# Patient Record
Sex: MALE | Race: WHITE | NOT HISPANIC OR LATINO | Employment: FULL TIME | URBAN - METROPOLITAN AREA
[De-identification: names, ages, dates, MRNs, and addresses within clinical notes are randomized per-mention and may not be internally consistent; named-entity substitution may affect disease eponyms.]

---

## 2017-01-03 ENCOUNTER — HOSPITAL ENCOUNTER (OUTPATIENT)
Dept: WOUND CARE | Facility: HOSPITAL | Age: 42
Discharge: HOME | End: 2017-01-03
Attending: PODIATRIST | Admitting: PODIATRIST

## 2017-01-10 ENCOUNTER — HOSPITAL ENCOUNTER (OUTPATIENT)
Dept: WOUND CARE | Facility: HOSPITAL | Age: 42
Discharge: HOME | End: 2017-01-10
Attending: PODIATRIST | Admitting: PODIATRIST

## 2017-01-17 ENCOUNTER — HOSPITAL ENCOUNTER (OUTPATIENT)
Dept: WOUND CARE | Facility: HOSPITAL | Age: 42
Discharge: HOME | End: 2017-01-17
Attending: PODIATRIST | Admitting: PODIATRIST

## 2017-01-24 ENCOUNTER — HOSPITAL ENCOUNTER (OUTPATIENT)
Dept: WOUND CARE | Facility: HOSPITAL | Age: 42
Discharge: HOME | End: 2017-01-24
Attending: PODIATRIST | Admitting: PODIATRIST

## 2017-02-06 ENCOUNTER — HOSPITAL ENCOUNTER (OUTPATIENT)
Dept: LAB | Facility: HOSPITAL | Age: 42
Setting detail: SPECIMEN
Discharge: HOME | End: 2017-02-06
Attending: NURSE PRACTITIONER | Admitting: NURSE PRACTITIONER

## 2017-02-06 LAB
ALBUMIN SERPL-MCNC: 3.6 G/DL (ref 3.5–4.8)
ALBUMIN/GLOB SERPL: 1.2 {RATIO} (ref 1–1.7)
ALP SERPL-CCNC: 57 IU/L (ref 32–91)
ALT SERPL-CCNC: 18 IU/L (ref 17–63)
ANION GAP SERPL CALC-SCNC: 11.3 MMOL/L (ref 10–20)
AST SERPL-CCNC: 17 IU/L (ref 15–41)
BILIRUB SERPL-MCNC: 0.5 MG/DL (ref 0.3–1.2)
BUN SERPL-MCNC: 11 MG/DL (ref 8–20)
BUN/CREAT SERPL: 18.3 (ref 6.2–20.3)
CALCIUM SERPL-MCNC: 9.5 MG/DL (ref 8.9–10.3)
CHLORIDE SERPL-SCNC: 104 MMOL/L (ref 101–111)
CHOLEST SERPL-MCNC: 146 MG/DL
CHOLEST/HDLC SERPL: 3.8 {RATIO}
CONV CO2: 25 MMOL/L (ref 22–32)
CONV LDL CHOLESTEROL DIRECT: 93 MG/DL (ref 0–100)
CONV MICROALBUM.,U,RANDOM: 39 MG/L
CONV TOTAL PROTEIN: 6.7 G/DL (ref 6.1–7.9)
CREAT 24H UR-MCNC: 78.1 MG/DL
CREAT UR-MCNC: 0.6 MG/DL (ref 0.7–1.2)
GLOBULIN UR ELPH-MCNC: 3.1 G/DL (ref 2.5–3.8)
GLUCOSE SERPL-MCNC: 256 MG/DL (ref 65–99)
HDLC SERPL-MCNC: 38 MG/DL
LDLC/HDLC SERPL: 2.4 {RATIO}
LIPID INTERPRETATION: ABNORMAL
MICROALBUMIN/CREAT UR: 49.9 UG/MG
POTASSIUM SERPL-SCNC: 4.3 MMOL/L (ref 3.6–5.1)
SODIUM SERPL-SCNC: 136 MMOL/L (ref 136–144)
TRIGL SERPL-MCNC: 93 MG/DL
VLDLC SERPL CALC-MCNC: 15.2 MG/DL

## 2017-02-07 ENCOUNTER — HOSPITAL ENCOUNTER (OUTPATIENT)
Dept: WOUND CARE | Facility: HOSPITAL | Age: 42
Discharge: HOME | End: 2017-02-07
Attending: PODIATRIST | Admitting: PODIATRIST

## 2017-02-14 ENCOUNTER — HOSPITAL ENCOUNTER (OUTPATIENT)
Dept: WOUND CARE | Facility: HOSPITAL | Age: 42
Discharge: HOME | End: 2017-02-14
Attending: PODIATRIST | Admitting: PODIATRIST

## 2017-02-21 ENCOUNTER — HOSPITAL ENCOUNTER (OUTPATIENT)
Dept: WOUND CARE | Facility: HOSPITAL | Age: 42
Discharge: HOME | End: 2017-02-21
Attending: PODIATRIST | Admitting: PODIATRIST

## 2017-02-28 ENCOUNTER — HOSPITAL ENCOUNTER (OUTPATIENT)
Dept: WOUND CARE | Facility: HOSPITAL | Age: 42
Discharge: HOME | End: 2017-02-28
Attending: PODIATRIST | Admitting: PODIATRIST

## 2017-03-14 ENCOUNTER — HOSPITAL ENCOUNTER (OUTPATIENT)
Dept: WOUND CARE | Facility: HOSPITAL | Age: 42
Discharge: HOME | End: 2017-03-14
Attending: PODIATRIST | Admitting: PODIATRIST

## 2017-03-21 ENCOUNTER — HOSPITAL ENCOUNTER (OUTPATIENT)
Dept: WOUND CARE | Facility: HOSPITAL | Age: 42
Discharge: HOME | End: 2017-03-21
Attending: PODIATRIST | Admitting: PODIATRIST

## 2017-03-24 ENCOUNTER — HOSPITAL ENCOUNTER (OUTPATIENT)
Dept: PREADMISSION TESTING | Facility: HOSPITAL | Age: 42
Discharge: HOME | End: 2017-03-24
Attending: PODIATRIST | Admitting: PODIATRIST

## 2017-03-24 ENCOUNTER — HOSPITAL ENCOUNTER (OUTPATIENT)
Dept: LAB | Facility: HOSPITAL | Age: 42
Setting detail: SPECIMEN
Discharge: HOME | End: 2017-03-24
Attending: INTERNAL MEDICINE | Admitting: INTERNAL MEDICINE

## 2017-03-24 LAB
ALBUMIN SERPL-MCNC: 3.4 G/DL (ref 3.5–4.8)
ALBUMIN/GLOB SERPL: 0.9 {RATIO} (ref 1–1.7)
ALP SERPL-CCNC: 57 IU/L (ref 32–91)
ALT SERPL-CCNC: 28 IU/L (ref 17–63)
ANION GAP SERPL CALC-SCNC: 15.1 MMOL/L (ref 10–20)
ANION GAP SERPL CALC-SCNC: 16.2 MMOL/L (ref 10–20)
AST SERPL-CCNC: 19 IU/L (ref 15–41)
BACTERIA SPEC AEROBE CULT: NORMAL
BASOPHILS # BLD AUTO: 0.1 10*3/UL (ref 0–0.2)
BASOPHILS NFR BLD AUTO: 1 % (ref 0–2)
BILIRUB SERPL-MCNC: 0.7 MG/DL (ref 0.3–1.2)
BUN SERPL-MCNC: 13 MG/DL (ref 8–20)
BUN SERPL-MCNC: 15 MG/DL (ref 8–20)
BUN/CREAT SERPL: 18.6 (ref 6.2–20.3)
BUN/CREAT SERPL: 18.8 (ref 6.2–20.3)
CALCIUM SERPL-MCNC: 9.5 MG/DL (ref 8.9–10.3)
CALCIUM SERPL-MCNC: 9.9 MG/DL (ref 8.9–10.3)
CHLORIDE SERPL-SCNC: 101 MMOL/L (ref 101–111)
CHLORIDE SERPL-SCNC: 104 MMOL/L (ref 101–111)
CONV CO2: 24 MMOL/L (ref 22–32)
CONV CO2: 24 MMOL/L (ref 22–32)
CONV TOTAL PROTEIN: 7.4 G/DL (ref 6.1–7.9)
CREAT UR-MCNC: 0.7 MG/DL (ref 0.7–1.2)
CREAT UR-MCNC: 0.8 MG/DL (ref 0.7–1.2)
DIFFERENTIAL METHOD BLD: (no result)
EOSINOPHIL # BLD AUTO: 0.4 10*3/UL (ref 0–0.3)
EOSINOPHIL # BLD AUTO: 4 % (ref 0–3)
ERYTHROCYTE [DISTWIDTH] IN BLOOD BY AUTOMATED COUNT: 13.4 % (ref 11.5–14.5)
GLOBULIN UR ELPH-MCNC: 4 G/DL (ref 2.5–3.8)
GLUCOSE SERPL-MCNC: 166 MG/DL (ref 65–99)
GLUCOSE SERPL-MCNC: 168 MG/DL (ref 65–99)
HCT VFR BLD AUTO: 41.4 % (ref 40–54)
HGB BLD-MCNC: 14.2 G/DL (ref 14–18)
LYMPHOCYTES # BLD AUTO: 2.3 10*3/UL (ref 0.8–4.8)
LYMPHOCYTES NFR BLD AUTO: 24 % (ref 18–42)
Lab: NORMAL
MCH RBC QN AUTO: 30.8 PG (ref 26–32)
MCHC RBC AUTO-ENTMCNC: 34.3 G/DL (ref 32–36)
MCV RBC AUTO: 89.9 FL (ref 80–94)
MICRO REPORT STATUS: NORMAL
MONOCYTES # BLD AUTO: 0.5 10*3/UL (ref 0.1–1.3)
MONOCYTES NFR BLD AUTO: 5 % (ref 2–11)
NEUTROPHILS # BLD AUTO: 6.1 10*3/UL (ref 2.3–8.6)
NEUTROPHILS NFR BLD AUTO: 66 % (ref 50–75)
NRBC BLD AUTO-RTO: 0 /100{WBCS}
NRBC/RBC NFR BLD MANUAL: 0 10*3/UL
PLATELET # BLD AUTO: 249 10*3/UL (ref 150–450)
PMV BLD AUTO: 7.1 FL (ref 7.4–10.4)
POTASSIUM SERPL-SCNC: 4.1 MMOL/L (ref 3.6–5.1)
POTASSIUM SERPL-SCNC: 4.2 MMOL/L (ref 3.6–5.1)
RBC # BLD AUTO: 4.6 10*6/UL (ref 4.6–6)
SODIUM SERPL-SCNC: 136 MMOL/L (ref 136–144)
SODIUM SERPL-SCNC: 140 MMOL/L (ref 136–144)
SPECIMEN SOURCE: NORMAL
WBC # BLD AUTO: 9.3 10*3/UL (ref 4.5–11.5)

## 2017-03-28 ENCOUNTER — HOSPITAL ENCOUNTER (OUTPATIENT)
Dept: WOUND CARE | Facility: HOSPITAL | Age: 42
Discharge: HOME | End: 2017-03-28
Attending: PODIATRIST | Admitting: PODIATRIST

## 2017-03-31 ENCOUNTER — HOSPITAL ENCOUNTER (OUTPATIENT)
Dept: PREOP | Facility: HOSPITAL | Age: 42
Setting detail: HOSPITAL OUTPATIENT SURGERY
Discharge: HOME | End: 2017-03-31
Attending: PODIATRIST | Admitting: PODIATRIST

## 2017-03-31 LAB
BACTERIA SPEC AEROBE CULT: NORMAL
GLUCOSE BLD-MCNC: 190 MG/DL (ref 70–105)
GRAM STN SPEC: NORMAL
Lab: NORMAL
MICRO REPORT STATUS: NORMAL
SPECIMEN SOURCE: NORMAL

## 2017-04-11 ENCOUNTER — HOSPITAL ENCOUNTER (OUTPATIENT)
Dept: WOUND CARE | Facility: HOSPITAL | Age: 42
Discharge: HOME | End: 2017-04-11
Attending: PODIATRIST | Admitting: PODIATRIST

## 2017-04-18 ENCOUNTER — HOSPITAL ENCOUNTER (OUTPATIENT)
Dept: WOUND CARE | Facility: HOSPITAL | Age: 42
Discharge: HOME | End: 2017-04-18
Attending: PODIATRIST | Admitting: PODIATRIST

## 2017-04-28 ENCOUNTER — HOSPITAL ENCOUNTER (OUTPATIENT)
Dept: LAB | Facility: HOSPITAL | Age: 42
Discharge: HOME | End: 2017-04-28
Attending: PODIATRIST | Admitting: PODIATRIST

## 2017-04-28 LAB
ANION GAP SERPL CALC-SCNC: 14.1 MMOL/L (ref 10–20)
BACTERIA SPEC AEROBE CULT: NORMAL
BASOPHILS # BLD AUTO: 0.1 10*3/UL (ref 0–0.2)
BASOPHILS NFR BLD AUTO: 1 % (ref 0–2)
BUN SERPL-MCNC: 16 MG/DL (ref 8–20)
BUN/CREAT SERPL: 22.9 (ref 6.2–20.3)
CALCIUM SERPL-MCNC: 9.5 MG/DL (ref 8.9–10.3)
CHLORIDE SERPL-SCNC: 104 MMOL/L (ref 101–111)
CONV CO2: 24 MMOL/L (ref 22–32)
CREAT UR-MCNC: 0.7 MG/DL (ref 0.7–1.2)
DIFFERENTIAL METHOD BLD: (no result)
EOSINOPHIL # BLD AUTO: 0.4 10*3/UL (ref 0–0.3)
EOSINOPHIL # BLD AUTO: 4 % (ref 0–3)
ERYTHROCYTE [DISTWIDTH] IN BLOOD BY AUTOMATED COUNT: 12.9 % (ref 11.5–14.5)
GLUCOSE SERPL-MCNC: 170 MG/DL (ref 65–99)
HCT VFR BLD AUTO: 42.3 % (ref 40–54)
HGB BLD-MCNC: 14.6 G/DL (ref 14–18)
LYMPHOCYTES # BLD AUTO: 2.1 10*3/UL (ref 0.8–4.8)
LYMPHOCYTES NFR BLD AUTO: 22 % (ref 18–42)
Lab: NORMAL
MCH RBC QN AUTO: 31.2 PG (ref 26–32)
MCHC RBC AUTO-ENTMCNC: 34.5 G/DL (ref 32–36)
MCV RBC AUTO: 90.5 FL (ref 80–94)
MICRO REPORT STATUS: NORMAL
MONOCYTES # BLD AUTO: 0.5 10*3/UL (ref 0.1–1.3)
MONOCYTES NFR BLD AUTO: 6 % (ref 2–11)
NEUTROPHILS # BLD AUTO: 6.3 10*3/UL (ref 2.3–8.6)
NEUTROPHILS NFR BLD AUTO: 67 % (ref 50–75)
NRBC BLD AUTO-RTO: 0 /100{WBCS}
NRBC/RBC NFR BLD MANUAL: 0 10*3/UL
PLATELET # BLD AUTO: 222 10*3/UL (ref 150–450)
PMV BLD AUTO: 7.2 FL (ref 7.4–10.4)
POTASSIUM SERPL-SCNC: 4.1 MMOL/L (ref 3.6–5.1)
RBC # BLD AUTO: 4.68 10*6/UL (ref 4.6–6)
SODIUM SERPL-SCNC: 138 MMOL/L (ref 136–144)
SPECIMEN SOURCE: NORMAL
WBC # BLD AUTO: 9.3 10*3/UL (ref 4.5–11.5)

## 2017-05-02 ENCOUNTER — HOSPITAL ENCOUNTER (OUTPATIENT)
Dept: WOUND CARE | Facility: HOSPITAL | Age: 42
Discharge: HOME | End: 2017-05-02
Attending: PODIATRIST | Admitting: PODIATRIST

## 2017-05-05 ENCOUNTER — HOSPITAL ENCOUNTER (OUTPATIENT)
Dept: PREOP | Facility: HOSPITAL | Age: 42
Setting detail: HOSPITAL OUTPATIENT SURGERY
Discharge: HOME | End: 2017-05-05
Attending: PODIATRIST | Admitting: PODIATRIST

## 2017-05-05 LAB — GLUCOSE BLD-MCNC: 190 MG/DL (ref 70–105)

## 2017-05-16 ENCOUNTER — HOSPITAL ENCOUNTER (OUTPATIENT)
Dept: WOUND CARE | Facility: HOSPITAL | Age: 42
Discharge: HOME | End: 2017-05-16
Attending: PODIATRIST | Admitting: PODIATRIST

## 2017-05-23 ENCOUNTER — HOSPITAL ENCOUNTER (OUTPATIENT)
Dept: WOUND CARE | Facility: HOSPITAL | Age: 42
Discharge: HOME | End: 2017-05-23
Attending: PODIATRIST | Admitting: PODIATRIST

## 2017-05-30 ENCOUNTER — HOSPITAL ENCOUNTER (OUTPATIENT)
Dept: WOUND CARE | Facility: HOSPITAL | Age: 42
Discharge: HOME | End: 2017-05-30
Attending: PODIATRIST | Admitting: PODIATRIST

## 2017-06-13 ENCOUNTER — HOSPITAL ENCOUNTER (OUTPATIENT)
Dept: WOUND CARE | Facility: HOSPITAL | Age: 42
Discharge: HOME | End: 2017-06-13
Attending: PODIATRIST | Admitting: PODIATRIST

## 2017-06-20 ENCOUNTER — HOSPITAL ENCOUNTER (OUTPATIENT)
Dept: WOUND CARE | Facility: HOSPITAL | Age: 42
Discharge: HOME | End: 2017-06-20
Attending: PODIATRIST | Admitting: PODIATRIST

## 2017-06-27 ENCOUNTER — HOSPITAL ENCOUNTER (OUTPATIENT)
Dept: WOUND CARE | Facility: HOSPITAL | Age: 42
Discharge: HOME | End: 2017-06-27
Attending: PODIATRIST | Admitting: PODIATRIST

## 2017-07-11 ENCOUNTER — HOSPITAL ENCOUNTER (OUTPATIENT)
Dept: WOUND CARE | Facility: HOSPITAL | Age: 42
Discharge: HOME | End: 2017-07-11
Attending: PODIATRIST | Admitting: PODIATRIST

## 2017-07-13 ENCOUNTER — HOSPITAL ENCOUNTER (OUTPATIENT)
Dept: LAB | Facility: HOSPITAL | Age: 42
Setting detail: SPECIMEN
Discharge: HOME | End: 2017-07-13
Attending: NURSE PRACTITIONER | Admitting: NURSE PRACTITIONER

## 2017-07-13 LAB
ALBUMIN SERPL-MCNC: 3.8 G/DL (ref 3.5–4.8)
ALBUMIN/GLOB SERPL: 1.1 {RATIO} (ref 1–1.7)
ALP SERPL-CCNC: 52 IU/L (ref 32–91)
ALT SERPL-CCNC: 25 IU/L (ref 17–63)
ANION GAP SERPL CALC-SCNC: 13.3 MMOL/L (ref 10–20)
AST SERPL-CCNC: 18 IU/L (ref 15–41)
BILIRUB SERPL-MCNC: 0.5 MG/DL (ref 0.3–1.2)
BUN SERPL-MCNC: 12 MG/DL (ref 8–20)
BUN/CREAT SERPL: 20 (ref 6.2–20.3)
CALCIUM SERPL-MCNC: 9.8 MG/DL (ref 8.9–10.3)
CHLORIDE SERPL-SCNC: 103 MMOL/L (ref 101–111)
CHOLEST SERPL-MCNC: 203 MG/DL
CHOLEST/HDLC SERPL: 4.9 {RATIO}
CONV CO2: 26 MMOL/L (ref 22–32)
CONV LDL CHOLESTEROL DIRECT: 145 MG/DL (ref 0–100)
CONV MICROALBUM.,U,RANDOM: 9 MG/L
CONV TOTAL PROTEIN: 7.2 G/DL (ref 6.1–7.9)
CREAT 24H UR-MCNC: 79.1 MG/DL
CREAT UR-MCNC: 0.6 MG/DL (ref 0.7–1.2)
GLOBULIN UR ELPH-MCNC: 3.4 G/DL (ref 2.5–3.8)
GLUCOSE SERPL-MCNC: 195 MG/DL (ref 65–99)
HDLC SERPL-MCNC: 42 MG/DL
LDLC/HDLC SERPL: 3.5 {RATIO}
LIPID INTERPRETATION: ABNORMAL
MICROALBUMIN/CREAT UR: 11.4 UG/MG
POTASSIUM SERPL-SCNC: 4.3 MMOL/L (ref 3.6–5.1)
SODIUM SERPL-SCNC: 138 MMOL/L (ref 136–144)
TRIGL SERPL-MCNC: 144 MG/DL
VLDLC SERPL CALC-MCNC: 16.5 MG/DL

## 2017-07-18 ENCOUNTER — HOSPITAL ENCOUNTER (OUTPATIENT)
Dept: WOUND CARE | Facility: HOSPITAL | Age: 42
Discharge: HOME | End: 2017-07-18
Attending: PODIATRIST | Admitting: PODIATRIST

## 2017-07-25 ENCOUNTER — HOSPITAL ENCOUNTER (OUTPATIENT)
Dept: WOUND CARE | Facility: HOSPITAL | Age: 42
Discharge: HOME | End: 2017-07-25
Attending: PODIATRIST | Admitting: PODIATRIST

## 2017-08-01 ENCOUNTER — HOSPITAL ENCOUNTER (OUTPATIENT)
Dept: WOUND CARE | Facility: HOSPITAL | Age: 42
Discharge: HOME | End: 2017-08-01
Attending: PODIATRIST | Admitting: PODIATRIST

## 2017-08-08 ENCOUNTER — HOSPITAL ENCOUNTER (OUTPATIENT)
Dept: WOUND CARE | Facility: HOSPITAL | Age: 42
Discharge: HOME | End: 2017-08-08
Attending: PODIATRIST | Admitting: PODIATRIST

## 2017-08-29 ENCOUNTER — HOSPITAL ENCOUNTER (OUTPATIENT)
Dept: WOUND CARE | Facility: HOSPITAL | Age: 42
Discharge: HOME | End: 2017-08-29
Attending: PODIATRIST | Admitting: PODIATRIST

## 2017-10-09 ENCOUNTER — HOSPITAL ENCOUNTER (OUTPATIENT)
Dept: LAB | Facility: HOSPITAL | Age: 42
Setting detail: SPECIMEN
Discharge: HOME | End: 2017-10-09
Attending: INTERNAL MEDICINE | Admitting: INTERNAL MEDICINE

## 2017-10-09 LAB
ALBUMIN SERPL-MCNC: 3.7 G/DL (ref 3.5–4.8)
ALBUMIN/GLOB SERPL: 1.2 {RATIO} (ref 1–1.7)
ALP SERPL-CCNC: 51 IU/L (ref 32–91)
ALT SERPL-CCNC: 25 IU/L (ref 17–63)
ANION GAP SERPL CALC-SCNC: 12.5 MMOL/L (ref 10–20)
AST SERPL-CCNC: 22 IU/L (ref 15–41)
BILIRUB SERPL-MCNC: 0.5 MG/DL (ref 0.3–1.2)
BUN SERPL-MCNC: 19 MG/DL (ref 8–20)
BUN/CREAT SERPL: 23.8 (ref 6.2–20.3)
CALCIUM SERPL-MCNC: 9.5 MG/DL (ref 8.9–10.3)
CHLORIDE SERPL-SCNC: 102 MMOL/L (ref 101–111)
CHOLEST SERPL-MCNC: 152 MG/DL
CHOLEST/HDLC SERPL: 4.2 {RATIO}
CONV CO2: 26 MMOL/L (ref 22–32)
CONV LDL CHOLESTEROL DIRECT: 99 MG/DL (ref 0–100)
CONV MICROALBUM.,U,RANDOM: 61 MG/L
CONV TOTAL PROTEIN: 6.7 G/DL (ref 6.1–7.9)
CREAT 24H UR-MCNC: 172.6 MG/DL
CREAT UR-MCNC: 0.8 MG/DL (ref 0.7–1.2)
GLOBULIN UR ELPH-MCNC: 3 G/DL (ref 2.5–3.8)
GLUCOSE SERPL-MCNC: 184 MG/DL (ref 65–99)
HDLC SERPL-MCNC: 36 MG/DL
LDLC/HDLC SERPL: 2.7 {RATIO}
LIPID INTERPRETATION: ABNORMAL
MICROALBUMIN/CREAT UR: 35.3 UG/MG
POTASSIUM SERPL-SCNC: 4.5 MMOL/L (ref 3.6–5.1)
SODIUM SERPL-SCNC: 136 MMOL/L (ref 136–144)
TRIGL SERPL-MCNC: 112 MG/DL
VLDLC SERPL CALC-MCNC: 16.5 MG/DL

## 2018-01-11 ENCOUNTER — HOSPITAL ENCOUNTER (OUTPATIENT)
Dept: LAB | Facility: HOSPITAL | Age: 43
Setting detail: SPECIMEN
Discharge: HOME | End: 2018-01-11
Attending: NURSE PRACTITIONER | Admitting: NURSE PRACTITIONER

## 2018-01-11 LAB
ALBUMIN SERPL-MCNC: 3.6 G/DL (ref 3.5–4.8)
ALBUMIN/GLOB SERPL: 1.2 {RATIO} (ref 1–1.7)
ALP SERPL-CCNC: 63 IU/L (ref 32–91)
ALT SERPL-CCNC: 23 IU/L (ref 17–63)
ANION GAP SERPL CALC-SCNC: 12.5 MMOL/L (ref 10–20)
AST SERPL-CCNC: 17 IU/L (ref 15–41)
BILIRUB SERPL-MCNC: 0.3 MG/DL (ref 0.3–1.2)
BUN SERPL-MCNC: 14 MG/DL (ref 8–20)
BUN/CREAT SERPL: 17.5 (ref 6.2–20.3)
CALCIUM SERPL-MCNC: 9.9 MG/DL (ref 8.9–10.3)
CHLORIDE SERPL-SCNC: 100 MMOL/L (ref 101–111)
CHOLEST SERPL-MCNC: 196 MG/DL
CHOLEST/HDLC SERPL: 4.7 {RATIO}
CONV CO2: 26 MMOL/L (ref 22–32)
CONV LDL CHOLESTEROL DIRECT: 136 MG/DL (ref 0–100)
CONV MICROALBUM.,U,RANDOM: 60 MG/L
CONV TOTAL PROTEIN: 6.7 G/DL (ref 6.1–7.9)
CREAT 24H UR-MCNC: 175.8 MG/DL
CREAT UR-MCNC: 0.8 MG/DL (ref 0.7–1.2)
GLOBULIN UR ELPH-MCNC: 3.1 G/DL (ref 2.5–3.8)
GLUCOSE SERPL-MCNC: 226 MG/DL (ref 65–99)
HDLC SERPL-MCNC: 41 MG/DL
LDLC/HDLC SERPL: 3.3 {RATIO}
LIPID INTERPRETATION: ABNORMAL
MICROALBUMIN/CREAT UR: 34.1 UG/MG
POTASSIUM SERPL-SCNC: 4.5 MMOL/L (ref 3.6–5.1)
SODIUM SERPL-SCNC: 134 MMOL/L (ref 136–144)
TRIGL SERPL-MCNC: 130 MG/DL
VLDLC SERPL CALC-MCNC: 18.7 MG/DL

## 2018-03-07 ENCOUNTER — HOSPITAL ENCOUNTER (OUTPATIENT)
Dept: ORTHOPEDIC SURGERY | Facility: CLINIC | Age: 43
Discharge: HOME | End: 2018-03-07
Attending: PODIATRIST | Admitting: PODIATRIST

## 2018-04-11 ENCOUNTER — HOSPITAL ENCOUNTER (OUTPATIENT)
Dept: LAB | Facility: HOSPITAL | Age: 43
Setting detail: SPECIMEN
Discharge: HOME | End: 2018-04-11
Attending: INTERNAL MEDICINE | Admitting: INTERNAL MEDICINE

## 2018-04-11 LAB
ALBUMIN SERPL-MCNC: 3.4 G/DL (ref 3.5–4.8)
ALBUMIN/GLOB SERPL: 1 {RATIO} (ref 1–1.7)
ALP SERPL-CCNC: 56 IU/L (ref 32–91)
ALT SERPL-CCNC: 18 IU/L (ref 17–63)
ANION GAP SERPL CALC-SCNC: 12 MMOL/L (ref 10–20)
AST SERPL-CCNC: 16 IU/L (ref 15–41)
BILIRUB SERPL-MCNC: 0.3 MG/DL (ref 0.3–1.2)
BUN SERPL-MCNC: 11 MG/DL (ref 8–20)
BUN/CREAT SERPL: 18.3 (ref 6.2–20.3)
CALCIUM SERPL-MCNC: 9.1 MG/DL (ref 8.9–10.3)
CHLORIDE SERPL-SCNC: 101 MMOL/L (ref 101–111)
CHOLEST SERPL-MCNC: 128 MG/DL
CHOLEST/HDLC SERPL: 3.6 {RATIO}
CONV CO2: 26 MMOL/L (ref 22–32)
CONV LDL CHOLESTEROL DIRECT: 78 MG/DL (ref 0–100)
CONV MICROALBUM.,U,RANDOM: 102 MG/L
CONV TOTAL PROTEIN: 6.7 G/DL (ref 6.1–7.9)
CREAT 24H UR-MCNC: 163.3 MG/DL
CREAT UR-MCNC: 0.6 MG/DL (ref 0.7–1.2)
GLOBULIN UR ELPH-MCNC: 3.3 G/DL (ref 2.5–3.8)
GLUCOSE SERPL-MCNC: 162 MG/DL (ref 65–99)
HDLC SERPL-MCNC: 35 MG/DL
LDLC/HDLC SERPL: 2.2 {RATIO}
LIPID INTERPRETATION: ABNORMAL
MICROALBUMIN/CREAT UR: 62.5 UG/MG
POTASSIUM SERPL-SCNC: 4 MMOL/L (ref 3.6–5.1)
SODIUM SERPL-SCNC: 135 MMOL/L (ref 136–144)
TRIGL SERPL-MCNC: 79 MG/DL
VLDLC SERPL CALC-MCNC: 15 MG/DL

## 2018-12-12 ENCOUNTER — HOSPITAL ENCOUNTER (OUTPATIENT)
Dept: ORTHOPEDIC SURGERY | Facility: CLINIC | Age: 43
Setting detail: SPECIMEN
Discharge: HOME | End: 2018-12-12
Attending: PODIATRIST | Admitting: PODIATRIST

## 2018-12-12 LAB
BACTERIA SPEC AEROBE CULT: NORMAL
BACTERIA SPEC AEROBE CULT: NORMAL
GRAM STN SPEC: NORMAL
Lab: NORMAL
Lab: NORMAL
MICRO REPORT STATUS: NORMAL
MICRO REPORT STATUS: NORMAL
SPECIMEN SOURCE: NORMAL
SPECIMEN SOURCE: NORMAL

## 2018-12-19 ENCOUNTER — HOSPITAL ENCOUNTER (OUTPATIENT)
Dept: MRI IMAGING | Facility: HOSPITAL | Age: 43
Discharge: HOME | End: 2018-12-19
Attending: PODIATRIST | Admitting: PODIATRIST

## 2018-12-20 LAB — CREAT BLDA-MCNC: 0.6 MG/DL (ref 0.6–1.3)

## 2019-01-03 ENCOUNTER — HOSPITAL ENCOUNTER (OUTPATIENT)
Dept: HOME HEALTH SERVICES | Facility: HOME HEALTHCARE | Age: 44
Setting detail: SPECIMEN
Discharge: HOME | End: 2019-01-03
Attending: INTERNAL MEDICINE | Admitting: INTERNAL MEDICINE

## 2019-01-03 LAB
ALBUMIN SERPL-MCNC: 2.5 G/DL (ref 3.5–4.8)
ALBUMIN/GLOB SERPL: 0.6 {RATIO} (ref 1–1.7)
ALP SERPL-CCNC: 56 IU/L (ref 32–91)
ALT SERPL-CCNC: 15 IU/L (ref 17–63)
ANION GAP SERPL CALC-SCNC: 12.7 MMOL/L (ref 10–20)
AST SERPL-CCNC: 15 IU/L (ref 15–41)
BASOPHILS # BLD AUTO: 0.1 10*3/UL (ref 0–0.2)
BASOPHILS NFR BLD AUTO: 1 % (ref 0–2)
BILIRUB SERPL-MCNC: 0.2 MG/DL (ref 0.3–1.2)
BUN SERPL-MCNC: 7 MG/DL (ref 8–20)
BUN/CREAT SERPL: 10 (ref 6.2–20.3)
CALCIUM SERPL-MCNC: 8.8 MG/DL (ref 8.9–10.3)
CHLORIDE SERPL-SCNC: 94 MMOL/L (ref 101–111)
CONV CO2: 27 MMOL/L (ref 22–32)
CONV TOTAL PROTEIN: 6.8 G/DL (ref 6.1–7.9)
CREAT UR-MCNC: 0.7 MG/DL (ref 0.7–1.2)
DIFFERENTIAL METHOD BLD: (no result)
EOSINOPHIL # BLD AUTO: 0.5 10*3/UL (ref 0–0.3)
EOSINOPHIL # BLD AUTO: 6 % (ref 0–3)
ERYTHROCYTE [DISTWIDTH] IN BLOOD BY AUTOMATED COUNT: 13 % (ref 11.5–14.5)
ERYTHROCYTE [SEDIMENTATION RATE] IN BLOOD BY WESTERGREN METHOD: 114 MM/HR (ref 0–15)
GLOBULIN UR ELPH-MCNC: 4.3 G/DL (ref 2.5–3.8)
GLUCOSE SERPL-MCNC: 150 MG/DL (ref 65–99)
HCT VFR BLD AUTO: 32.6 % (ref 40–54)
HGB BLD-MCNC: 11.4 G/DL (ref 14–18)
LYMPHOCYTES # BLD AUTO: 1.8 10*3/UL (ref 0.8–4.8)
LYMPHOCYTES NFR BLD AUTO: 21 % (ref 18–42)
MCH RBC QN AUTO: 31 PG (ref 26–32)
MCHC RBC AUTO-ENTMCNC: 35 G/DL (ref 32–36)
MCV RBC AUTO: 88.5 FL (ref 80–94)
MONOCYTES # BLD AUTO: 0.5 10*3/UL (ref 0.1–1.3)
MONOCYTES NFR BLD AUTO: 6 % (ref 2–11)
NEUTROPHILS # BLD AUTO: 5.6 10*3/UL (ref 2.3–8.6)
NEUTROPHILS NFR BLD AUTO: 66 % (ref 50–75)
NRBC BLD AUTO-RTO: 0 /100{WBCS}
NRBC/RBC NFR BLD MANUAL: 0 10*3/UL
PLATELET # BLD AUTO: 345 10*3/UL (ref 150–450)
PMV BLD AUTO: 5.8 FL (ref 7.4–10.4)
POTASSIUM SERPL-SCNC: 3.7 MMOL/L (ref 3.6–5.1)
RBC # BLD AUTO: 3.69 10*6/UL (ref 4.6–6)
SODIUM SERPL-SCNC: 130 MMOL/L (ref 136–144)
WBC # BLD AUTO: 8.5 10*3/UL (ref 4.5–11.5)

## 2019-01-09 ENCOUNTER — HOSPITAL ENCOUNTER (OUTPATIENT)
Dept: HOME HEALTH SERVICES | Facility: HOME HEALTHCARE | Age: 44
Setting detail: SPECIMEN
Discharge: HOME | End: 2019-01-09
Attending: INTERNAL MEDICINE | Admitting: INTERNAL MEDICINE

## 2019-01-09 LAB
ALBUMIN SERPL-MCNC: 2.9 G/DL (ref 3.5–4.8)
ALBUMIN/GLOB SERPL: 0.8 {RATIO} (ref 1–1.7)
ALP SERPL-CCNC: 60 IU/L (ref 32–91)
ALT SERPL-CCNC: 15 IU/L (ref 17–63)
ANION GAP SERPL CALC-SCNC: 14.9 MMOL/L (ref 10–20)
AST SERPL-CCNC: 18 IU/L (ref 15–41)
BASOPHILS # BLD AUTO: 0 10*3/UL (ref 0–0.2)
BASOPHILS NFR BLD AUTO: 1 % (ref 0–2)
BILIRUB SERPL-MCNC: 0.3 MG/DL (ref 0.3–1.2)
BUN SERPL-MCNC: 7 MG/DL (ref 8–20)
BUN/CREAT SERPL: 14 (ref 6.2–20.3)
CALCIUM SERPL-MCNC: 8.9 MG/DL (ref 8.9–10.3)
CHLORIDE SERPL-SCNC: 97 MMOL/L (ref 101–111)
CK SERPL-CCNC: 37 IU/L (ref 49–397)
CONV CO2: 25 MMOL/L (ref 22–32)
CONV TOTAL PROTEIN: 6.4 G/DL (ref 6.1–7.9)
CREAT UR-MCNC: 0.5 MG/DL (ref 0.7–1.2)
CRP SERPL-MCNC: 1.84 MG/DL (ref 0–0.7)
DIFFERENTIAL METHOD BLD: (no result)
EOSINOPHIL # BLD AUTO: 0.7 10*3/UL (ref 0–0.3)
EOSINOPHIL # BLD AUTO: 8 % (ref 0–3)
ERYTHROCYTE [DISTWIDTH] IN BLOOD BY AUTOMATED COUNT: 13.1 % (ref 11.5–14.5)
ERYTHROCYTE [SEDIMENTATION RATE] IN BLOOD BY WESTERGREN METHOD: 77 MM/HR (ref 0–15)
GLOBULIN UR ELPH-MCNC: 3.5 G/DL (ref 2.5–3.8)
GLUCOSE SERPL-MCNC: 178 MG/DL (ref 65–99)
HCT VFR BLD AUTO: 34.4 % (ref 40–54)
HGB BLD-MCNC: 11.8 G/DL (ref 14–18)
LYMPHOCYTES # BLD AUTO: 2.1 10*3/UL (ref 0.8–4.8)
LYMPHOCYTES NFR BLD AUTO: 24 % (ref 18–42)
MCH RBC QN AUTO: 30.6 PG (ref 26–32)
MCHC RBC AUTO-ENTMCNC: 34.4 G/DL (ref 32–36)
MCV RBC AUTO: 89 FL (ref 80–94)
MONOCYTES # BLD AUTO: 0.5 10*3/UL (ref 0.1–1.3)
MONOCYTES NFR BLD AUTO: 5 % (ref 2–11)
NEUTROPHILS # BLD AUTO: 5.5 10*3/UL (ref 2.3–8.6)
NEUTROPHILS NFR BLD AUTO: 62 % (ref 50–75)
NRBC BLD AUTO-RTO: 0 /100{WBCS}
NRBC/RBC NFR BLD MANUAL: 0 10*3/UL
PLATELET # BLD AUTO: 284 10*3/UL (ref 150–450)
PMV BLD AUTO: 6.2 FL (ref 7.4–10.4)
POTASSIUM SERPL-SCNC: 3.9 MMOL/L (ref 3.6–5.1)
RBC # BLD AUTO: 3.86 10*6/UL (ref 4.6–6)
SODIUM SERPL-SCNC: 133 MMOL/L (ref 136–144)
WBC # BLD AUTO: 8.8 10*3/UL (ref 4.5–11.5)

## 2019-01-16 ENCOUNTER — HOSPITAL ENCOUNTER (OUTPATIENT)
Dept: HOME HEALTH SERVICES | Facility: HOME HEALTHCARE | Age: 44
Setting detail: SPECIMEN
Discharge: HOME | End: 2019-01-16
Attending: INTERNAL MEDICINE | Admitting: INTERNAL MEDICINE

## 2019-01-16 LAB
ALBUMIN SERPL-MCNC: 3.1 G/DL (ref 3.5–4.8)
ALBUMIN/GLOB SERPL: 0.8 {RATIO} (ref 1–1.7)
ALP SERPL-CCNC: 55 IU/L (ref 32–91)
ALT SERPL-CCNC: 14 IU/L (ref 17–63)
ANION GAP SERPL CALC-SCNC: 13.2 MMOL/L (ref 10–20)
AST SERPL-CCNC: 16 IU/L (ref 15–41)
BASOPHILS # BLD AUTO: 0.1 10*3/UL (ref 0–0.2)
BASOPHILS NFR BLD AUTO: 1 % (ref 0–2)
BILIRUB SERPL-MCNC: 0.3 MG/DL (ref 0.3–1.2)
BUN SERPL-MCNC: 12 MG/DL (ref 8–20)
BUN/CREAT SERPL: 24 (ref 6.2–20.3)
CALCIUM SERPL-MCNC: 9.2 MG/DL (ref 8.9–10.3)
CHLORIDE SERPL-SCNC: 100 MMOL/L (ref 101–111)
CK SERPL-CCNC: 132 IU/L (ref 49–397)
CONV CO2: 25 MMOL/L (ref 22–32)
CONV TOTAL PROTEIN: 6.8 G/DL (ref 6.1–7.9)
CREAT UR-MCNC: 0.5 MG/DL (ref 0.7–1.2)
CRP SERPL-MCNC: 1.28 MG/DL (ref 0–0.7)
DIFFERENTIAL METHOD BLD: (no result)
EOSINOPHIL # BLD AUTO: 0.6 10*3/UL (ref 0–0.3)
EOSINOPHIL # BLD AUTO: 7 % (ref 0–3)
ERYTHROCYTE [DISTWIDTH] IN BLOOD BY AUTOMATED COUNT: 13.4 % (ref 11.5–14.5)
ERYTHROCYTE [SEDIMENTATION RATE] IN BLOOD BY WESTERGREN METHOD: 53 MM/HR (ref 0–15)
GLOBULIN UR ELPH-MCNC: 3.7 G/DL (ref 2.5–3.8)
GLUCOSE SERPL-MCNC: 174 MG/DL (ref 65–99)
HCT VFR BLD AUTO: 38.2 % (ref 40–54)
HGB BLD-MCNC: 12.6 G/DL (ref 14–18)
LYMPHOCYTES # BLD AUTO: 1.8 10*3/UL (ref 0.8–4.8)
LYMPHOCYTES NFR BLD AUTO: 21 % (ref 18–42)
MCH RBC QN AUTO: 29.3 PG (ref 26–32)
MCHC RBC AUTO-ENTMCNC: 33 G/DL (ref 32–36)
MCV RBC AUTO: 88.9 FL (ref 80–94)
MONOCYTES # BLD AUTO: 0.5 10*3/UL (ref 0.1–1.3)
MONOCYTES NFR BLD AUTO: 6 % (ref 2–11)
NEUTROPHILS # BLD AUTO: 5.5 10*3/UL (ref 2.3–8.6)
NEUTROPHILS NFR BLD AUTO: 65 % (ref 50–75)
NRBC BLD AUTO-RTO: 0 /100{WBCS}
NRBC/RBC NFR BLD MANUAL: 0 10*3/UL
PLATELET # BLD AUTO: 240 10*3/UL (ref 150–450)
PMV BLD AUTO: 6.9 FL (ref 7.4–10.4)
POTASSIUM SERPL-SCNC: 4.2 MMOL/L (ref 3.6–5.1)
RBC # BLD AUTO: 4.3 10*6/UL (ref 4.6–6)
SODIUM SERPL-SCNC: 134 MMOL/L (ref 136–144)
WBC # BLD AUTO: 8.5 10*3/UL (ref 4.5–11.5)

## 2019-01-23 ENCOUNTER — HOSPITAL ENCOUNTER (OUTPATIENT)
Dept: HOME HEALTH SERVICES | Facility: HOME HEALTHCARE | Age: 44
Setting detail: SPECIMEN
Discharge: HOME | End: 2019-01-23
Attending: INTERNAL MEDICINE | Admitting: INTERNAL MEDICINE

## 2019-01-23 LAB
ALBUMIN SERPL-MCNC: 3.1 G/DL (ref 3.5–4.8)
ALBUMIN/GLOB SERPL: 1 {RATIO} (ref 1–1.7)
ALP SERPL-CCNC: 59 IU/L (ref 32–91)
ALT SERPL-CCNC: 17 IU/L (ref 17–63)
ANION GAP SERPL CALC-SCNC: 15.6 MMOL/L (ref 10–20)
AST SERPL-CCNC: 19 IU/L (ref 15–41)
BASOPHILS # BLD AUTO: 0.1 10*3/UL (ref 0–0.2)
BASOPHILS NFR BLD AUTO: 1 % (ref 0–2)
BILIRUB SERPL-MCNC: 0.1 MG/DL (ref 0.3–1.2)
BUN SERPL-MCNC: 12 MG/DL (ref 8–20)
BUN/CREAT SERPL: 20 (ref 6.2–20.3)
CALCIUM SERPL-MCNC: 8.8 MG/DL (ref 8.9–10.3)
CHLORIDE SERPL-SCNC: 95 MMOL/L (ref 101–111)
CK SERPL-CCNC: 206 IU/L (ref 49–397)
CONV CO2: 24 MMOL/L (ref 22–32)
CONV TOTAL PROTEIN: 6.2 G/DL (ref 6.1–7.9)
CREAT UR-MCNC: 0.6 MG/DL (ref 0.7–1.2)
CRP SERPL-MCNC: 1.51 MG/DL (ref 0–0.7)
DIFFERENTIAL METHOD BLD: (no result)
EOSINOPHIL # BLD AUTO: 0.7 10*3/UL (ref 0–0.3)
EOSINOPHIL # BLD AUTO: 7 % (ref 0–3)
ERYTHROCYTE [DISTWIDTH] IN BLOOD BY AUTOMATED COUNT: 13.9 % (ref 11.5–14.5)
ERYTHROCYTE [SEDIMENTATION RATE] IN BLOOD BY WESTERGREN METHOD: 42 MM/HR (ref 0–15)
GLOBULIN UR ELPH-MCNC: 3.1 G/DL (ref 2.5–3.8)
GLUCOSE SERPL-MCNC: 198 MG/DL (ref 65–99)
HCT VFR BLD AUTO: 36.8 % (ref 40–54)
HGB BLD-MCNC: 12.3 G/DL (ref 14–18)
LYMPHOCYTES # BLD AUTO: 2.4 10*3/UL (ref 0.8–4.8)
LYMPHOCYTES NFR BLD AUTO: 23 % (ref 18–42)
MCH RBC QN AUTO: 30.2 PG (ref 26–32)
MCHC RBC AUTO-ENTMCNC: 33.4 G/DL (ref 32–36)
MCV RBC AUTO: 90.2 FL (ref 80–94)
MONOCYTES # BLD AUTO: 0.4 10*3/UL (ref 0.1–1.3)
MONOCYTES NFR BLD AUTO: 4 % (ref 2–11)
NEUTROPHILS # BLD AUTO: 6.8 10*3/UL (ref 2.3–8.6)
NEUTROPHILS NFR BLD AUTO: 65 % (ref 50–75)
NRBC BLD AUTO-RTO: 0 /100{WBCS}
NRBC/RBC NFR BLD MANUAL: 0 10*3/UL
PLATELET # BLD AUTO: 216 10*3/UL (ref 150–450)
PMV BLD AUTO: 6.8 FL (ref 7.4–10.4)
POTASSIUM SERPL-SCNC: 3.6 MMOL/L (ref 3.6–5.1)
RBC # BLD AUTO: 4.07 10*6/UL (ref 4.6–6)
SODIUM SERPL-SCNC: 131 MMOL/L (ref 136–144)
WBC # BLD AUTO: 10.4 10*3/UL (ref 4.5–11.5)

## 2019-01-31 ENCOUNTER — HOSPITAL ENCOUNTER (OUTPATIENT)
Dept: HOME HEALTH SERVICES | Facility: HOME HEALTHCARE | Age: 44
Setting detail: SPECIMEN
Discharge: HOME | End: 2019-01-31
Attending: INTERNAL MEDICINE | Admitting: INTERNAL MEDICINE

## 2019-01-31 LAB
ALBUMIN SERPL-MCNC: 3.4 G/DL (ref 3.5–4.8)
ALBUMIN/GLOB SERPL: 1.2 {RATIO} (ref 1–1.7)
ALP SERPL-CCNC: 54 IU/L (ref 32–91)
ALT SERPL-CCNC: 20 IU/L (ref 17–63)
ANION GAP SERPL CALC-SCNC: 16.9 MMOL/L (ref 10–20)
AST SERPL-CCNC: 18 IU/L (ref 15–41)
BASOPHILS # BLD AUTO: 0.1 10*3/UL (ref 0–0.2)
BASOPHILS NFR BLD AUTO: 1 % (ref 0–2)
BILIRUB SERPL-MCNC: 0.5 MG/DL (ref 0.3–1.2)
BUN SERPL-MCNC: 13 MG/DL (ref 8–20)
BUN/CREAT SERPL: 21.7 (ref 6.2–20.3)
CALCIUM SERPL-MCNC: 9.1 MG/DL (ref 8.9–10.3)
CHLORIDE SERPL-SCNC: 97 MMOL/L (ref 101–111)
CK SERPL-CCNC: 53 IU/L (ref 49–397)
CONV CO2: 21 MMOL/L (ref 22–32)
CONV TOTAL PROTEIN: 6.3 G/DL (ref 6.1–7.9)
CREAT UR-MCNC: 0.6 MG/DL (ref 0.7–1.2)
CRP SERPL-MCNC: 1.44 MG/DL (ref 0–0.7)
DIFFERENTIAL METHOD BLD: (no result)
EOSINOPHIL # BLD AUTO: 0.8 10*3/UL (ref 0–0.3)
EOSINOPHIL # BLD AUTO: 10 % (ref 0–3)
ERYTHROCYTE [DISTWIDTH] IN BLOOD BY AUTOMATED COUNT: 14.6 % (ref 11.5–14.5)
ERYTHROCYTE [SEDIMENTATION RATE] IN BLOOD BY WESTERGREN METHOD: 29 MM/HR (ref 0–15)
GLOBULIN UR ELPH-MCNC: 2.9 G/DL (ref 2.5–3.8)
GLUCOSE SERPL-MCNC: 141 MG/DL (ref 65–99)
HCT VFR BLD AUTO: 38.1 % (ref 40–54)
HGB BLD-MCNC: 12.7 G/DL (ref 14–18)
LYMPHOCYTES # BLD AUTO: 1.9 10*3/UL (ref 0.8–4.8)
LYMPHOCYTES NFR BLD AUTO: 23 % (ref 18–42)
MCH RBC QN AUTO: 29.8 PG (ref 26–32)
MCHC RBC AUTO-ENTMCNC: 33.4 G/DL (ref 32–36)
MCV RBC AUTO: 89.3 FL (ref 80–94)
MONOCYTES # BLD AUTO: 0.5 10*3/UL (ref 0.1–1.3)
MONOCYTES NFR BLD AUTO: 7 % (ref 2–11)
NEUTROPHILS # BLD AUTO: 4.9 10*3/UL (ref 2.3–8.6)
NEUTROPHILS NFR BLD AUTO: 59 % (ref 50–75)
NRBC BLD AUTO-RTO: 0 /100{WBCS}
NRBC/RBC NFR BLD MANUAL: 0 10*3/UL
PLATELET # BLD AUTO: 222 10*3/UL (ref 150–450)
PMV BLD AUTO: 7.2 FL (ref 7.4–10.4)
POTASSIUM SERPL-SCNC: 3.9 MMOL/L (ref 3.6–5.1)
RBC # BLD AUTO: 4.27 10*6/UL (ref 4.6–6)
SODIUM SERPL-SCNC: 131 MMOL/L (ref 136–144)
WBC # BLD AUTO: 8.2 10*3/UL (ref 4.5–11.5)

## 2019-02-06 ENCOUNTER — HOSPITAL ENCOUNTER (OUTPATIENT)
Dept: HOME HEALTH SERVICES | Facility: HOME HEALTHCARE | Age: 44
Setting detail: SPECIMEN
Discharge: HOME | End: 2019-02-06
Attending: INTERNAL MEDICINE | Admitting: INTERNAL MEDICINE

## 2019-02-06 ENCOUNTER — HOSPITAL ENCOUNTER (OUTPATIENT)
Dept: OTHER | Facility: HOSPITAL | Age: 44
Discharge: HOME | End: 2019-02-06
Attending: PODIATRIST | Admitting: PODIATRIST

## 2019-02-07 LAB
ALBUMIN SERPL-MCNC: 3.7 G/DL (ref 3.5–4.8)
ALBUMIN/GLOB SERPL: 1 {RATIO} (ref 1–1.7)
ALP SERPL-CCNC: 52 IU/L (ref 32–91)
ALT SERPL-CCNC: 22 IU/L (ref 17–63)
ANION GAP SERPL CALC-SCNC: 16.4 MMOL/L (ref 10–20)
AST SERPL-CCNC: 22 IU/L (ref 15–41)
BASOPHILS # BLD AUTO: 0.1 10*3/UL (ref 0–0.2)
BASOPHILS NFR BLD AUTO: 1 % (ref 0–2)
BILIRUB SERPL-MCNC: 0.4 MG/DL (ref 0.3–1.2)
BUN SERPL-MCNC: 14 MG/DL (ref 8–20)
BUN/CREAT SERPL: 20 (ref 6.2–20.3)
CALCIUM SERPL-MCNC: 9.6 MG/DL (ref 8.9–10.3)
CHLORIDE SERPL-SCNC: 99 MMOL/L (ref 101–111)
CK SERPL-CCNC: 65 IU/L (ref 49–397)
CONV CO2: 22 MMOL/L (ref 22–32)
CONV TOTAL PROTEIN: 7.4 G/DL (ref 6.1–7.9)
CREAT UR-MCNC: 0.7 MG/DL (ref 0.7–1.2)
CRP SERPL-MCNC: 2.63 MG/DL (ref 0–0.7)
DIFFERENTIAL METHOD BLD: (no result)
EOSINOPHIL # BLD AUTO: 0.8 10*3/UL (ref 0–0.3)
EOSINOPHIL # BLD AUTO: 7 % (ref 0–3)
ERYTHROCYTE [DISTWIDTH] IN BLOOD BY AUTOMATED COUNT: 15.2 % (ref 11.5–14.5)
ERYTHROCYTE [SEDIMENTATION RATE] IN BLOOD BY WESTERGREN METHOD: 39 MM/HR (ref 0–15)
GLOBULIN UR ELPH-MCNC: 3.7 G/DL (ref 2.5–3.8)
GLUCOSE SERPL-MCNC: 175 MG/DL (ref 65–99)
HCT VFR BLD AUTO: 39.2 % (ref 40–54)
HGB BLD-MCNC: 13 G/DL (ref 14–18)
LYMPHOCYTES # BLD AUTO: 2.1 10*3/UL (ref 0.8–4.8)
LYMPHOCYTES NFR BLD AUTO: 18 % (ref 18–42)
MCH RBC QN AUTO: 30.2 PG (ref 26–32)
MCHC RBC AUTO-ENTMCNC: 33.1 G/DL (ref 32–36)
MCV RBC AUTO: 91.2 FL (ref 80–94)
MONOCYTES # BLD AUTO: 0.7 10*3/UL (ref 0.1–1.3)
MONOCYTES NFR BLD AUTO: 6 % (ref 2–11)
NEUTROPHILS # BLD AUTO: 7.7 10*3/UL (ref 2.3–8.6)
NEUTROPHILS NFR BLD AUTO: 68 % (ref 50–75)
NRBC BLD AUTO-RTO: 0 /100{WBCS}
NRBC/RBC NFR BLD MANUAL: 0 10*3/UL
PLATELET # BLD AUTO: 235 10*3/UL (ref 150–450)
PMV BLD AUTO: 7.4 FL (ref 7.4–10.4)
POTASSIUM SERPL-SCNC: 4.4 MMOL/L (ref 3.6–5.1)
RBC # BLD AUTO: 4.3 10*6/UL (ref 4.6–6)
SODIUM SERPL-SCNC: 133 MMOL/L (ref 136–144)
WBC # BLD AUTO: 11.3 10*3/UL (ref 4.5–11.5)

## 2019-02-13 ENCOUNTER — HOSPITAL ENCOUNTER (OUTPATIENT)
Dept: HOME HEALTH SERVICES | Facility: HOME HEALTHCARE | Age: 44
Setting detail: SPECIMEN
Discharge: HOME | End: 2019-02-13
Attending: INTERNAL MEDICINE | Admitting: INTERNAL MEDICINE

## 2019-02-13 LAB
ALBUMIN SERPL-MCNC: 3.6 G/DL (ref 3.5–4.8)
ALBUMIN/GLOB SERPL: 1 {RATIO} (ref 1–1.7)
ALP SERPL-CCNC: 50 IU/L (ref 32–91)
ALT SERPL-CCNC: 17 IU/L (ref 17–63)
ANION GAP SERPL CALC-SCNC: 17.9 MMOL/L (ref 10–20)
AST SERPL-CCNC: 15 IU/L (ref 15–41)
BASOPHILS # BLD AUTO: 0.1 10*3/UL (ref 0–0.2)
BASOPHILS NFR BLD AUTO: 1 % (ref 0–2)
BILIRUB SERPL-MCNC: 0.5 MG/DL (ref 0.3–1.2)
BUN SERPL-MCNC: 15 MG/DL (ref 8–20)
BUN/CREAT SERPL: 21.4 (ref 6.2–20.3)
CALCIUM SERPL-MCNC: 9.3 MG/DL (ref 8.9–10.3)
CHLORIDE SERPL-SCNC: 100 MMOL/L (ref 101–111)
CK SERPL-CCNC: 50 IU/L (ref 49–397)
CONV CO2: 20 MMOL/L (ref 22–32)
CONV TOTAL PROTEIN: 7.1 G/DL (ref 6.1–7.9)
CREAT UR-MCNC: 0.7 MG/DL (ref 0.7–1.2)
CRP SERPL-MCNC: 0.99 MG/DL (ref 0–0.7)
DIFFERENTIAL METHOD BLD: (no result)
EOSINOPHIL # BLD AUTO: 0.6 10*3/UL (ref 0–0.3)
EOSINOPHIL # BLD AUTO: 7 % (ref 0–3)
ERYTHROCYTE [DISTWIDTH] IN BLOOD BY AUTOMATED COUNT: 14.5 % (ref 11.5–14.5)
ERYTHROCYTE [SEDIMENTATION RATE] IN BLOOD BY WESTERGREN METHOD: 39 MM/HR (ref 0–15)
GLOBULIN UR ELPH-MCNC: 3.5 G/DL (ref 2.5–3.8)
GLUCOSE SERPL-MCNC: 143 MG/DL (ref 65–99)
HCT VFR BLD AUTO: 39.1 % (ref 40–54)
HGB BLD-MCNC: 13.1 G/DL (ref 14–18)
LYMPHOCYTES # BLD AUTO: 2.1 10*3/UL (ref 0.8–4.8)
LYMPHOCYTES NFR BLD AUTO: 24 % (ref 18–42)
MCH RBC QN AUTO: 29.9 PG (ref 26–32)
MCHC RBC AUTO-ENTMCNC: 33.5 G/DL (ref 32–36)
MCV RBC AUTO: 89.4 FL (ref 80–94)
MONOCYTES # BLD AUTO: 0.5 10*3/UL (ref 0.1–1.3)
MONOCYTES NFR BLD AUTO: 5 % (ref 2–11)
NEUTROPHILS # BLD AUTO: 5.5 10*3/UL (ref 2.3–8.6)
NEUTROPHILS NFR BLD AUTO: 63 % (ref 50–75)
NRBC BLD AUTO-RTO: 0 /100{WBCS}
NRBC/RBC NFR BLD MANUAL: 0 10*3/UL
PLATELET # BLD AUTO: 253 10*3/UL (ref 150–450)
PMV BLD AUTO: 6.9 FL (ref 7.4–10.4)
POTASSIUM SERPL-SCNC: 3.9 MMOL/L (ref 3.6–5.1)
RBC # BLD AUTO: 4.37 10*6/UL (ref 4.6–6)
SODIUM SERPL-SCNC: 134 MMOL/L (ref 136–144)
WBC # BLD AUTO: 8.7 10*3/UL (ref 4.5–11.5)

## 2019-05-22 ENCOUNTER — CONVERSION ENCOUNTER (OUTPATIENT)
Dept: ORTHOPEDIC SURGERY | Facility: CLINIC | Age: 44
End: 2019-05-22

## 2019-06-04 VITALS
WEIGHT: 283 LBS | DIASTOLIC BLOOD PRESSURE: 106 MMHG | SYSTOLIC BLOOD PRESSURE: 165 MMHG | HEIGHT: 72 IN | BODY MASS INDEX: 38.33 KG/M2 | HEART RATE: 75 BPM

## 2019-08-20 ENCOUNTER — OFFICE VISIT (OUTPATIENT)
Dept: PODIATRY | Facility: CLINIC | Age: 44
End: 2019-08-20

## 2019-08-20 VITALS
DIASTOLIC BLOOD PRESSURE: 114 MMHG | WEIGHT: 296 LBS | SYSTOLIC BLOOD PRESSURE: 137 MMHG | BODY MASS INDEX: 40.09 KG/M2 | HEIGHT: 72 IN | HEART RATE: 74 BPM

## 2019-08-20 DIAGNOSIS — E11.42 DM TYPE 2 WITH DIABETIC PERIPHERAL NEUROPATHY (HCC): Primary | ICD-10-CM

## 2019-08-20 DIAGNOSIS — Z89.432 HISTORY OF TRANSMETATARSAL AMPUTATION OF LEFT FOOT (HCC): ICD-10-CM

## 2019-08-20 PROCEDURE — 99213 OFFICE O/P EST LOW 20 MIN: CPT | Performed by: PODIATRIST

## 2019-08-20 RX ORDER — ATORVASTATIN CALCIUM 20 MG/1
TABLET, FILM COATED ORAL
Refills: 11 | COMMUNITY
Start: 2019-05-14 | End: 2021-01-07

## 2019-08-20 RX ORDER — PEN NEEDLE, DIABETIC 32GX 5/32"
NEEDLE, DISPOSABLE MISCELLANEOUS
COMMUNITY
Start: 2019-08-19 | End: 2022-01-11 | Stop reason: SDUPTHER

## 2019-08-20 RX ORDER — LISINOPRIL 20 MG/1
TABLET ORAL
Refills: 11 | COMMUNITY
Start: 2019-05-14 | End: 2021-01-07

## 2019-08-20 RX ORDER — SITAGLIPTIN AND METFORMIN HYDROCHLORIDE 1000; 50 MG/1; MG/1
TABLET, FILM COATED ORAL
COMMUNITY
Start: 2019-08-19 | End: 2021-01-07

## 2019-08-20 RX ORDER — INSULIN DEGLUDEC INJECTION 100 U/ML
INJECTION, SOLUTION SUBCUTANEOUS
COMMUNITY
Start: 2019-08-03 | End: 2020-03-10

## 2019-08-20 NOTE — PROGRESS NOTES
"08/20/2019  Foot and Ankle Surgery - Established Patient/Follow-up  Provider: Dr. Roc Smith DPM  Location: [facility]    Subjective:  Sánchez Spaulding is a 44 y.o. male.     Chief Complaint   Patient presents with   • Annual Exam     Dm check       HPI: Patient returns for routine diabetic foot check.  He states that he has been doing quite well.  He denies any new issues with his feet.  He has been ambulating the diabetic shoes and inserts as requested.  He continues to await his new shoes and inserts.  He has not noticed any open wounds or infections.  He feels that his overall health and glycemic control continue to remain stable.    No Known Allergies    Current Outpatient Medications on File Prior to Visit   Medication Sig Dispense Refill   • atorvastatin (LIPITOR) 20 MG tablet   11   • JANUMET  MG per tablet      • lisinopril (PRINIVIL,ZESTRIL) 20 MG tablet   11   • RELION PEN NEEDLES 32G X 4 MM misc      • TRESIBA FLEXTOUCH 100 UNIT/ML solution pen-injector injection        No current facility-administered medications on file prior to visit.        Objective   BP (!) 137/114   Pulse 74   Ht 182.9 cm (72\")   Wt 134 kg (296 lb)   BMI 40.14 kg/m²     General Exam  Diabetic Foot Exam: performed  Appearance: appears stated age and healthy   Orientation: alert and oriented to person, place, and time   Affect: appropriate   Gait: unimpaired     Foot/Ankle Exam  Inspection and Palpation  Ecchymosis - Right: none Left: none  Tenderness - Right: none   Left: none   Swelling - Right: none   Left: none    Arch - Right: pes planus (Mild rocker-bottom deformity) Left: normal  Hammertoes - Left: absent  Claw Toes - Left: absent  Mallet Toes - Left: absent  Hallux Valgus - Left: no  Hallux Limitus - Left: no  Skin - Right: skin intact  Left: skin intact     Vascular  Dorsalis Pedis - Right: 3+ Left: 3+  Posterior Tibial - Right: 3+ Left: 3+  Skin Temperature -  Right: warm  Left: warm    CFT - " Right: < 3 seconds Left: < 3 seconds    Neurologic  Saphenous Nerve Sensation  - Right: diminished Left: diminished  Tibial Nerve Sensation - Right: diminished Left: diminished  Superficial Peroneal Nerve Sensation - Right: diminished Left: diminished  Deep Peroneal Nerve Sensation - Right: diminished Left: diminished  Sural Nerve Sensation - Right: diminished Left: diminished  Protective Sensation using [place]-Chante Monofilament - Right: 2 Left: 2  Achilles Reflex - Right: 2+ Left: 2+    Muscle Strength  Dorsiflexion - Right: 5 Left: 5  Plantar Flexion - Right: 5 Left: 5  Eversion - Right: 5 Left: 5  Inversion - Right: 5 Left: 5  Great Toe Extension - Right: 5 Left: 5  Great Toe Flexion - Right: 5 Left: 5    Right Foot/Ankle Comments  No open wounds or signs of infection.  No progressive deformity or instability  Left Foot/Ankle Comments  S/p transmetatarsal amputation -stable        Assessment/Plan   Sánchez was seen today for annual exam.    Diagnoses and all orders for this visit:    DM type 2 with diabetic peripheral neuropathy (CMS/HCC)    History of transmetatarsal amputation of left foot (CMS/HCC)      She appears to be doing quite well at this time.  No open wounds or signs of infection to the feet.  I have asked that he start wearing the new diabetic shoes and inserts when he receives them.  He continues to remain at moderate risk for pedal complications.  We did review diet, exercise, and proper glycemic control.Explained importance of diabetic foot care, daily foot checks, and glycemic control. Patient should check both feet on a daily basis, monitor and control blood sugars, make sure that both feet and in between toes are towel dried after baths or showers. Avoid barefoot walking at all times. Check shoes before putting them on.   Patient was given information on proper foot care. Call the office at the first signs of a wound or with signs of infection.  I would like to see him in 6 months for  reevaluation.    No orders of the defined types were placed in this encounter.         Note is dictated utilizing voice recognition software. Unfortunately this leads to occasional typographical errors. I apologize in advance if the situation occurs. If questions occur please do not hesitate to call our office.

## 2019-08-20 NOTE — PATIENT INSTRUCTIONS
Diabetes Mellitus and Foot Care  Foot care is an important part of your health, especially when you have diabetes. Diabetes may cause you to have problems because of poor blood flow (circulation) to your feet and legs, which can cause your skin to:  · Become thinner and drier.  · Break more easily.  · Heal more slowly.  · Peel and crack.  You may also have nerve damage (neuropathy) in your legs and feet, causing decreased feeling in them. This means that you may not notice minor injuries to your feet that could lead to more serious problems. Noticing and addressing any potential problems early is the best way to prevent future foot problems.  How to care for your feet  Foot hygiene  · Wash your feet daily with warm water and mild soap. Do not use hot water. Then, pat your feet and the areas between your toes until they are completely dry. Do not soak your feet as this can dry your skin.  · Trim your toenails straight across. Do not dig under them or around the cuticle. File the edges of your nails with an emery board or nail file.  · Apply a moisturizing lotion or petroleum jelly to the skin on your feet and to dry, brittle toenails. Use lotion that does not contain alcohol and is unscented. Do not apply lotion between your toes.  Shoes and socks  · Wear clean socks or stockings every day. Make sure they are not too tight. Do not wear knee-high stockings since they may decrease blood flow to your legs.  · Wear shoes that fit properly and have enough cushioning. Always look in your shoes before you put them on to be sure there are no objects inside.  · To break in new shoes, wear them for just a few hours a day. This prevents injuries on your feet.  Wounds, scrapes, corns, and calluses  · Check your feet daily for blisters, cuts, bruises, sores, and redness. If you cannot see the bottom of your feet, use a mirror or ask someone for help.  · Do not cut corns or calluses or try to remove them with medicine.  · If you  find a minor scrape, cut, or break in the skin on your feet, keep it and the skin around it clean and dry. You may clean these areas with mild soap and water. Do not clean the area with peroxide, alcohol, or iodine.  · If you have a wound, scrape, corn, or callus on your foot, look at it several times a day to make sure it is healing and not infected. Check for:  ? Redness, swelling, or pain.  ? Fluid or blood.  ? Warmth.  ? Pus or a bad smell.  General instructions  · Do not cross your legs. This may decrease blood flow to your feet.  · Do not use heating pads or hot water bottles on your feet. They may burn your skin. If you have lost feeling in your feet or legs, you may not know this is happening until it is too late.  · Protect your feet from hot and cold by wearing shoes, such as at the beach or on hot pavement.  · Schedule a complete foot exam at least once a year (annually) or more often if you have foot problems. If you have foot problems, report any cuts, sores, or bruises to your health care provider immediately.  Contact a health care provider if:  · You have a medical condition that increases your risk of infection and you have any cuts, sores, or bruises on your feet.  · You have an injury that is not healing.  · You have redness on your legs or feet.  · You feel burning or tingling in your legs or feet.  · You have pain or cramps in your legs and feet.  · Your legs or feet are numb.  · Your feet always feel cold.  · You have pain around a toenail.  Get help right away if:  · You have a wound, scrape, corn, or callus on your foot and:  ? You have pain, swelling, or redness that gets worse.  ? You have fluid or blood coming from the wound, scrape, corn, or callus.  ? Your wound, scrape, corn, or callus feels warm to the touch.  ? You have pus or a bad smell coming from the wound, scrape, corn, or callus.  ? You have a fever.  ? You have a red line going up your leg.  Summary  · Check your feet every day  for cuts, sores, red spots, swelling, and blisters.  · Moisturize feet and legs daily.  · Wear shoes that fit properly and have enough cushioning.  · If you have foot problems, report any cuts, sores, or bruises to your health care provider immediately.  · Schedule a complete foot exam at least once a year (annually) or more often if you have foot problems.  This information is not intended to replace advice given to you by your health care provider. Make sure you discuss any questions you have with your health care provider.  Document Released: 12/15/2001 Document Revised: 01/30/2019 Document Reviewed: 01/19/2018  [employer] Interactive Patient Education © 2019 [employer]

## 2020-03-10 ENCOUNTER — OFFICE VISIT (OUTPATIENT)
Dept: PODIATRY | Facility: CLINIC | Age: 45
End: 2020-03-10

## 2020-03-10 VITALS
SYSTOLIC BLOOD PRESSURE: 174 MMHG | HEIGHT: 72 IN | HEART RATE: 87 BPM | DIASTOLIC BLOOD PRESSURE: 111 MMHG | WEIGHT: 298 LBS | BODY MASS INDEX: 40.36 KG/M2

## 2020-03-10 DIAGNOSIS — E11.42 DM TYPE 2 WITH DIABETIC PERIPHERAL NEUROPATHY (HCC): ICD-10-CM

## 2020-03-10 DIAGNOSIS — L97.521 CHRONIC ULCER OF LEFT FOOT LIMITED TO BREAKDOWN OF SKIN (HCC): Primary | ICD-10-CM

## 2020-03-10 DIAGNOSIS — Z89.432 HISTORY OF TRANSMETATARSAL AMPUTATION OF LEFT FOOT (HCC): ICD-10-CM

## 2020-03-10 PROCEDURE — 97760 ORTHOTIC MGMT&TRAING 1ST ENC: CPT | Performed by: PODIATRIST

## 2020-03-10 PROCEDURE — 99213 OFFICE O/P EST LOW 20 MIN: CPT | Performed by: PODIATRIST

## 2020-03-10 RX ORDER — INSULIN GLARGINE 100 [IU]/ML
INJECTION, SOLUTION SUBCUTANEOUS
COMMUNITY
Start: 2020-02-07 | End: 2022-01-11 | Stop reason: SDUPTHER

## 2020-03-10 NOTE — PROGRESS NOTES
"03/10/2020  Foot and Ankle Surgery - Established Patient/Follow-up  Provider: Dr. Roc Smith DPM  Location: [facility]    Subjective:  Sánchez Spaulding is a 44 y.o. male.     Chief Complaint   Patient presents with   • Annual Exam     dm check       HPI: Patient returns with new issue involving his left foot.  He has noticed recent blister to the plantar lateral aspect of the left foot.  He states that over the last few months he does not feel that the diabetic shoe and insert fits appropriately to the left foot.  He thinks that the foot has been rubbing with increased activity.  He has noticed mild clear drainage and denies any jerzy signs of infection.  Denies fever, chills, nausea, vomiting.  He has not had any other issues with his feet.  He continues to feel that his glycemic control and overall health are relatively stable    No Known Allergies    Current Outpatient Medications on File Prior to Visit   Medication Sig Dispense Refill   • atorvastatin (LIPITOR) 20 MG tablet   11   • JANUMET  MG per tablet      • LANTUS SOLOSTAR 100 UNIT/ML injection pen INJECT 7 UNITS SUBCUTANEOUSLY AT BEDTIME INCREASE BY 2 UNITS EVERY WEEK FOR FASTING BLOOD SUGAR OVER 140     • lisinopril (PRINIVIL,ZESTRIL) 20 MG tablet   11   • RELION PEN NEEDLES 32G X 4 MM misc      • [DISCONTINUED] TRESIBA FLEXTOUCH 100 UNIT/ML solution pen-injector injection        No current facility-administered medications on file prior to visit.        Objective   BP (!) 174/111   Pulse 87   Ht 182.9 cm (72\")   Wt 135 kg (298 lb)   BMI 40.42 kg/m²     General Exam  Diabetic Foot Exam: performed  Appearance: appears stated age and healthy   Orientation: alert and oriented to person, place, and time   Affect: appropriate   Gait: unimpaired      Foot/Ankle Exam  Inspection and Palpation  Ecchymosis - Right: none Left: none  Tenderness - Right: none   Left: none   Swelling - Right: none   Left: none    Arch - Right: pes planus " (Mild rocker-bottom deformity) Left: normal  Hammertoes - Left: absent  Claw Toes - Left: absent  Mallet Toes - Left: absent  Hallux Valgus - Left: no  Hallux Limitus - Left: no  Skin - Right: skin intact  Left:  Bulla formation noted to the plantar lateral aspect of the left foot with no periwound erythema, edema, or drainage.  No fluctuance or other concerning features.  Blister measures approximately 2 cm in diameter     Vascular  Dorsalis Pedis - Right: 3+ Left: 3+  Posterior Tibial - Right: 3+ Left: 3+  Skin Temperature -  Right: warm  Left: warm    CFT - Right: < 3 seconds Left: < 3 seconds     Neurologic  Saphenous Nerve Sensation  - Right: diminished Left: diminished  Tibial Nerve Sensation - Right: diminished Left: diminished  Superficial Peroneal Nerve Sensation - Right: diminished Left: diminished  Deep Peroneal Nerve Sensation - Right: diminished Left: diminished  Sural Nerve Sensation - Right: diminished Left: diminished  Protective Sensation using [place]-Chante Monofilament - Right: 2 Left: 2  Achilles Reflex - Right: 2+ Left: 2+     Muscle Strength  Dorsiflexion - Right: 5 Left: 5  Plantar Flexion - Right: 5 Left: 5  Eversion - Right: 5 Left: 5  Inversion - Right: 5 Left: 5  Great Toe Extension - Right: 5 Left: 5  Great Toe Flexion - Right: 5 Left: 5     Right Foot/Ankle Comments  No open wounds or signs of infection.  No progressive deformity or instability  Left Foot/Ankle Comments  S/p transmetatarsal amputation -stable       Assessment/Plan   Sánchez was seen today for annual exam.    Diagnoses and all orders for this visit:    Chronic ulcer of left foot limited to breakdown of skin (CMS/[facility])    History of transmetatarsal amputation of left foot (CMS/[facility])    DM type 2 with diabetic peripheral neuropathy (CMS/[facility])      Patient returns with new issue involving the left foot.  He feels that a blister has been present for approximately 1 week.  He has tried to decrease his activity and denies any  local or constitutional signs of infection.  On evaluation, the blister has been decompressed with no jerzy open wound or tunneling.  I do not feel that there is any profound signs of infection at this time.  I have asked that he paint the area with Betadine on a daily basis.  I would like him to offload with a cam boot.  The boot was dispensed and greater than 15 minutes was spent reviewing the proper use and effects.  I have asked that he decrease his overall activity and continue to monitor the area closely.  He is to call with any signs of infection.  I would like to see him in 2 weeks for reevaluation.    No orders of the defined types were placed in this encounter.         Note is dictated utilizing voice recognition software. Unfortunately this leads to occasional typographical errors. I apologize in advance if the situation occurs. If questions occur please do not hesitate to call our office.

## 2020-03-25 ENCOUNTER — OFFICE VISIT (OUTPATIENT)
Dept: PODIATRY | Facility: CLINIC | Age: 45
End: 2020-03-25

## 2020-03-25 VITALS
BODY MASS INDEX: 40.36 KG/M2 | WEIGHT: 298 LBS | DIASTOLIC BLOOD PRESSURE: 110 MMHG | HEART RATE: 86 BPM | SYSTOLIC BLOOD PRESSURE: 181 MMHG | HEIGHT: 72 IN | TEMPERATURE: 97.7 F

## 2020-03-25 DIAGNOSIS — E11.42 DM TYPE 2 WITH DIABETIC PERIPHERAL NEUROPATHY (HCC): ICD-10-CM

## 2020-03-25 DIAGNOSIS — L97.521 CHRONIC ULCER OF LEFT FOOT LIMITED TO BREAKDOWN OF SKIN (HCC): Primary | ICD-10-CM

## 2020-03-25 DIAGNOSIS — Z89.432 HISTORY OF TRANSMETATARSAL AMPUTATION OF LEFT FOOT (HCC): ICD-10-CM

## 2020-03-25 PROCEDURE — 99213 OFFICE O/P EST LOW 20 MIN: CPT | Performed by: PODIATRIST

## 2020-04-09 ENCOUNTER — OFFICE VISIT (OUTPATIENT)
Dept: PODIATRY | Facility: CLINIC | Age: 45
End: 2020-04-09

## 2020-04-09 VITALS
WEIGHT: 298 LBS | SYSTOLIC BLOOD PRESSURE: 178 MMHG | HEIGHT: 72 IN | BODY MASS INDEX: 40.36 KG/M2 | DIASTOLIC BLOOD PRESSURE: 111 MMHG | HEART RATE: 92 BPM | TEMPERATURE: 98.6 F

## 2020-04-09 DIAGNOSIS — Z89.432 HISTORY OF TRANSMETATARSAL AMPUTATION OF LEFT FOOT (HCC): Primary | ICD-10-CM

## 2020-04-09 DIAGNOSIS — E11.42 DM TYPE 2 WITH DIABETIC PERIPHERAL NEUROPATHY (HCC): ICD-10-CM

## 2020-04-09 PROCEDURE — 99213 OFFICE O/P EST LOW 20 MIN: CPT | Performed by: PODIATRIST

## 2020-04-09 NOTE — PROGRESS NOTES
"04/09/2020  Foot and Ankle Surgery - Established Patient/Follow-up  Provider: Dr. Roc Smith DPM  Location: [facility]    Subjective:  Sánchez Spaulding is a 44 y.o. male.     Chief Complaint   Patient presents with   • Left Foot - Follow-up, Wound Check       HPI: Patient returns for follow-up on his left foot.  He states that he is doing significantly better.  He feels that the wound is now healed.  He has remained in the cam boot and is eager to return to baseline activity.    No Known Allergies    Current Outpatient Medications on File Prior to Visit   Medication Sig Dispense Refill   • atorvastatin (LIPITOR) 20 MG tablet   11   • JANUMET  MG per tablet      • LANTUS SOLOSTAR 100 UNIT/ML injection pen INJECT 7 UNITS SUBCUTANEOUSLY AT BEDTIME INCREASE BY 2 UNITS EVERY WEEK FOR FASTING BLOOD SUGAR OVER 140     • lisinopril (PRINIVIL,ZESTRIL) 20 MG tablet   11   • RELION PEN NEEDLES 32G X 4 MM misc        No current facility-administered medications on file prior to visit.        Objective   BP (!) 178/111   Pulse 92   Temp 98.6 °F (37 °C) (Oral)   Ht 182.9 cm (72\")   Wt 135 kg (298 lb)   BMI 40.42 kg/m²     General Exam  Diabetic Foot Exam: performed  Appearance: appears stated age and healthy   Orientation: alert and oriented to person, place, and time   Affect: appropriate   Gait: unimpaired      Foot/Ankle Exam  Inspection and Palpation  Ecchymosis - Right: none Left: none  Tenderness - Right: none   Left: none   Swelling - Right: none   Left: none    Arch - Right: pes planus (Mild rocker-bottom deformity) Left: normal  Hammertoes - Left: absent  Claw Toes - Left: absent  Mallet Toes - Left: absent  Hallux Valgus - Left: no  Hallux Limitus - Left: no  Skin - Right: skin intact  Left:    Ulcer is healed at this time.  Mild skin slough.  No open wounds or signs of infection     Vascular  Dorsalis Pedis - Right: 3+ Left: 3+  Posterior Tibial - Right: 3+ Left: 3+  Skin Temperature - "  Right: warm  Left: warm    CFT - Right: < 3 seconds Left: < 3 seconds     Neurologic  Saphenous Nerve Sensation  - Right: diminished Left: diminished  Tibial Nerve Sensation - Right: diminished Left: diminished  Superficial Peroneal Nerve Sensation - Right: diminished Left: diminished  Deep Peroneal Nerve Sensation - Right: diminished Left: diminished  Sural Nerve Sensation - Right: diminished Left: diminished  Protective Sensation using [place]-Chante Monofilament - Right: 2 Left: 2  Achilles Reflex - Right: 2+ Left: 2+     Muscle Strength  Dorsiflexion - Right: 5 Left: 5  Plantar Flexion - Right: 5 Left: 5  Eversion - Right: 5 Left: 5  Inversion - Right: 5 Left: 5  Great Toe Extension - Right: 5 Left: 5  Great Toe Flexion - Right: 5 Left: 5     Right Foot/Ankle Comments  No open wounds or signs of infection.  No progressive deformity or instability  Left Foot/Ankle Comments  S/p transmetatarsal amputation -stable    Assessment/Plan   Sánchez was seen today for follow-up and wound check.    Diagnoses and all orders for this visit:    History of transmetatarsal amputation of left foot (CMS/HCC)    DM type 2 with diabetic peripheral neuropathy (CMS/HCC)      Patient is doing quite well at this time.  The wound is now healed.  Patient states that he would like to continue wearing the cam boot at work but will return to his custom diabetic shoe and inserts on a day-to-day basis.  I have asked that he monitor his feet closely and call with any additional issues or concerns.  We did discuss the importance of proper glycemic control and foot checks.  I will see him in 3 months for routine follow-up.      No orders of the defined types were placed in this encounter.         Note is dictated utilizing voice recognition software. Unfortunately this leads to occasional typographical errors. I apologize in advance if the situation occurs. If questions occur please do not hesitate to call our office.

## 2020-07-09 ENCOUNTER — OFFICE VISIT (OUTPATIENT)
Dept: PODIATRY | Facility: CLINIC | Age: 45
End: 2020-07-09

## 2020-07-09 VITALS
HEIGHT: 72 IN | WEIGHT: 298 LBS | HEART RATE: 93 BPM | SYSTOLIC BLOOD PRESSURE: 183 MMHG | BODY MASS INDEX: 40.36 KG/M2 | DIASTOLIC BLOOD PRESSURE: 114 MMHG

## 2020-07-09 DIAGNOSIS — E11.42 DM TYPE 2 WITH DIABETIC PERIPHERAL NEUROPATHY (HCC): Primary | ICD-10-CM

## 2020-07-09 DIAGNOSIS — Z89.432 HISTORY OF TRANSMETATARSAL AMPUTATION OF LEFT FOOT (HCC): ICD-10-CM

## 2020-07-09 PROCEDURE — 99213 OFFICE O/P EST LOW 20 MIN: CPT | Performed by: PODIATRIST

## 2020-07-09 NOTE — PROGRESS NOTES
"07/09/2020  Foot and Ankle Surgery - Established Patient/Follow-up  Provider: Dr. Roc Smith DPM  Location: [facility]    Subjective:  Sánchez Spaulding is a 44 y.o. male.     Chief Complaint   Patient presents with   • Annual Exam     DM check       HPI: Patient returns for routine diabetic foot check.  He denies any new issues since last exam.  He feels that his overall health and glycemic control are doing well.  He has not had any open wounds or infections since last exam.  He has been wearing his diabetic shoes and inserts daily.    No Known Allergies    Current Outpatient Medications on File Prior to Visit   Medication Sig Dispense Refill   • atorvastatin (LIPITOR) 20 MG tablet   11   • JANUMET  MG per tablet      • LANTUS SOLOSTAR 100 UNIT/ML injection pen INJECT 7 UNITS SUBCUTANEOUSLY AT BEDTIME INCREASE BY 2 UNITS EVERY WEEK FOR FASTING BLOOD SUGAR OVER 140     • lisinopril (PRINIVIL,ZESTRIL) 20 MG tablet   11   • RELION PEN NEEDLES 32G X 4 MM misc        No current facility-administered medications on file prior to visit.        Objective   BP (!) 183/114   Pulse 93   Ht 182.9 cm (72\")   Wt 135 kg (298 lb)   BMI 40.42 kg/m²     General Exam  Diabetic Foot Exam: performed  Appearance: appears stated age and healthy   Orientation: alert and oriented to person, place, and time   Affect: appropriate   Gait: unimpaired      Foot/Ankle Exam  Inspection and Palpation  Ecchymosis - Right: none Left: none  Tenderness - Right: none   Left: none   Swelling - Right: none   Left: none    Arch - Right: pes planus (Mild rocker-bottom deformity) Left: normal  Hammertoes - Left: absent  Claw Toes - Left: absent  Mallet Toes - Left: absent  Hallux Valgus - Left: no  Hallux Limitus - Left: no  Skin - Right: skin intact  Left:    Ulcer is healed at this time.  Mild skin slough.  No open wounds or signs of infection     Vascular  Dorsalis Pedis - Right: 3+ Left: 3+  Posterior Tibial - " Right: 3+ Left: 3+  Skin Temperature -  Right: warm  Left: warm    CFT - Right: < 3 seconds Left: < 3 seconds     Neurologic  Saphenous Nerve Sensation  - Right: diminished Left: diminished  Tibial Nerve Sensation - Right: diminished Left: diminished  Superficial Peroneal Nerve Sensation - Right: diminished Left: diminished  Deep Peroneal Nerve Sensation - Right: diminished Left: diminished  Sural Nerve Sensation - Right: diminished Left: diminished  Protective Sensation using [place]-Chante Monofilament - Right: 2 Left: 2  Achilles Reflex - Right: 2+ Left: 2+     Muscle Strength  Dorsiflexion - Right: 5 Left: 5  Plantar Flexion - Right: 5 Left: 5  Eversion - Right: 5 Left: 5  Inversion - Right: 5 Left: 5  Great Toe Extension - Right: 5 Left: 5  Great Toe Flexion - Right: 5 Left: 5     Right Foot/Ankle Comments  No open wounds or signs of infection.  No progressive deformity or instability  Left Foot/Ankle Comments  S/p transmetatarsal amputation -stable    Assessment/Plan   Sánchez was seen today for annual exam.    Diagnoses and all orders for this visit:    DM type 2 with diabetic peripheral neuropathy (CMS/HCC)    History of transmetatarsal amputation of left foot (CMS/[facility])    Patient appears to be doing quite well at this time.  No concerning features are noted to the feet.  His physical exam is unchanged and stable.  I have asked that he continue wearing the diabetic shoes and inserts.  He understands that despite doing well he remains at high risk of pedal complications.  We did review proper diabetic foot care and the importance of tight glycemic control.  I will see him in 6 months for routine foot check.    No orders of the defined types were placed in this encounter.         Note is dictated utilizing voice recognition software. Unfortunately this leads to occasional typographical errors. I apologize in advance if the situation occurs. If questions occur please do not hesitate to call our office.

## 2021-01-07 ENCOUNTER — OFFICE VISIT (OUTPATIENT)
Dept: PODIATRY | Facility: CLINIC | Age: 46
End: 2021-01-07

## 2021-01-07 VITALS
DIASTOLIC BLOOD PRESSURE: 103 MMHG | HEIGHT: 72 IN | WEIGHT: 288 LBS | BODY MASS INDEX: 39.01 KG/M2 | SYSTOLIC BLOOD PRESSURE: 198 MMHG | HEART RATE: 96 BPM

## 2021-01-07 DIAGNOSIS — E11.42 DM TYPE 2 WITH DIABETIC PERIPHERAL NEUROPATHY (HCC): ICD-10-CM

## 2021-01-07 DIAGNOSIS — L84 PRE-ULCERATIVE CALLUSES: Primary | ICD-10-CM

## 2021-01-07 DIAGNOSIS — Z89.432 HISTORY OF TRANSMETATARSAL AMPUTATION OF LEFT FOOT (HCC): ICD-10-CM

## 2021-01-07 PROCEDURE — 11055 PARING/CUTG B9 HYPRKER LES 1: CPT | Performed by: PODIATRIST

## 2021-01-07 PROCEDURE — 99213 OFFICE O/P EST LOW 20 MIN: CPT | Performed by: PODIATRIST

## 2021-01-07 NOTE — PROGRESS NOTES
"01/07/2021  Foot and Ankle Surgery - Established Patient/Follow-up  Provider: Dr. Roc Smith DPM  Location: [facility]    Subjective:  Sánchez Spaulding is a 45 y.o. male.     Chief Complaint   Patient presents with   • Annual Exam     Dm check       HPI: Patient returns for routine diabetic foot check.  He states that he has been doing relatively well since last exam.  He does notice new callus formation involving the plantar medial aspect of the right great toe.  He did have a blister approximately 3 months to this region which had healed up and callused over.  He is unaware of any jerzy open wound or signs of infection.  He states that his blood sugars have been well controlled.  No other issues today    No Known Allergies    Current Outpatient Medications on File Prior to Visit   Medication Sig Dispense Refill   • LANTUS SOLOSTAR 100 UNIT/ML injection pen INJECT 7 UNITS SUBCUTANEOUSLY AT BEDTIME INCREASE BY 2 UNITS EVERY WEEK FOR FASTING BLOOD SUGAR OVER 140     • RELION PEN NEEDLES 32G X 4 MM misc      • [DISCONTINUED] atorvastatin (LIPITOR) 20 MG tablet   11   • [DISCONTINUED] JANUMET  MG per tablet      • [DISCONTINUED] lisinopril (PRINIVIL,ZESTRIL) 20 MG tablet   11     No current facility-administered medications on file prior to visit.        Objective   BP (!) 198/103   Pulse 96   Ht 182.9 cm (72\")   Wt 131 kg (288 lb)   BMI 39.06 kg/m²     General Exam  Diabetic Foot Exam: performed  Appearance: appears stated age and healthy   Orientation: alert and oriented to person, place, and time   Affect: appropriate   Gait: unimpaired      Foot/Ankle Exam  Inspection and Palpation  Ecchymosis - Right: none Left: none  Tenderness - Right: none   Left: none   Swelling - Right: none   Left: none    Arch - Right: pes planus (Mild rocker-bottom deformity) Left: normal  Hammertoes - Left: absent  Claw Toes - Left: absent  Mallet Toes - Left: absent  Hallux Valgus - Left: no  Hallux Limitus - " Left: no  Skin - Right: Hyperkeratotic lesion noted to the plantar medial aspect of the right hallux.  After debridement, lesion is consistent with preulcerative lesion.  No jerzy open wound or signs of infection.  Left foot remains stable      Vascular  Dorsalis Pedis - Right: 3+ Left: 3+  Posterior Tibial - Right: 3+ Left: 3+  Skin Temperature -  Right: warm  Left: warm    CFT - Right: < 3 seconds Left: < 3 seconds     Neurologic  Saphenous Nerve Sensation  - Right: diminished Left: diminished  Tibial Nerve Sensation - Right: diminished Left: diminished  Superficial Peroneal Nerve Sensation - Right: diminished Left: diminished  Deep Peroneal Nerve Sensation - Right: diminished Left: diminished  Sural Nerve Sensation - Right: diminished Left: diminished  Protective Sensation using [place] Monofilament - Right: 2 Left: 2  Achilles Reflex - Right: 2+ Left: 2+     Muscle Strength  Dorsiflexion - Right: 5 Left: 5  Plantar Flexion - Right: 5 Left: 5  Eversion - Right: 5 Left: 5  Inversion - Right: 5 Left: 5  Great Toe Extension - Right: 5 Left: 5  Great Toe Flexion - Right: 5 Left: 5     Right Foot/Ankle Comments  No open wounds or signs of infection.  No progressive deformity or instability  Left Foot/Ankle Comments  S/p transmetatarsal amputation -stable    Assessment/Plan   Diagnoses and all orders for this visit:    1. Pre-ulcerative calluses (Primary)    2. DM type 2 with diabetic peripheral neuropathy (CMS/HCC)    3. History of transmetatarsal amputation of left foot (CMS/HCC)      Patient returns for routine diabetic foot check.  He did notice a blister approximately 3 months ago which healed up.  He does have large callus formation to this area consistent with preulcerative lesion.  Debridement was performed without issue.  I have asked that he apply a diabetic foot cream twice daily.  He is to address with a pumice stone to prevent further issues.  I would like him to monitor closely and call with any  issues or concerns.  I will see him in 3 months for routine diabetic foot check.  He is to continue proper glycemic control.  I would like him to call with any issues or concerns.    Callus debridement x1    Verbal consent was obtained prior to procedure.  Callus was debrided with a 15 blade without complication.  No complicating features were noted.    No orders of the defined types were placed in this encounter.         Note is dictated utilizing voice recognition software. Unfortunately this leads to occasional typographical errors. I apologize in advance if the situation occurs. If questions occur please do not hesitate to call our office.

## 2021-04-07 ENCOUNTER — OFFICE VISIT (OUTPATIENT)
Dept: PODIATRY | Facility: CLINIC | Age: 46
End: 2021-04-07

## 2021-04-07 VITALS
BODY MASS INDEX: 40.09 KG/M2 | HEART RATE: 93 BPM | WEIGHT: 296 LBS | SYSTOLIC BLOOD PRESSURE: 174 MMHG | DIASTOLIC BLOOD PRESSURE: 100 MMHG | HEIGHT: 72 IN

## 2021-04-07 DIAGNOSIS — L84 PRE-ULCERATIVE CALLUSES: Primary | ICD-10-CM

## 2021-04-07 DIAGNOSIS — Z89.432 HISTORY OF TRANSMETATARSAL AMPUTATION OF LEFT FOOT (HCC): ICD-10-CM

## 2021-04-07 DIAGNOSIS — E11.42 DM TYPE 2 WITH DIABETIC PERIPHERAL NEUROPATHY (HCC): ICD-10-CM

## 2021-04-07 PROCEDURE — 99213 OFFICE O/P EST LOW 20 MIN: CPT | Performed by: PODIATRIST

## 2021-04-07 RX ORDER — SITAGLIPTIN AND METFORMIN HYDROCHLORIDE 1000; 50 MG/1; MG/1
1 TABLET, FILM COATED ORAL 2 TIMES DAILY
COMMUNITY
Start: 2021-03-15 | End: 2022-01-11 | Stop reason: SDUPTHER

## 2021-04-07 RX ORDER — LISINOPRIL 20 MG/1
TABLET ORAL
COMMUNITY
Start: 2021-01-13 | End: 2022-01-11 | Stop reason: SDUPTHER

## 2021-04-07 RX ORDER — ATORVASTATIN CALCIUM 20 MG/1
TABLET, FILM COATED ORAL
COMMUNITY
Start: 2021-01-13 | End: 2022-01-11

## 2021-04-07 RX ORDER — AMLODIPINE BESYLATE 5 MG/1
5 TABLET ORAL
COMMUNITY
Start: 2021-02-24 | End: 2022-01-11

## 2021-04-08 NOTE — PROGRESS NOTES
"04/07/2021  Foot and Ankle Surgery - Established Patient/Follow-up  Provider: Dr. Roc Smith DPM  Location: [facility]    Subjective:  Sánchez Spaulding is a 45 y.o. male.     Chief Complaint   Patient presents with   • Annual Exam     DM check       HPI: Patient returns for routine diabetic foot check and states that he is doing quite well since last exam.  He has had no new issues with his feet.  He has not had any profound callus formations.  He states that his A1c is mildly elevated still but he is working on weight loss.  He has not had any profound changes to his overall health.  No other issues at this time    No Known Allergies    Current Outpatient Medications on File Prior to Visit   Medication Sig Dispense Refill   • amLODIPine (NORVASC) 5 MG tablet Take 5 mg by mouth every night at bedtime.     • atorvastatin (LIPITOR) 20 MG tablet      • Janumet  MG per tablet Take 1 tablet by mouth 2 (Two) Times a Day.     • LANTUS SOLOSTAR 100 UNIT/ML injection pen INJECT 7 UNITS SUBCUTANEOUSLY AT BEDTIME INCREASE BY 2 UNITS EVERY WEEK FOR FASTING BLOOD SUGAR OVER 140     • lisinopril (PRINIVIL,ZESTRIL) 20 MG tablet      • RELION PEN NEEDLES 32G X 4 MM misc        No current facility-administered medications on file prior to visit.       Objective   /100   Pulse 93   Ht 182.9 cm (72\")   Wt 134 kg (296 lb)   BMI 40.14 kg/m²     General Exam  Diabetic Foot Exam: performed  Appearance: appears stated age and healthy   Orientation: alert and oriented to person, place, and time   Affect: appropriate   Gait: unimpaired      Foot/Ankle Exam  Inspection and Palpation  Ecchymosis - Right: none Left: none  Tenderness - Right: none   Left: none   Swelling - Right: none   Left: none    Arch - Right: pes planus (Mild rocker-bottom deformity) Left: normal  Hammertoes - Left: absent  Claw Toes - Left: absent  Mallet Toes - Left: absent  Hallux Valgus - Left: no  Hallux Limitus - Left: no  Skin - " Right: Hyperkeratotic lesion noted to the plantar medial aspect of the right hallux.  After debridement, lesion is consistent with preulcerative lesion.  No jerzy open wound or signs of infection.  Left foot remains stable      Vascular  Dorsalis Pedis - Right: 3+ Left: 3+  Posterior Tibial - Right: 3+ Left: 3+  Skin Temperature -  Right: warm  Left: warm    CFT - Right: < 3 seconds Left: < 3 seconds     Neurologic  Saphenous Nerve Sensation  - Right: diminished Left: diminished  Tibial Nerve Sensation - Right: diminished Left: diminished  Superficial Peroneal Nerve Sensation - Right: diminished Left: diminished  Deep Peroneal Nerve Sensation - Right: diminished Left: diminished  Sural Nerve Sensation - Right: diminished Left: diminished  Protective Sensation using [place]-Chante Monofilament - Right: 2 Left: 2  Achilles Reflex - Right: 2+ Left: 2+     Muscle Strength  Dorsiflexion - Right: 5 Left: 5  Plantar Flexion - Right: 5 Left: 5  Eversion - Right: 5 Left: 5  Inversion - Right: 5 Left: 5  Great Toe Extension - Right: 5 Left: 5  Great Toe Flexion - Right: 5 Left: 5     Right Foot/Ankle Comments  No open wounds or signs of infection.  No progressive deformity or instability  Left Foot/Ankle Comments  S/p transmetatarsal amputation -stable    Assessment/Plan   Diagnoses and all orders for this visit:    1. Pre-ulcerative calluses (Primary)    2. DM type 2 with diabetic peripheral neuropathy (CMS/HCC)    3. History of transmetatarsal amputation of left foot (CMS/HCC)      Patient has remained quite stable since last exam.  He has no concerning features at this time.  The feet are stable.  He understands that he remains at high risk of pedal complications.  I did review the importance of weight loss and proper diet.  I would like him to continue to work towards glycemic control and he is to see me in 6 months for routine diabetic foot check.  He knows to call with any additional issues or concerns.    No orders  of the defined types were placed in this encounter.         Note is dictated utilizing voice recognition software. Unfortunately this leads to occasional typographical errors. I apologize in advance if the situation occurs. If questions occur please do not hesitate to call our office.

## 2021-10-14 ENCOUNTER — OFFICE VISIT (OUTPATIENT)
Dept: PODIATRY | Facility: CLINIC | Age: 46
End: 2021-10-14

## 2021-10-14 VITALS
HEIGHT: 72 IN | BODY MASS INDEX: 42.53 KG/M2 | HEART RATE: 101 BPM | SYSTOLIC BLOOD PRESSURE: 160 MMHG | DIASTOLIC BLOOD PRESSURE: 96 MMHG | WEIGHT: 314 LBS

## 2021-10-14 DIAGNOSIS — L84 PRE-ULCERATIVE CALLUSES: Primary | ICD-10-CM

## 2021-10-14 DIAGNOSIS — Z89.432 HISTORY OF TRANSMETATARSAL AMPUTATION OF LEFT FOOT (HCC): ICD-10-CM

## 2021-10-14 DIAGNOSIS — E11.42 DM TYPE 2 WITH DIABETIC PERIPHERAL NEUROPATHY (HCC): ICD-10-CM

## 2021-10-14 PROCEDURE — 99213 OFFICE O/P EST LOW 20 MIN: CPT | Performed by: PODIATRIST

## 2021-10-14 NOTE — PROGRESS NOTES
"10/14/2021  Foot and Ankle Surgery - Established Patient/Follow-up  Provider: Dr. Roc Smith DPM  Location: [facility]    Subjective:  Sánchez Spaulding is a 46 y.o. male.     Chief Complaint   Patient presents with   • Right Foot - Pain   • Left Foot - Pain   • Follow-up     last pcp 9/1/2021       HPI: Patient returns for routine diabetic foot check.  He denies any new issues since last exam.  He continues to check his feet on a routine basis.  He has not had any open wounds or infections.  He does feels that his feet are doing well.  His blood sugars continue to improve and he has started an exercise program.    No Known Allergies    Current Outpatient Medications on File Prior to Visit   Medication Sig Dispense Refill   • amLODIPine (NORVASC) 5 MG tablet Take 5 mg by mouth every night at bedtime.     • atorvastatin (LIPITOR) 20 MG tablet      • Janumet  MG per tablet Take 1 tablet by mouth 2 (Two) Times a Day.     • LANTUS SOLOSTAR 100 UNIT/ML injection pen INJECT 7 UNITS SUBCUTANEOUSLY AT BEDTIME INCREASE BY 2 UNITS EVERY WEEK FOR FASTING BLOOD SUGAR OVER 140     • lisinopril (PRINIVIL,ZESTRIL) 20 MG tablet      • RELION PEN NEEDLES 32G X 4 MM misc        No current facility-administered medications on file prior to visit.       Objective   /96   Pulse 101   Ht 182.9 cm (72\")   Wt (!) 142 kg (314 lb)   BMI 42.59 kg/m²     Foot/Ankle Exam  Inspection and Palpation  Ecchymosis - Right: none Left: none  Tenderness - Right: none   Left: none   Swelling - Right: none   Left: none    Arch - Right: pes planus (Mild rocker-bottom deformity) Left: normal  Hammertoes - Left: absent  Claw Toes - Left: absent  Mallet Toes - Left: absent  Hallux Valgus - Left: no  Hallux Limitus - Left: no  Skin - Right: Hyperkeratotic lesion noted to the plantar medial aspect of the right hallux.  After debridement, lesion is consistent with preulcerative lesion.  No jerzy open wound or signs of infection. "  Left foot remains stable      Vascular  Dorsalis Pedis - Right: 3+ Left: 3+  Posterior Tibial - Right: 3+ Left: 3+  Skin Temperature -  Right: warm  Left: warm    CFT - Right: < 3 seconds Left: < 3 seconds     Neurologic  Saphenous Nerve Sensation  - Right: diminished Left: diminished  Tibial Nerve Sensation - Right: diminished Left: diminished  Superficial Peroneal Nerve Sensation - Right: diminished Left: diminished  Deep Peroneal Nerve Sensation - Right: diminished Left: diminished  Sural Nerve Sensation - Right: diminished Left: diminished  Protective Sensation using [place]-Chante Monofilament - Right: 2 Left: 2  Achilles Reflex - Right: 2+ Left: 2+     Muscle Strength  Dorsiflexion - Right: 5 Left: 5  Plantar Flexion - Right: 5 Left: 5  Eversion - Right: 5 Left: 5  Inversion - Right: 5 Left: 5  Great Toe Extension - Right: 5 Left: 5  Great Toe Flexion - Right: 5 Left: 5     Right Foot/Ankle Comments  No open wounds or signs of infection.  No progressive deformity or instability  Left Foot/Ankle Comments  S/p transmetatarsal amputation -stable    Assessment/Plan   Diagnoses and all orders for this visit:    1. Pre-ulcerative calluses (Primary)    2. DM type 2 with diabetic peripheral neuropathy (HCC)    3. History of transmetatarsal amputation of left foot (HCC)      Patient appears to be doing well.  His physical exam is relatively unchanged.  He has no open wounds or signs of infection.  He continues to have mild callus formation involving the plantar aspect of both feet.  I do continue to feel that he remains at moderate to high risk of pedal complications.  We did discuss the importance of daily foot checks and glycemic control.  I have prescribed topical urea 40% for use of the calluses.  We did review proper use and effects.  I would like him to call with any new issues or concerns and I will see him in 6 months for routine diabetic foot check.  Greater than 20 minutes spent before, during, and after  evaluation for patient care    No orders of the defined types were placed in this encounter.         Note is dictated utilizing voice recognition software. Unfortunately this leads to occasional typographical errors. I apologize in advance if the situation occurs. If questions occur please do not hesitate to call our office.

## 2022-01-11 ENCOUNTER — OFFICE VISIT (OUTPATIENT)
Dept: FAMILY MEDICINE CLINIC | Facility: CLINIC | Age: 47
End: 2022-01-11

## 2022-01-11 VITALS
BODY MASS INDEX: 42.04 KG/M2 | TEMPERATURE: 97.5 F | HEIGHT: 72 IN | SYSTOLIC BLOOD PRESSURE: 195 MMHG | HEART RATE: 99 BPM | WEIGHT: 310.4 LBS | DIASTOLIC BLOOD PRESSURE: 105 MMHG | OXYGEN SATURATION: 98 %

## 2022-01-11 DIAGNOSIS — E11.42 DIABETIC POLYNEUROPATHY ASSOCIATED WITH TYPE 2 DIABETES MELLITUS: ICD-10-CM

## 2022-01-11 DIAGNOSIS — E78.49 OTHER HYPERLIPIDEMIA: ICD-10-CM

## 2022-01-11 DIAGNOSIS — E11.42 TYPE 2 DIABETES MELLITUS WITH DIABETIC POLYNEUROPATHY, WITH LONG-TERM CURRENT USE OF INSULIN: Primary | ICD-10-CM

## 2022-01-11 DIAGNOSIS — Z11.59 NEED FOR HEPATITIS C SCREENING TEST: ICD-10-CM

## 2022-01-11 DIAGNOSIS — Z79.4 TYPE 2 DIABETES MELLITUS WITH DIABETIC POLYNEUROPATHY, WITH LONG-TERM CURRENT USE OF INSULIN: Primary | ICD-10-CM

## 2022-01-11 DIAGNOSIS — I10 WHITE COAT SYNDROME WITH DIAGNOSIS OF HYPERTENSION: ICD-10-CM

## 2022-01-11 DIAGNOSIS — I10 BENIGN HYPERTENSION: ICD-10-CM

## 2022-01-11 PROBLEM — E11.610 CHARCOT FOOT DUE TO DIABETES MELLITUS (HCC): Status: ACTIVE | Noted: 2022-01-11

## 2022-01-11 PROCEDURE — 99204 OFFICE O/P NEW MOD 45 MIN: CPT | Performed by: FAMILY MEDICINE

## 2022-01-11 RX ORDER — SITAGLIPTIN AND METFORMIN HYDROCHLORIDE 1000; 50 MG/1; MG/1
1 TABLET, FILM COATED ORAL 2 TIMES DAILY
Qty: 180 TABLET | Refills: 3 | Status: SHIPPED | OUTPATIENT
Start: 2022-01-11 | End: 2023-01-30

## 2022-01-11 RX ORDER — ATORVASTATIN CALCIUM 20 MG/1
20 TABLET, FILM COATED ORAL DAILY
Qty: 90 TABLET | Refills: 3 | Status: SHIPPED | OUTPATIENT
Start: 2022-01-11 | End: 2022-01-26

## 2022-01-11 RX ORDER — AMLODIPINE BESYLATE 10 MG/1
1 TABLET ORAL DAILY
COMMUNITY
Start: 2021-11-09 | End: 2022-01-11 | Stop reason: SDUPTHER

## 2022-01-11 RX ORDER — AMLODIPINE BESYLATE 10 MG/1
10 TABLET ORAL DAILY
Qty: 90 TABLET | Refills: 3 | Status: SHIPPED | OUTPATIENT
Start: 2022-01-11 | End: 2023-03-17

## 2022-01-11 RX ORDER — INSULIN GLARGINE 100 [IU]/ML
70 INJECTION, SOLUTION SUBCUTANEOUS NIGHTLY
Qty: 24 PEN | Refills: 3 | Status: SHIPPED | OUTPATIENT
Start: 2022-01-11 | End: 2022-01-26

## 2022-01-11 RX ORDER — LISINOPRIL 20 MG/1
20 TABLET ORAL DAILY
Qty: 90 TABLET | Refills: 3 | Status: SHIPPED | OUTPATIENT
Start: 2022-01-11 | End: 2022-01-26

## 2022-01-11 RX ORDER — ATORVASTATIN CALCIUM 20 MG/1
20 TABLET, FILM COATED ORAL DAILY
COMMUNITY
End: 2022-01-11 | Stop reason: SDUPTHER

## 2022-01-11 RX ORDER — PEN NEEDLE, DIABETIC 32GX 5/32"
NEEDLE, DISPOSABLE MISCELLANEOUS
Qty: 100 EACH | Refills: 3 | Status: SHIPPED | OUTPATIENT
Start: 2022-01-11

## 2022-01-11 NOTE — PROGRESS NOTES
Subjective   Sánchez Spaulding is a 46 y.o. male.   Chief Complaint   Patient presents with   • Diabetes       History of Present Illness   Presents to the office today as a new patient to me.  Previous PCP was at [facility] in [place].    Records available to me in epic indicate that he has seen and follows with Dr. Smith for preulcerative callus of the feet, diabetic peripheral neuropathy.  He has a history of a transmetatarsal amputation of the left foot also.    There are no other labs in [facility] since 2019.  I have no other records to look at.    Diabetes type 2- diabetes is insulin-treated.  He takes around 70 units of Lantus every night.  It sounds like he has been given some liberties to make adjustments.  He checks his blood sugar every morning and before 1 meal of the day depending on whether he is at work or not.  He has a lot of cramping in his abdomen which he feels is probably a side effect of one of his medications.  It is tolerable.  It sounds like he was on a GLP-1 inhibitor and had a lot of GI intolerance to that.  His last A1c was 8.2% and he had that done at his pharmacy.  He does not remember the last time it was checked at his previous doctor's office.  He has Charcot foot on the right.    HTN-he is out of lisinopril.  Follows blood pressure on the automated machine at work.  He tells me blood pressures typically around 140/90 or a little bit less.  He admits to being significantly anxious when he comes to a doctor's office.      Patient Active Problem List    Diagnosis Date Noted   • White coat syndrome with diagnosis of hypertension 01/11/2022   • Right Charcot foot due to diabetes mellitus (HCC) 01/11/2022     Note Last Updated: 1/11/2022     right     • Type 2 diabetes mellitus with diabetic polyneuropathy, with long-term current use of insulin ([facility])    • Benign hypertension 11/15/2016   • Diabetic polyneuropathy (HCC) 06/01/2016   • Hyperlipidemia 04/27/2016           Past Surgical  "History:   Procedure Laterality Date   • FOOT SURGERY      2016  foot surgery X5 2017   • LUNG BIOPSY  1996   • TRANS METATARSAL AMPUTATION Left 12/28/2018     Current Outpatient Medications on File Prior to Visit   Medication Sig   • [DISCONTINUED] amLODIPine (NORVASC) 10 MG tablet Take 1 tablet by mouth Daily.   • [DISCONTINUED] atorvastatin (LIPITOR) 20 MG tablet Take 20 mg by mouth Daily.   • [DISCONTINUED] Janumet  MG per tablet Take 1 tablet by mouth 2 (Two) Times a Day.   • [DISCONTINUED] LANTUS SOLOSTAR 100 UNIT/ML injection pen INJECT 7 UNITS SUBCUTANEOUSLY AT BEDTIME INCREASE BY 2 UNITS EVERY WEEK FOR FASTING BLOOD SUGAR OVER 140   • [DISCONTINUED] lisinopril (PRINIVIL,ZESTRIL) 20 MG tablet    • [DISCONTINUED] RELION PEN NEEDLES 32G X 4 MM misc    • [DISCONTINUED] amLODIPine (NORVASC) 5 MG tablet Take 5 mg by mouth every night at bedtime.   • [DISCONTINUED] atorvastatin (LIPITOR) 20 MG tablet      No current facility-administered medications on file prior to visit.     Allergies   Allergen Reactions   • Trulicity [Dulaglutide] GI Intolerance     Social History     Socioeconomic History   • Marital status:    Tobacco Use   • Smoking status: Never Smoker   • Smokeless tobacco: Never Used   Vaping Use   • Vaping Use: Never used   Substance and Sexual Activity   • Alcohol use: No   • Drug use: No   • Sexual activity: Defer     Family History   Problem Relation Age of Onset   • Kidney nephrosis Father        Review of Systems    Objective   BP (!) 195/105 (BP Location: Left arm, Patient Position: Sitting, Cuff Size: Adult)   Pulse 99   Temp 97.5 °F (36.4 °C) (Infrared)   Ht 182.9 cm (72\")   Wt (!) 141 kg (310 lb 6.4 oz)   SpO2 98%   BMI 42.10 kg/m²   Physical Exam  Constitutional:       General: He is not in acute distress.     Appearance: He is well-developed.      Comments: Wearing a face mask     HENT:      Head: Normocephalic and atraumatic.   Eyes:      Conjunctiva/sclera: Conjunctivae " normal.   Cardiovascular:      Rate and Rhythm: Normal rate.   Pulmonary:      Effort: Pulmonary effort is normal. No respiratory distress.   Musculoskeletal:         General: Normal range of motion.      Cervical back: Normal range of motion.      Comments: Wearing protective diabetic shoes   Skin:     General: Skin is warm and dry.      Findings: No rash.   Neurological:      Mental Status: He is alert and oriented to person, place, and time.   Psychiatric:         Mood and Affect: Mood normal.         Behavior: Behavior normal.         Assessment/Plan   Diagnoses and all orders for this visit:    1. Type 2 diabetes mellitus with diabetic polyneuropathy, with long-term current use of insulin (HCC) (Primary)  -     Janumet  MG per tablet; Take 1 tablet by mouth 2 (Two) Times a Day.  Dispense: 180 tablet; Refill: 3  -     Lantus SoloStar 100 UNIT/ML injection pen; Inject 70 Units under the skin into the appropriate area as directed Every Night.  Dispense: 24 pen; Refill: 3  -     ReliOn Pen Needles 32G X 4 MM misc; Use to inject insulin once per day  Dispense: 100 each; Refill: 3  -     Hemoglobin A1c; Future  -     TSH; Future  -     MicroAlbumin, Urine, Random - Urine, Clean Catch; Future    2. Benign hypertension  -     amLODIPine (NORVASC) 10 MG tablet; Take 1 tablet by mouth Daily.  Dispense: 90 tablet; Refill: 3  -     lisinopril (PRINIVIL,ZESTRIL) 20 MG tablet; Take 1 tablet by mouth Daily.  Dispense: 90 tablet; Refill: 3  -     CBC & Differential; Future  -     Comprehensive Metabolic Panel; Future    3. Other hyperlipidemia  -     atorvastatin (LIPITOR) 20 MG tablet; Take 1 tablet by mouth Daily.  Dispense: 90 tablet; Refill: 3  -     Lipid Panel; Future    4. Diabetic polyneuropathy associated with type 2 diabetes mellitus (HCC)    5. Need for hepatitis C screening test  -     Hepatitis C Antibody; Future    6. White coat syndrome with diagnosis of hypertension    Her lab is not open today.  He will  come back sometime within the next week or so and get labs as above.  I have refilled all of his medications at current doses.  This will serve as our starting point.  We will see him back here in about 2 weeks, and armed with lab results, will make adjustments to his medications.  At that point we will lay on her goals of treatment and a pathway to control.  We talked about the expected benefits of tight blood sugar control.  He, at 46 years old, already has significant peripheral complications of diabetes and good diabetic control, good blood pressure control, low-cholesterol and not smoking (which he never has) are the best ways to minimize further damage.               Call with any problems or concerns before next visit  Return in about 2 weeks (around 1/25/2022) for with labs a few day before.      Much of this report is an electronic transcription of spoken language to printed text using Dragon dictation software.  As such, the subtleties and finesse of spoken language may permit erroneous, or at times, nonsensical words or phrases to be inadvertently transcribed; thus changes may be made at a later date to rectify these errors.     Pricilla Larose MD1/11/202210:49 EST  This note has been electronically signed

## 2022-01-23 PROBLEM — E11.29 MICROALBUMINURIA DUE TO TYPE 2 DIABETES MELLITUS: Status: ACTIVE | Noted: 2022-01-23

## 2022-01-23 PROBLEM — R80.9 MICROALBUMINURIA DUE TO TYPE 2 DIABETES MELLITUS: Status: ACTIVE | Noted: 2022-01-23

## 2022-01-24 NOTE — PROGRESS NOTES
Subjective   Sánchez Spaulding is a 46 y.o. male.   Chief Complaint   Patient presents with   • Diabetes       History of Present Illness   Presents to the office today to follow-up on a recent new patient visit whereby he established care.    He has type 2 diabetes complicated by diabetic polyneuropathy and Charcot disease requiring several amputations.  We had labs done a few days ago and his A1c is 10.9%.  Renal function on CMP was normal.  Urine microalbumin was quite high at 575.6.  He reports his blood sugars in the morning are usually still between 160 and 170.  We talked about the fact that his A1c of 10.9 puts his average blood sugar still in the high 200 range.    HTN -blood pressure today is improved at 138/88.    Screened him for hypothyroidism with a TSH.  It was normal at 2.43.  Screen for hepatitis C with antibody titer-it was negative.    HLD- he is on Lipitor 20 mg/day and LDL was 130, total cholesterol was 196 and HDL was 40.    CBC was normal.  Patient Active Problem List    Diagnosis Date Noted   • Microalbuminuria due to type 2 diabetes mellitus (HCC) 01/23/2022   • White coat syndrome with diagnosis of hypertension 01/11/2022   • Right Charcot foot due to diabetes mellitus ([facility]) 01/11/2022     Note Last Updated: 1/11/2022     right     • Type 2 diabetes mellitus with diabetic polyneuropathy, with long-term current use of insulin ([facility])      Note Last Updated: 1/26/2022     DX in mid - 30's     • Severe nonproliferative diabetic retinopathy associated with type 2 diabetes mellitus ([facility]) 02/15/2021   • Benign hypertension 11/15/2016   • Diabetic polyneuropathy (HCC) 06/01/2016   • Hyperlipidemia 04/27/2016           Past Surgical History:   Procedure Laterality Date   • FOOT SURGERY      2016  foot surgery X5 2017   • LUNG BIOPSY  1996   • TRANS METATARSAL AMPUTATION Left 12/28/2018     Current Outpatient Medications on File Prior to Visit   Medication Sig   • amLODIPine (NORVASC) 10 MG tablet Take  "1 tablet by mouth Daily.   • Janumet  MG per tablet Take 1 tablet by mouth 2 (Two) Times a Day.   • ReliOn Pen Needles 32G X 4 MM misc Use to inject insulin once per day   • [DISCONTINUED] atorvastatin (LIPITOR) 20 MG tablet Take 1 tablet by mouth Daily.   • [DISCONTINUED] Lantus SoloStar 100 UNIT/ML injection pen Inject 70 Units under the skin into the appropriate area as directed Every Night.   • [DISCONTINUED] lisinopril (PRINIVIL,ZESTRIL) 20 MG tablet Take 1 tablet by mouth Daily.     No current facility-administered medications on file prior to visit.     Allergies   Allergen Reactions   • Trulicity [Dulaglutide] GI Intolerance   • Gluconic Acid Rash     Social History     Socioeconomic History   • Marital status:    Tobacco Use   • Smoking status: Never Smoker   • Smokeless tobacco: Never Used   Vaping Use   • Vaping Use: Never used   Substance and Sexual Activity   • Alcohol use: No   • Drug use: No   • Sexual activity: Defer     Family History   Problem Relation Age of Onset   • Kidney nephrosis Father        Review of Systems    Objective   /88 (BP Location: Left arm, Patient Position: Sitting, Cuff Size: Large Adult)   Pulse 90   Temp 98.6 °F (37 °C) (Oral)   Ht 182.9 cm (72.01\")   Wt (!) 144 kg (317 lb)   SpO2 98%   BMI 42.98 kg/m²   Physical Exam  Constitutional:       Appearance: He is well-developed.      Comments: Wearing a face mask     HENT:      Head: Normocephalic and atraumatic.   Eyes:      Conjunctiva/sclera: Conjunctivae normal.   Cardiovascular:      Rate and Rhythm: Normal rate.   Pulmonary:      Effort: Pulmonary effort is normal.   Musculoskeletal:         General: Normal range of motion.      Cervical back: Normal range of motion.   Skin:     General: Skin is warm and dry.      Findings: No rash.   Neurological:      Mental Status: He is alert and oriented to person, place, and time.   Psychiatric:         Behavior: Behavior normal.           Results Encounter on " 01/21/2022   Component Date Value Ref Range Status   • Hemoglobin A1C 01/21/2022 10.9* 4.8 - 5.6 % Final    Comment:          Prediabetes: 5.7 - 6.4           Diabetes: >6.4           Glycemic control for adults with diabetes: <7.0     • WBC 01/21/2022 10.6  3.4 - 10.8 x10E3/uL Final   • RBC 01/21/2022 4.60  4.14 - 5.80 x10E6/uL Final   • Hemoglobin 01/21/2022 14.2  13.0 - 17.7 g/dL Final   • Hematocrit 01/21/2022 41.8  37.5 - 51.0 % Final   • MCV 01/21/2022 91  79 - 97 fL Final   • MCH 01/21/2022 30.9  26.6 - 33.0 pg Final   • MCHC 01/21/2022 34.0  31.5 - 35.7 g/dL Final   • RDW 01/21/2022 12.4  11.6 - 15.4 % Final   • Platelets 01/21/2022 283  150 - 450 x10E3/uL Final   • Neutrophil Rel % 01/21/2022 67  Not Estab. % Final   • Lymphocyte Rel % 01/21/2022 22  Not Estab. % Final   • Monocyte Rel % 01/21/2022 6  Not Estab. % Final   • Eosinophil Rel % 01/21/2022 3  Not Estab. % Final   • Basophil Rel % 01/21/2022 1  Not Estab. % Final   • Neutrophils Absolute 01/21/2022 7.2* 1.4 - 7.0 x10E3/uL Final   • Lymphocytes Absolute 01/21/2022 2.3  0.7 - 3.1 x10E3/uL Final   • Monocytes Absolute 01/21/2022 0.6  0.1 - 0.9 x10E3/uL Final   • Eosinophils Absolute 01/21/2022 0.3  0.0 - 0.4 x10E3/uL Final   • Basophils Absolute 01/21/2022 0.1  0.0 - 0.2 x10E3/uL Final   • Immature Granulocyte Rel % 01/21/2022 1  Not Estab. % Final   • Immature Grans Absolute 01/21/2022 0.1  0.0 - 0.1 x10E3/uL Final   • Glucose 01/21/2022 178* 65 - 99 mg/dL Final   • BUN 01/21/2022 15  6 - 24 mg/dL Final   • Creatinine 01/21/2022 0.77  0.76 - 1.27 mg/dL Final   • eGFR Non  Am 01/21/2022 107  >59 mL/min/1.73 Final   • eGFR African Am 01/21/2022 124  >59 mL/min/1.73 Final    Comment: **In accordance with recommendations from the NKF-ASN Task force,**    [facility] is in the process of updating its eGFR calculation to the    2021 CKD-EPI creatinine equation that estimates kidney function    without a race variable.     • BUN/Creatinine Ratio  01/21/2022 19  9 - 20 Final   • Sodium 01/21/2022 136  134 - 144 mmol/L Final   • Potassium 01/21/2022 4.7  3.5 - 5.2 mmol/L Final   • Chloride 01/21/2022 97  96 - 106 mmol/L Final   • Total CO2 01/21/2022 27  20 - 29 mmol/L Final   • Calcium 01/21/2022 9.8  8.7 - 10.2 mg/dL Final   • Total Protein 01/21/2022 6.8  6.0 - 8.5 g/dL Final   • Albumin 01/21/2022 4.3  4.0 - 5.0 g/dL Final   • Globulin 01/21/2022 2.5  1.5 - 4.5 g/dL Final   • A/G Ratio 01/21/2022 1.7  1.2 - 2.2 Final   • Total Bilirubin 01/21/2022 0.3  0.0 - 1.2 mg/dL Final   • Alkaline Phosphatase 01/21/2022 79  44 - 121 IU/L Final   • AST (SGOT) 01/21/2022 11  0 - 40 IU/L Final   • ALT (SGPT) 01/21/2022 16  0 - 44 IU/L Final   • Total Cholesterol 01/21/2022 196  100 - 199 mg/dL Final   • Triglycerides 01/21/2022 142  0 - 149 mg/dL Final   • HDL Cholesterol 01/21/2022 40  >39 mg/dL Final   • VLDL Cholesterol Lonny 01/21/2022 26  5 - 40 mg/dL Final   • LDL Chol Calc ([facility]) 01/21/2022 130* 0 - 99 mg/dL Final   • Hep C Virus Ab 01/21/2022 <0.1  0.0 - 0.9 s/co ratio Final    Comment:                                   Negative:     < 0.8                               Indeterminate: 0.8 - 0.9                                    Positive:     > 0.9   The CDC recommends that a positive HCV antibody result   be followed up with a HCV Nucleic Acid Amplification   test ([id number]).     • TSH 01/21/2022 2.430  0.450 - 4.500 uIU/mL Final   • Microalbumin, Urine 01/21/2022 575.6  Not Estab. ug/mL Final    Comment: Results confirmed on  dilution.           Assessment/Plan   Diagnoses and all orders for this visit:    1. Type 2 diabetes mellitus with diabetic polyneuropathy, with long-term current use of insulin (HCC) (Primary)  -     Lantus SoloStar 100 UNIT/ML injection pen; Inject 80 Units under the skin into the appropriate area as directed Every Night.  Dispense: 24 pen; Refill: 3  -     Dapagliflozin Propanediol (Farxiga) 10 MG tablet; Take 10 mg by mouth Daily.   Dispense: 90 tablet; Refill: 3  -     Comprehensive Metabolic Panel; Future  -     Hemoglobin A1c; Future    2. Benign hypertension  -     lisinopril (PRINIVIL,ZESTRIL) 20 MG tablet; Take 1.5 tablets by mouth Daily.  Dispense: 90 tablet; Refill: 3    3. Other hyperlipidemia  -     atorvastatin (LIPITOR) 20 MG tablet; Take 2 tablets by mouth Daily.  Dispense: 90 tablet; Refill: 3    4. Right Charcot foot due to diabetes mellitus (HCC)    5. Microalbuminuria due to type 2 diabetes mellitus (HCC)    I reviewed all of his labs with him in detail as above.  Our goal is to get his A1c down below 7%.  We will have him increase his Lantus to 80 units every evening.  Continue checking blood sugars every morning.  Our goal is to get his morning blood sugars below 150.  We will add Farxiga 10 mg/day.  There will also be some cardioprotective benefits with this medicine as well.  We discussed that too.  Increase Lipitor to 40 mg a day to get better lipid control.  He is at high risk of vascular disease.    Renal function and electrolytes are normal, but high microalbumin level suggest progressive nephropathy as well.  Increase ACE to 30 mg/day.  Check blood pressure couple times a week.  Goal is for systolic to be below 135 and below 130 if possible.  If systolic is below 110, let me know and we will back off of lisinopril.  Follow-up here again in 3 months with labs a few days before as above.      Call with any problems or concerns before next visit       Return in about 3 months (around 4/26/2022), or with labs a few days before.      Much of this report is an electronic transcription of spoken language to printed text using Dragon dictation software.  As such, the subtleties and finesse of spoken language may permit erroneous, or at times, nonsensical words or phrases to be inadvertently transcribed; thus changes may be made at a later date to rectify these errors.     Pricilla Larose [id number]/26/[id number]:34 EST  This note  has been electronically signed

## 2022-01-26 ENCOUNTER — OFFICE VISIT (OUTPATIENT)
Dept: FAMILY MEDICINE CLINIC | Facility: CLINIC | Age: 47
End: 2022-01-26

## 2022-01-26 VITALS
TEMPERATURE: 98.6 F | BODY MASS INDEX: 42.66 KG/M2 | DIASTOLIC BLOOD PRESSURE: 88 MMHG | SYSTOLIC BLOOD PRESSURE: 138 MMHG | HEIGHT: 72 IN | HEART RATE: 90 BPM | OXYGEN SATURATION: 98 % | WEIGHT: 315 LBS

## 2022-01-26 DIAGNOSIS — Z79.4 TYPE 2 DIABETES MELLITUS WITH DIABETIC POLYNEUROPATHY, WITH LONG-TERM CURRENT USE OF INSULIN: Primary | ICD-10-CM

## 2022-01-26 DIAGNOSIS — R80.9 MICROALBUMINURIA DUE TO TYPE 2 DIABETES MELLITUS: ICD-10-CM

## 2022-01-26 DIAGNOSIS — E11.610 CHARCOT FOOT DUE TO DIABETES MELLITUS: ICD-10-CM

## 2022-01-26 DIAGNOSIS — E11.42 TYPE 2 DIABETES MELLITUS WITH DIABETIC POLYNEUROPATHY, WITH LONG-TERM CURRENT USE OF INSULIN: Primary | ICD-10-CM

## 2022-01-26 DIAGNOSIS — E11.29 MICROALBUMINURIA DUE TO TYPE 2 DIABETES MELLITUS: ICD-10-CM

## 2022-01-26 DIAGNOSIS — I10 BENIGN HYPERTENSION: ICD-10-CM

## 2022-01-26 DIAGNOSIS — E78.49 OTHER HYPERLIPIDEMIA: ICD-10-CM

## 2022-01-26 PROBLEM — E11.3499 SEVERE NONPROLIFERATIVE DIABETIC RETINOPATHY ASSOCIATED WITH TYPE 2 DIABETES MELLITUS: Status: ACTIVE | Noted: 2021-02-15

## 2022-01-26 PROCEDURE — 99214 OFFICE O/P EST MOD 30 MIN: CPT | Performed by: FAMILY MEDICINE

## 2022-01-26 RX ORDER — DAPAGLIFLOZIN 10 MG/1
10 TABLET, FILM COATED ORAL DAILY
Qty: 90 TABLET | Refills: 3 | Status: SHIPPED | OUTPATIENT
Start: 2022-01-26 | End: 2023-02-02

## 2022-01-26 RX ORDER — ATORVASTATIN CALCIUM 20 MG/1
40 TABLET, FILM COATED ORAL DAILY
Qty: 90 TABLET | Refills: 3 | Status: SHIPPED
Start: 2022-01-26 | End: 2022-05-05 | Stop reason: DRUGHIGH

## 2022-01-26 RX ORDER — LISINOPRIL 20 MG/1
30 TABLET ORAL DAILY
Qty: 90 TABLET | Refills: 3 | Status: SHIPPED | OUTPATIENT
Start: 2022-01-26 | End: 2022-03-30 | Stop reason: SDUPTHER

## 2022-01-26 RX ORDER — INSULIN GLARGINE 100 [IU]/ML
80 INJECTION, SOLUTION SUBCUTANEOUS NIGHTLY
Qty: 24 PEN | Refills: 3 | Status: SHIPPED
Start: 2022-01-26 | End: 2022-04-20

## 2022-03-30 ENCOUNTER — TELEPHONE (OUTPATIENT)
Dept: FAMILY MEDICINE CLINIC | Facility: CLINIC | Age: 47
End: 2022-03-30

## 2022-03-30 DIAGNOSIS — I10 BENIGN HYPERTENSION: ICD-10-CM

## 2022-03-30 RX ORDER — LISINOPRIL 20 MG/1
30 TABLET ORAL DAILY
Qty: 135 TABLET | Refills: 3 | Status: SHIPPED | OUTPATIENT
Start: 2022-03-30 | End: 2022-05-31 | Stop reason: DRUGHIGH

## 2022-03-30 NOTE — TELEPHONE ENCOUNTER
[facility] pharmacy calling in stating the pt was advised at his last office visit that Dr. Pleitez was increasing his lisinopril to 30mg, but the only script they have is for the previous dose of 20mg. Please send updated script to [facility] in [place].

## 2022-04-20 DIAGNOSIS — E11.42 TYPE 2 DIABETES MELLITUS WITH DIABETIC POLYNEUROPATHY, WITH LONG-TERM CURRENT USE OF INSULIN: ICD-10-CM

## 2022-04-20 DIAGNOSIS — Z79.4 TYPE 2 DIABETES MELLITUS WITH DIABETIC POLYNEUROPATHY, WITH LONG-TERM CURRENT USE OF INSULIN: ICD-10-CM

## 2022-04-20 RX ORDER — INSULIN GLARGINE 100 [IU]/ML
INJECTION, SOLUTION SUBCUTANEOUS
Qty: 15 ML | Refills: 0 | Status: SHIPPED | OUTPATIENT
Start: 2022-04-20 | End: 2022-05-16

## 2022-04-27 ENCOUNTER — OFFICE VISIT (OUTPATIENT)
Dept: PODIATRY | Facility: CLINIC | Age: 47
End: 2022-04-27

## 2022-04-27 VITALS
SYSTOLIC BLOOD PRESSURE: 158 MMHG | HEART RATE: 82 BPM | WEIGHT: 315 LBS | BODY MASS INDEX: 42.66 KG/M2 | DIASTOLIC BLOOD PRESSURE: 96 MMHG | HEIGHT: 72 IN

## 2022-04-27 DIAGNOSIS — E11.42 DM TYPE 2 WITH DIABETIC PERIPHERAL NEUROPATHY: ICD-10-CM

## 2022-04-27 DIAGNOSIS — Z89.432 HISTORY OF TRANSMETATARSAL AMPUTATION OF LEFT FOOT: ICD-10-CM

## 2022-04-27 DIAGNOSIS — L84 PRE-ULCERATIVE CALLUSES: Primary | ICD-10-CM

## 2022-04-27 PROCEDURE — 99212 OFFICE O/P EST SF 10 MIN: CPT | Performed by: PODIATRIST

## 2022-04-27 NOTE — PROGRESS NOTES
"04/27/2022  Foot and Ankle Surgery - Established Patient/Follow-up  Provider: Dr. Roc Smith DPM  Location: [facility]    Subjective:  Sánchez Spaulding is a 46 y.o. male who presents today for a 6 month follow-up.     Chief Complaint   Patient presents with   • Right Foot - Follow-up, Callouses     Dm foot check   • Left Foot - Follow-up, Callouses     Dm foot check   • Follow-up     Last pcp appt with angel cortes md 1/26/2022       HPI:  Diabetes  His blood glucose is well managed. Of note, he started seeing a new physician who is managing his diabetes well. He reports monitoring his glucose levels at home. Reports a reading as low as 103 mg/dL which he notes was the \"lowest in his life\". The patient's last hemoglobin A1c was 7.8 percent and they have plans to lower this to 6.5 percent.     Bilateral feet peripheral neuropathy  He is doing well with keeping his toenails trimmed down. Patient denies having any recent issues with his feet. The inserts in his bilateral shoes are approximately 1 year old.     Allergies   Allergen Reactions   • Trulicity [Dulaglutide] GI Intolerance   • Gluconic Acid Rash       Current Outpatient Medications on File Prior to Visit   Medication Sig Dispense Refill   • amLODIPine (NORVASC) 10 MG tablet Take 1 tablet by mouth Daily. 90 tablet 3   • atorvastatin (LIPITOR) 20 MG tablet Take 2 tablets by mouth Daily. 90 tablet 3   • Dapagliflozin Propanediol (Farxiga) 10 MG tablet Take 10 mg by mouth Daily. 90 tablet 3   • Janumet  MG per tablet Take 1 tablet by mouth 2 (Two) Times a Day. 180 tablet 3   • Lantus SoloStar 100 UNIT/ML injection pen INJECT 70 UNITS SUBCUTANEOUSLY IN THE EVENING 15 mL 0   • lisinopril (PRINIVIL,ZESTRIL) 20 MG tablet Take 1.5 tablets by mouth Daily. 135 tablet 3   • ReliOn Pen Needles 32G X 4 MM misc Use to inject insulin once per day 100 each 3     No current facility-administered medications on file prior to visit.       Objective   BP " "158/96   Pulse 82   Ht 182.9 cm (72\")   Wt (!) 144 kg (317 lb)   BMI 42.99 kg/m²     Foot/Ankle Exam  Inspection and Palpation  Ecchymosis - Right: none Left: none  Tenderness - Right: none   Left: none   Swelling - Right: none   Left: none    Arch - Right: pes planus (Mild rocker-bottom deformity) Left: normal  Hammertoes - Left: absent  Claw Toes - Left: absent  Mallet Toes - Left: absent  Hallux Valgus - Left: no  Hallux Limitus - Left: no  Skin - Right: Mild callus formation involving the plantar aspect of the right midfoot.  No underlying open wound or signs of infection     Vascular  Dorsalis Pedis - Right: 3+ Left: 3+  Posterior Tibial - Right: 3+ Left: 3+  Skin Temperature -  Right: warm  Left: warm    CFT - Right: < 3 seconds Left: < 3 seconds     Neurologic  Saphenous Nerve Sensation  - Right: diminished Left: diminished  Tibial Nerve Sensation - Right: diminished Left: diminished  Superficial Peroneal Nerve Sensation - Right: diminished Left: diminished  Deep Peroneal Nerve Sensation - Right: diminished Left: diminished  Sural Nerve Sensation - Right: diminished Left: diminished  Protective Sensation using [place]ante Monofilament - Right: 2 Left: 2  Achilles Reflex - Right: 2+ Left: 2+     Muscle Strength  Dorsiflexion - Right: 5 Left: 5  Plantar Flexion - Right: 5 Left: 5  Eversion - Right: 5 Left: 5  Inversion - Right: 5 Left: 5  Great Toe Extension - Right: 5 Left: 5  Great Toe Flexion - Right: 5 Left: 5     Right Foot/Ankle Comments  No open wounds or signs of infection.  No progressive deformity or instability  Left Foot/Ankle Comments  S/p transmetatarsal amputation -stable    Assessment/Plan   Diagnoses and all orders for this visit:    1. Pre-ulcerative calluses (Primary)    2. DM type 2 with diabetic peripheral neuropathy (HCC)    3. History of transmetatarsal amputation of left foot (HCC)      1. Pre-ulcerative calluses  He was advised to moisturize his feet on a routine basis.    1. " Diabetes  Sugar levels are stable and well controlled. Continue to follow-up with your endocrinologist regarding diabetes management. I advised him to return in the near future to reassess his bilateral feet for new inserts for his shoes.     Patient understands that he remains at moderate risk of pedal complications.    We will follow-up in 6 months.  Patient is to call with any additional issues or concerns.    No orders of the defined types were placed in this encounter.         Note is dictated utilizing voice recognition software. Unfortunately this leads to occasional typographical errors. I apologize in advance if the situation occurs. If questions occur please do not hesitate to call our office.    Transcribed from ambient dictation for RENETTA Smith DPM by Carmita Marion/Danial MORGAN Rep.  04/27/22   11:10 EDT    Patient verbalized consent to the visit recording.

## 2022-05-05 DIAGNOSIS — E78.49 OTHER HYPERLIPIDEMIA: ICD-10-CM

## 2022-05-05 RX ORDER — ATORVASTATIN CALCIUM 40 MG/1
40 TABLET, FILM COATED ORAL DAILY
Qty: 90 TABLET | Refills: 3 | Status: SHIPPED | OUTPATIENT
Start: 2022-05-05

## 2022-05-05 RX ORDER — ATORVASTATIN CALCIUM 20 MG/1
40 TABLET, FILM COATED ORAL DAILY
Qty: 90 TABLET | Refills: 3 | Status: CANCELLED
Start: 2022-05-05

## 2022-05-05 NOTE — TELEPHONE ENCOUNTER
Pharmacist states the pt was advised to double his atorvastatin to 40mg, pharmacist is needing new script for 40mg as his insurance will not allow a refill this soon for the 20mg.

## 2022-05-16 DIAGNOSIS — E11.42 TYPE 2 DIABETES MELLITUS WITH DIABETIC POLYNEUROPATHY, WITH LONG-TERM CURRENT USE OF INSULIN: ICD-10-CM

## 2022-05-16 DIAGNOSIS — Z79.4 TYPE 2 DIABETES MELLITUS WITH DIABETIC POLYNEUROPATHY, WITH LONG-TERM CURRENT USE OF INSULIN: ICD-10-CM

## 2022-05-16 RX ORDER — INSULIN GLARGINE 100 [IU]/ML
INJECTION, SOLUTION SUBCUTANEOUS
Qty: 15 ML | Refills: 0 | Status: SHIPPED | OUTPATIENT
Start: 2022-05-16 | End: 2022-06-08

## 2022-05-24 NOTE — PROGRESS NOTES
Subjective   Sánchez Spaulding is a 46 y.o. male.   Chief Complaint   Patient presents with   • Diabetes   • Hypertension   • Hyperlipidemia       History of Present Illness   Presents to the office today for ongoing follow-up of chronic medical problems for active problem list as below.    HTN- continues on lisinopril, amlodipine.  Blood pressure at home  Blood pressure in the office today is reasonable at 136/94.  No side effects of medications.    HLD- Last lipid panel was in January and his LDL was still little high at 130.  I increased his atorvastatin to 40 mg/day.  He has been tolerating that change without myalgias    Diabetes type 2- A1c was done a few days ago and it was 9%.  That was down from 10.9% in January.  CMP showed normal renal and hepatic function.  At our last visit I increased his Lantus to 80 units every evening and added Farxiga 10 mg/day.  That was in addition to Janumet 50/1000 twice daily.  He tells me that his blood sugars are doing much better.  Highest he has seen has been 127.  This morning was 122 and his lowest blood sugar was 94.  He has been watching his diet.  He is trying to lose weight.  By our scale he has lost 6 pounds, but he believes he is lost closer to 15 pounds.  He is walking more.  He and his wife are both making lots of changes to diet.        Patient Active Problem List    Diagnosis Date Noted   • Morbid obesity (HCC) 05/31/2022   • Microalbuminuria due to type 2 diabetes mellitus ([facility]) 01/23/2022   • White coat syndrome with diagnosis of hypertension 01/11/2022   • Right Charcot foot due to diabetes mellitus ([facility]) 01/11/2022     Note Last Updated: 1/11/2022     right     • Type 2 diabetes mellitus with diabetic polyneuropathy, with long-term current use of insulin ([facility])      Note Last Updated: 1/26/2022     DX in mid - 30's     • Severe nonproliferative diabetic retinopathy associated with type 2 diabetes mellitus ([facility]) 02/15/2021   • Benign hypertension 11/15/2016   •  "Diabetic polyneuropathy (HCC) 06/01/2016   • Hyperlipidemia 04/27/2016           Past Surgical History:   Procedure Laterality Date   • FOOT SURGERY      2016  foot surgery X5 2017   • LUNG BIOPSY  1996   • TRANS METATARSAL AMPUTATION Left 12/28/2018     Current Outpatient Medications on File Prior to Visit   Medication Sig   • amLODIPine (NORVASC) 10 MG tablet Take 1 tablet by mouth Daily.   • atorvastatin (Lipitor) 40 MG tablet Take 1 tablet by mouth Daily.   • Dapagliflozin Propanediol (Farxiga) 10 MG tablet Take 10 mg by mouth Daily.   • Janumet  MG per tablet Take 1 tablet by mouth 2 (Two) Times a Day.   • Lantus SoloStar 100 UNIT/ML injection pen INJECT 70 UNITS SUBCUTANEOUSLY IN THE EVENING   • lisinopril (PRINIVIL,ZESTRIL) 30 MG tablet Take 30 mg by mouth Daily.   • ReliOn Pen Needles 32G X 4 MM misc Use to inject insulin once per day   • [DISCONTINUED] lisinopril (PRINIVIL,ZESTRIL) 20 MG tablet Take 1.5 tablets by mouth Daily.     No current facility-administered medications on file prior to visit.     Allergies   Allergen Reactions   • Trulicity [Dulaglutide] GI Intolerance   • Gluconic Acid Rash     Social History     Socioeconomic History   • Marital status:      Spouse name: Kenny   • Number of children: 0   Tobacco Use   • Smoking status: Never Smoker   • Smokeless tobacco: Never Used   Vaping Use   • Vaping Use: Never used   Substance and Sexual Activity   • Alcohol use: No   • Drug use: No   • Sexual activity: Yes     Partners: Female     Birth control/protection: None     Family History   Problem Relation Age of Onset   • Kidney nephrosis Father        Review of Systems    Objective   /94 (BP Location: Right arm, Patient Position: Sitting, Cuff Size: Large Adult)   Pulse 94   Temp 97.8 °F (36.6 °C) (Infrared)   Resp 16   Ht 182.9 cm (72\")   Wt (!) 141 kg (311 lb 12.8 oz)   SpO2 97%   BMI 42.29 kg/m²   Physical Exam  Constitutional:       Appearance: He is well-developed " and overweight. He is not toxic-appearing.      Comments: Wearing a face mask     HENT:      Head: Normocephalic and atraumatic.   Eyes:      Conjunctiva/sclera: Conjunctivae normal.   Cardiovascular:      Rate and Rhythm: Normal rate.   Pulmonary:      Effort: Pulmonary effort is normal. No respiratory distress.   Musculoskeletal:         General: Normal range of motion.      Cervical back: Normal range of motion.      Right lower leg: No edema.      Left lower leg: No edema.   Skin:     General: Skin is warm and dry.      Findings: No rash.   Neurological:      Mental Status: He is alert and oriented to person, place, and time.      Gait: Gait normal.   Psychiatric:         Mood and Affect: Mood normal.         Behavior: Behavior normal.           Results Encounter on 04/26/2022   Component Date Value Ref Range Status   • Glucose 05/20/2022 102 (A) 65 - 99 mg/dL Final   • BUN 05/20/2022 15  6 - 24 mg/dL Final   • Creatinine 05/20/2022 0.93  0.76 - 1.27 mg/dL Final   • EGFR Result 05/20/2022 101  >59 mL/min/1.73 Final   • BUN/Creatinine Ratio 05/20/2022 16  9 - 20 Final   • Sodium 05/20/2022 139  134 - 144 mmol/L Final   • Potassium 05/20/2022 4.8  3.5 - 5.2 mmol/L Final   • Chloride 05/20/2022 99  96 - 106 mmol/L Final   • Total CO2 05/20/2022 24  20 - 29 mmol/L Final   • Calcium 05/20/2022 9.7  8.7 - 10.2 mg/dL Final   • Total Protein 05/20/2022 7.0  6.0 - 8.5 g/dL Final   • Albumin 05/20/2022 4.4  4.0 - 5.0 g/dL Final   • Globulin 05/20/2022 2.6  1.5 - 4.5 g/dL Final   • A/G Ratio 05/20/2022 1.7  1.2 - 2.2 Final   • Total Bilirubin 05/20/2022 0.2  0.0 - 1.2 mg/dL Final   • Alkaline Phosphatase 05/20/2022 87  44 - 121 IU/L Final   • AST (SGOT) 05/20/2022 16  0 - 40 IU/L Final   • ALT (SGPT) 05/20/2022 16  0 - 44 IU/L Final   • Hemoglobin A1C 05/20/2022 9.0 (A) 4.8 - 5.6 % Final    Comment:          Prediabetes: 5.7 - 6.4           Diabetes: >6.4           Glycemic control for adults with diabetes: <7.0            Assessment & Plan   Diagnoses and all orders for this visit:    1. Benign hypertension (Primary)    2. Type 2 diabetes mellitus with diabetic polyneuropathy, with long-term current use of insulin (HCC)  -     Comprehensive Metabolic Panel; Future  -     Hemoglobin A1c; Future    3. White coat syndrome with diagnosis of hypertension    4. Other hyperlipidemia  -     Lipid Panel; Future    He has multiple chronic medical problems.  Hypertension is greatly improved and nearly at goal.  Continue amlodipine and lisinopril at current doses.    A1c is improving, but still above goal at 9%.  He is on an SGLT-2, DPP-4, max dose metformin and Lantus in the evening.  Options include adding pioglitazone or a low-dose of glimepiride and intensifying lifestyle changes including diet and exercise..  He has an intolerance to Trulicity which would suggest the range of GLP-1 is out.  He would like to go another 3 months without making medicine adjustments and see how low he can get his A1c.  He tells me that he recently saw his first blood sugar number below 100 that he can ever recall.  Continue Lipitor 40 mg/day we will repeat a lipid panel with labs before next visit in 3 months.    We briefly discussed preventive care and he would like to wait a little while before doing colon cancer screening.    Call with any problems or concerns before next visit       Return in about 3 months (around 8/31/2022), or with labs a few days before.      Much of this report is an electronic transcription of spoken language to printed text using Dragon dictation software.  As such, the subtleties and finesse of spoken language may permit erroneous, or at times, nonsensical words or phrases to be inadvertently transcribed; thus changes may be made at a later date to rectify these errors.     Pricilla Larose MD5/31/202211:04 EDT  This note has been electronically signed

## 2022-05-31 ENCOUNTER — OFFICE VISIT (OUTPATIENT)
Dept: FAMILY MEDICINE CLINIC | Facility: CLINIC | Age: 47
End: 2022-05-31

## 2022-05-31 VITALS
WEIGHT: 311.8 LBS | SYSTOLIC BLOOD PRESSURE: 136 MMHG | OXYGEN SATURATION: 97 % | BODY MASS INDEX: 42.23 KG/M2 | DIASTOLIC BLOOD PRESSURE: 94 MMHG | RESPIRATION RATE: 16 BRPM | HEART RATE: 94 BPM | TEMPERATURE: 97.8 F | HEIGHT: 72 IN

## 2022-05-31 DIAGNOSIS — I10 WHITE COAT SYNDROME WITH DIAGNOSIS OF HYPERTENSION: ICD-10-CM

## 2022-05-31 DIAGNOSIS — E78.49 OTHER HYPERLIPIDEMIA: ICD-10-CM

## 2022-05-31 DIAGNOSIS — Z79.4 TYPE 2 DIABETES MELLITUS WITH DIABETIC POLYNEUROPATHY, WITH LONG-TERM CURRENT USE OF INSULIN: ICD-10-CM

## 2022-05-31 DIAGNOSIS — I10 BENIGN HYPERTENSION: Primary | ICD-10-CM

## 2022-05-31 DIAGNOSIS — E11.42 TYPE 2 DIABETES MELLITUS WITH DIABETIC POLYNEUROPATHY, WITH LONG-TERM CURRENT USE OF INSULIN: ICD-10-CM

## 2022-05-31 PROBLEM — E66.01 MORBID OBESITY: Status: ACTIVE | Noted: 2022-05-31

## 2022-05-31 PROCEDURE — 99214 OFFICE O/P EST MOD 30 MIN: CPT | Performed by: FAMILY MEDICINE

## 2022-05-31 RX ORDER — LISINOPRIL 30 MG/1
30 TABLET ORAL DAILY
COMMUNITY
Start: 2022-03-30

## 2022-06-08 DIAGNOSIS — E11.42 TYPE 2 DIABETES MELLITUS WITH DIABETIC POLYNEUROPATHY, WITH LONG-TERM CURRENT USE OF INSULIN: ICD-10-CM

## 2022-06-08 DIAGNOSIS — Z79.4 TYPE 2 DIABETES MELLITUS WITH DIABETIC POLYNEUROPATHY, WITH LONG-TERM CURRENT USE OF INSULIN: ICD-10-CM

## 2022-06-08 RX ORDER — INSULIN GLARGINE 100 [IU]/ML
INJECTION, SOLUTION SUBCUTANEOUS
Qty: 15 ML | Refills: 0 | Status: CANCELLED | OUTPATIENT
Start: 2022-06-08

## 2022-06-08 RX ORDER — INSULIN GLARGINE 100 [IU]/ML
INJECTION, SOLUTION SUBCUTANEOUS
Qty: 15 ML | Refills: 3 | Status: SHIPPED | OUTPATIENT
Start: 2022-06-08 | End: 2022-10-10

## 2022-08-25 LAB
ALBUMIN SERPL-MCNC: 4.3 G/DL (ref 4–5)
ALBUMIN/GLOB SERPL: 1.5 {RATIO} (ref 1.2–2.2)
ALP SERPL-CCNC: 81 IU/L (ref 44–121)
ALT SERPL-CCNC: 14 IU/L (ref 0–44)
AST SERPL-CCNC: 13 IU/L (ref 0–40)
BILIRUB SERPL-MCNC: 0.3 MG/DL (ref 0–1.2)
BUN SERPL-MCNC: 22 MG/DL (ref 6–24)
BUN/CREAT SERPL: 25 (ref 9–20)
CALCIUM SERPL-MCNC: 9.6 MG/DL (ref 8.7–10.2)
CHLORIDE SERPL-SCNC: 97 MMOL/L (ref 96–106)
CHOLEST SERPL-MCNC: 165 MG/DL (ref 100–199)
CO2 SERPL-SCNC: 23 MMOL/L (ref 20–29)
CREAT SERPL-MCNC: 0.87 MG/DL (ref 0.76–1.27)
EGFRCR-CYS SERPLBLD CKD-EPI 2021: 106 ML/MIN/1.73
GLOBULIN SER CALC-MCNC: 2.8 G/DL (ref 1.5–4.5)
GLUCOSE SERPL-MCNC: 154 MG/DL (ref 65–99)
HBA1C MFR BLD: 8.3 % (ref 4.8–5.6)
HDLC SERPL-MCNC: 41 MG/DL
LDLC SERPL CALC-MCNC: 102 MG/DL (ref 0–99)
POTASSIUM SERPL-SCNC: 4.8 MMOL/L (ref 3.5–5.2)
PROT SERPL-MCNC: 7.1 G/DL (ref 6–8.5)
SODIUM SERPL-SCNC: 136 MMOL/L (ref 134–144)
TRIGL SERPL-MCNC: 122 MG/DL (ref 0–149)
VLDLC SERPL CALC-MCNC: 22 MG/DL (ref 5–40)

## 2022-08-30 NOTE — PROGRESS NOTES
Subjective   Sánchez Spaulding is a 47 y.o. male.   Chief Complaint   Patient presents with   • Diabetes   • Hypertension   • Hyperlipidemia       History of Present Illness   Presents to the office today for ongoing follow-up of chronic medical problems for active problem list as below.  Last visit was around 3 months ago.  Last lab work was a few days ago.     HTN- continues on lisinopril, amlodipine.  Blood pressure at home usually runs in the 1 20-1 35 range over 80-90.  Blood pressure in the office today is just a little high at 142/98. No side effects of medications.        Diabetes type 2- A1c continues a slow downward trend.  A few days ago his A1c was down to 8.3% which is the lowest it has been in 3 years.  A1c was around 10.9% when I first met him in January.   CMP showed normal renal and hepatic function.  Blood sugar was 154.  Current diabetes treatment is Janumet 50/1000 twice daily, Farxiga 10 mg/day, Lantus 70 units every evening.  We did not make any adjustments to his medications 3 months ago, so the reduction from 9% to 8.3% reflects his dietary efforts.  Blood sugars in the morning usually average around 132.  The lowest number he has seen as he is followed his blood sugars has been 98.  The highest has been 147.    HLD-lipid panel showed total cholesterol of 165, LDL of 102, HDL of 41.  He is on Lipitor 40 mg/day and tolerating that without myalgias.     His only complaint today is that of generalized fatigue.  Seems to be worsening over the past year.  Stamina is down.      Patient Active Problem List    Diagnosis Date Noted   • Morbid obesity ([facility]) 05/31/2022   • Microalbuminuria due to type 2 diabetes mellitus ([facility]) 01/23/2022   • White coat syndrome with diagnosis of hypertension 01/11/2022   • Right Charcot foot due to diabetes mellitus ([facility]) 01/11/2022     Note Last Updated: 1/11/2022     right     • Type 2 diabetes mellitus with diabetic polyneuropathy, with long-term current use of insulin  "([facility])      Note Last Updated: 1/26/2022     DX in mid - 30's     • Severe nonproliferative diabetic retinopathy associated with type 2 diabetes mellitus ([facility]) 02/15/2021   • Benign hypertension 11/15/2016   • Diabetic polyneuropathy ([facility]) 06/01/2016   • Hyperlipidemia 04/27/2016           Past Surgical History:   Procedure Laterality Date   • FOOT SURGERY      2016  foot surgery X5 2017   • LUNG BIOPSY  1996   • TRANS METATARSAL AMPUTATION Left 12/28/2018     Current Outpatient Medications on File Prior to Visit   Medication Sig   • amLODIPine (NORVASC) 10 MG tablet Take 1 tablet by mouth Daily.   • atorvastatin (Lipitor) 40 MG tablet Take 1 tablet by mouth Daily.   • Dapagliflozin Propanediol (Farxiga) 10 MG tablet Take 10 mg by mouth Daily.   • Janumet  MG per tablet Take 1 tablet by mouth 2 (Two) Times a Day.   • Lantus SoloStar 100 UNIT/ML injection pen INJECT 70 UNITS SUBCUTANEOUSLY IN THE EVENING   • lisinopril (PRINIVIL,ZESTRIL) 30 MG tablet Take 30 mg by mouth Daily.   • ReliOn Pen Needles 32G X 4 MM misc Use to inject insulin once per day     No current facility-administered medications on file prior to visit.     Allergies   Allergen Reactions   • Trulicity [Dulaglutide] GI Intolerance   • Gluconic Acid Rash     Social History     Socioeconomic History   • Marital status:      Spouse name: Kenny   • Number of children: 0   Tobacco Use   • Smoking status: Never Smoker   • Smokeless tobacco: Never Used   Vaping Use   • Vaping Use: Never used   Substance and Sexual Activity   • Alcohol use: No   • Drug use: No   • Sexual activity: Yes     Partners: Female     Birth control/protection: None     Family History   Problem Relation Age of Onset   • Kidney nephrosis Father        Review of Systems    Objective   /98 (BP Location: Left arm, Patient Position: Sitting, Cuff Size: Large Adult)   Pulse 76   Temp 97.8 °F (36.6 °C) (Infrared)   Resp 18   Ht 182.9 cm (72\")   Wt (!) 142 kg (312 lb " 12.8 oz)   SpO2 97%   BMI 42.42 kg/m²   Physical Exam  Constitutional:       Appearance: He is well-developed and overweight. He is not toxic-appearing.      Comments: Wearing a face mask     HENT:      Head: Normocephalic and atraumatic.   Eyes:      Conjunctiva/sclera: Conjunctivae normal.   Cardiovascular:      Rate and Rhythm: Normal rate.   Pulmonary:      Effort: Pulmonary effort is normal. No respiratory distress.   Musculoskeletal:         General: Normal range of motion.      Cervical back: Normal range of motion.      Right lower leg: No edema.      Left lower leg: No edema.      Comments: He has protective diabetic foot wear on.   Skin:     General: Skin is warm and dry.      Findings: No rash.   Neurological:      Mental Status: He is alert and oriented to person, place, and time.      Gait: Gait normal.   Psychiatric:         Mood and Affect: Mood normal.         Behavior: Behavior normal.           Office Visit on 05/31/2022   Component Date Value Ref Range Status   • Total Cholesterol 08/24/2022 165  100 - 199 mg/dL Final   • Triglycerides 08/24/2022 122  0 - 149 mg/dL Final   • HDL Cholesterol 08/24/2022 41  >39 mg/dL Final   • VLDL Cholesterol Lonny 08/24/2022 22  5 - 40 mg/dL Final   • LDL Chol Calc (NIH) 08/24/2022 102 (A) 0 - 99 mg/dL Final   • Hemoglobin A1C 08/24/2022 8.3 (A) 4.8 - 5.6 % Final    Comment:          Prediabetes: 5.7 - 6.4           Diabetes: >6.4           Glycemic control for adults with diabetes: <7.0     • Glucose 08/24/2022 154 (A) 65 - 99 mg/dL Final   • BUN 08/24/2022 22  6 - 24 mg/dL Final   • Creatinine 08/24/2022 0.87  0.76 - 1.27 mg/dL Final   • EGFR Result 08/24/2022 106  >59 mL/min/1.73 Final   • BUN/Creatinine Ratio 08/24/2022 25 (A) 9 - 20 Final   • Sodium 08/24/2022 136  134 - 144 mmol/L Final   • Potassium 08/24/2022 4.8  3.5 - 5.2 mmol/L Final   • Chloride 08/24/2022 97  96 - 106 mmol/L Final   • Total CO2 08/24/2022 23  20 - 29 mmol/L Final   • Calcium  08/24/2022 9.6  8.7 - 10.2 mg/dL Final   • Total Protein 08/24/2022 7.1  6.0 - 8.5 g/dL Final   • Albumin 08/24/2022 4.3  4.0 - 5.0 g/dL Final   • Globulin 08/24/2022 2.8  1.5 - 4.5 g/dL Final   • A/G Ratio 08/24/2022 1.5  1.2 - 2.2 Final   • Total Bilirubin 08/24/2022 0.3  0.0 - 1.2 mg/dL Final   • Alkaline Phosphatase 08/24/2022 81  44 - 121 IU/L Final   • AST (SGOT) 08/24/2022 13  0 - 40 IU/L Final   • ALT (SGPT) 08/24/2022 14  0 - 44 IU/L Final         Assessment & Plan   Diagnoses and all orders for this visit:    1. Benign hypertension (Primary)    2. Other hyperlipidemia    3. White coat syndrome with diagnosis of hypertension    4. Type 2 diabetes mellitus with diabetic polyneuropathy, with long-term current use of insulin (HCC)  -     nateglinide (Starlix) 120 MG tablet; Take 1 tablet by mouth 3 (Three) Times a Day Before Meals.  Dispense: 90 tablet; Refill: 3  -     Comprehensive Metabolic Panel; Future  -     Hemoglobin A1c; Future    5. Right Charcot foot due to diabetes mellitus (HCC)    6. Other fatigue  -     CBC & Differential; Future  -     Testosterone; Future    Multiple chronic medical problems reviewed as above.  Hypertension is chronic problem which is currently stable at current doses of amlodipine and lisinopril.  Continue Lipitor 40 mg for now.  May not increase later or change to Crestor.  He has had great improvement in his diabetes control and I would like to focus on that for a bit longer.  I do not think small incremental increases in his Lantus are going to bring his A1c down much more.  Will add Starlix 1 tablet before each meal up to 3 times a day.  If he has to skip a meal, skip the Starlix.    With regard to his fatigue, it could be age, deconditioning.  He is not known to be anemic.  He does not have hypothyroidism.  Will add a testosterone to his next lab work which he will do a few days before his follow-up visit in 3 months.        Call with any problems or concerns before next  visit       Return in about 3 months (around 11/30/2022), or with labs a few days before.      Much of this report is an electronic transcription of spoken language to printed text using Dragon dictation software.  As such, the subtleties and finesse of spoken language may permit erroneous, or at times, nonsensical words or phrases to be inadvertently transcribed; thus changes may be made at a later date to rectify these errors.     Pricilla Larose MD8/31/202209:25 EDT  This note has been electronically signed

## 2022-08-31 ENCOUNTER — OFFICE VISIT (OUTPATIENT)
Dept: FAMILY MEDICINE CLINIC | Facility: CLINIC | Age: 47
End: 2022-08-31

## 2022-08-31 VITALS
DIASTOLIC BLOOD PRESSURE: 98 MMHG | BODY MASS INDEX: 42.37 KG/M2 | HEIGHT: 72 IN | WEIGHT: 312.8 LBS | HEART RATE: 76 BPM | OXYGEN SATURATION: 97 % | TEMPERATURE: 97.8 F | RESPIRATION RATE: 18 BRPM | SYSTOLIC BLOOD PRESSURE: 142 MMHG

## 2022-08-31 DIAGNOSIS — I10 WHITE COAT SYNDROME WITH DIAGNOSIS OF HYPERTENSION: ICD-10-CM

## 2022-08-31 DIAGNOSIS — E11.42 TYPE 2 DIABETES MELLITUS WITH DIABETIC POLYNEUROPATHY, WITH LONG-TERM CURRENT USE OF INSULIN: ICD-10-CM

## 2022-08-31 DIAGNOSIS — R53.83 OTHER FATIGUE: ICD-10-CM

## 2022-08-31 DIAGNOSIS — Z79.4 TYPE 2 DIABETES MELLITUS WITH DIABETIC POLYNEUROPATHY, WITH LONG-TERM CURRENT USE OF INSULIN: ICD-10-CM

## 2022-08-31 DIAGNOSIS — E78.49 OTHER HYPERLIPIDEMIA: ICD-10-CM

## 2022-08-31 DIAGNOSIS — I10 BENIGN HYPERTENSION: Primary | ICD-10-CM

## 2022-08-31 DIAGNOSIS — E11.610 CHARCOT FOOT DUE TO DIABETES MELLITUS: ICD-10-CM

## 2022-08-31 PROCEDURE — 99214 OFFICE O/P EST MOD 30 MIN: CPT | Performed by: FAMILY MEDICINE

## 2022-08-31 RX ORDER — NATEGLINIDE 120 MG/1
120 TABLET ORAL
Qty: 90 TABLET | Refills: 3 | Status: SHIPPED | OUTPATIENT
Start: 2022-08-31

## 2022-10-10 DIAGNOSIS — E11.42 TYPE 2 DIABETES MELLITUS WITH DIABETIC POLYNEUROPATHY, WITH LONG-TERM CURRENT USE OF INSULIN: ICD-10-CM

## 2022-10-10 DIAGNOSIS — Z79.4 TYPE 2 DIABETES MELLITUS WITH DIABETIC POLYNEUROPATHY, WITH LONG-TERM CURRENT USE OF INSULIN: ICD-10-CM

## 2022-10-10 RX ORDER — INSULIN GLARGINE 100 [IU]/ML
INJECTION, SOLUTION SUBCUTANEOUS
Qty: 15 ML | Refills: 0 | Status: SHIPPED | OUTPATIENT
Start: 2022-10-10 | End: 2022-11-04

## 2022-10-27 ENCOUNTER — OFFICE VISIT (OUTPATIENT)
Dept: PODIATRY | Facility: CLINIC | Age: 47
End: 2022-10-27

## 2022-10-27 VITALS — RESPIRATION RATE: 20 BRPM | HEIGHT: 72 IN | WEIGHT: 312 LBS | BODY MASS INDEX: 42.26 KG/M2

## 2022-10-27 DIAGNOSIS — L84 PRE-ULCERATIVE CALLUSES: Primary | ICD-10-CM

## 2022-10-27 DIAGNOSIS — Z89.432 HISTORY OF TRANSMETATARSAL AMPUTATION OF LEFT FOOT: ICD-10-CM

## 2022-10-27 DIAGNOSIS — E11.42 DM TYPE 2 WITH DIABETIC PERIPHERAL NEUROPATHY: ICD-10-CM

## 2022-10-27 PROCEDURE — 99213 OFFICE O/P EST LOW 20 MIN: CPT | Performed by: PODIATRIST

## 2022-10-27 NOTE — PROGRESS NOTES
"10/27/2022  Foot and Ankle Surgery - Established Patient/Follow-up  Provider: Dr. Roc Smith DPM  Location: [facility]    Subjective:  Sánchez Spaulding is a 47 y.o. male.     Chief Complaint   Patient presents with   • Right Foot - Callouses, Diabetes, Peripheral Neuropathy     Dm foot care   • Left Foot - Callouses, Diabetes, Peripheral Neuropathy     Dm foot care   • Follow-up     ANKITA Larose md 8/31/2022       HPI: Sánchez Spaulding is a 47-year-old male who presents to the office today for a routine diabetic foot check.    The patient states he is doing well. His recent A1c lowered to 7.8%. Additionally, he notes work is going well and he continues to work on lowering his A1c. He received shoes and inserts at end of 08/2022.    Allergies   Allergen Reactions   • Trulicity [Dulaglutide] GI Intolerance   • Gluconic Acid Rash       Current Outpatient Medications on File Prior to Visit   Medication Sig Dispense Refill   • amLODIPine (NORVASC) 10 MG tablet Take 1 tablet by mouth Daily. 90 tablet 3   • atorvastatin (Lipitor) 40 MG tablet Take 1 tablet by mouth Daily. 90 tablet 3   • Dapagliflozin Propanediol (Farxiga) 10 MG tablet Take 10 mg by mouth Daily. 90 tablet 3   • Janumet  MG per tablet Take 1 tablet by mouth 2 (Two) Times a Day. 180 tablet 3   • Lantus SoloStar 100 UNIT/ML injection pen INJECT 70 UNITS SUBCUTANEOUSLY IN THE EVENING 15 mL 0   • lisinopril (PRINIVIL,ZESTRIL) 30 MG tablet Take 30 mg by mouth Daily.     • nateglinide (Starlix) 120 MG tablet Take 1 tablet by mouth 3 (Three) Times a Day Before Meals. 90 tablet 3   • ReliOn Pen Needles 32G X 4 MM misc Use to inject insulin once per day 100 each 3     No current facility-administered medications on file prior to visit.       Objective   Resp 20   Ht 182.9 cm (72\")   Wt (!) 142 kg (312 lb)   BMI 42.31 kg/m²     Foot/Ankle Exam  Inspection and Palpation  Ecchymosis - Right: none Left: none  Tenderness - " Right: none   Left: none   Swelling - Right: none   Left: none    Arch - Right: pes planus (Mild rocker-bottom deformity) Left: normal  Hammertoes - Left: absent  Claw Toes - Left: absent  Mallet Toes - Left: absent  Hallux Valgus - Left: no  Hallux Limitus - Left: no  Skin - No open wound or signs of infection.  No hyperkeratotic lesions.     Vascular  Dorsalis Pedis - Right: 3+ Left: 3+  Posterior Tibial - Right: 3+ Left: 3+  Skin Temperature -  Right: warm  Left: warm    CFT - Right: < 3 seconds Left: < 3 seconds     Neurologic  Saphenous Nerve Sensation  - Right: diminished Left: diminished  Tibial Nerve Sensation - Right: diminished Left: diminished  Superficial Peroneal Nerve Sensation - Right: diminished Left: diminished  Deep Peroneal Nerve Sensation - Right: diminished Left: diminished  Sural Nerve Sensation - Right: diminished Left: diminished  Protective Sensation using [place] Monofilament - Right: 2 Left: 2  Achilles Reflex - Right: 2+ Left: 2+     Muscle Strength  Dorsiflexion - Right: 5 Left: 5  Plantar Flexion - Right: 5 Left: 5  Eversion - Right: 5 Left: 5  Inversion - Right: 5 Left: 5  Great Toe Extension - Right: 5 Left: 5  Great Toe Flexion - Right: 5 Left: 5     Right Foot/Ankle Comments  No open wounds or signs of infection.  No progressive deformity or instability  Left Foot/Ankle Comments  S/p transmetatarsal amputation -stable    Assessment & Plan   Diagnoses and all orders for this visit:    1. Pre-ulcerative calluses (Primary)    2. DM type 2 with diabetic peripheral neuropathy (HCC)    3. History of transmetatarsal amputation of left foot (HCC)      Patient returns for routine diabetic foot check.  He has been doing quite well.  Denies any new issues or concerns.  On evaluation today, he does not have any significant callusing or concerns of wounds.  He feels that his overall health has continued to improve with recent A1c of less than 8%.  I do feel that he is doing well  overall.  I have asked that he continue to wear his diabetic shoes and inserts on a daily basis.  We did review the importance of daily diabetic foot checks and glycemic control.  Patient is to return in 1 year for annual foot check.  He is to call with any issues or concerns.  Greater than 20 minutes was spent before, during, and after evaluation for patient care.    No orders of the defined types were placed in this encounter.         Note is dictated utilizing voice recognition software. Unfortunately this leads to occasional typographical errors. I apologize in advance if the situation occurs. If questions occur please do not hesitate to call our office.    Transcribed from ambient dictation for RENETTA Smith DPM by Merlin Pleitez.  10/27/22   08:32 EDT    Patient or patient representative verbalized consent to the visit recording.  I have personally performed the services described in this document as transcribed by the above individual, and it is both accurate and complete.

## 2022-11-04 DIAGNOSIS — Z79.4 TYPE 2 DIABETES MELLITUS WITH DIABETIC POLYNEUROPATHY, WITH LONG-TERM CURRENT USE OF INSULIN: ICD-10-CM

## 2022-11-04 DIAGNOSIS — E11.42 TYPE 2 DIABETES MELLITUS WITH DIABETIC POLYNEUROPATHY, WITH LONG-TERM CURRENT USE OF INSULIN: ICD-10-CM

## 2022-11-04 RX ORDER — INSULIN GLARGINE 100 [IU]/ML
INJECTION, SOLUTION SUBCUTANEOUS
Qty: 15 ML | Refills: 0 | Status: SHIPPED | OUTPATIENT
Start: 2022-11-04 | End: 2022-12-30

## 2022-11-04 RX ORDER — INSULIN GLARGINE 100 [IU]/ML
INJECTION, SOLUTION SUBCUTANEOUS
Qty: 15 ML | Refills: 0 | OUTPATIENT
Start: 2022-11-04

## 2022-11-29 LAB
ALBUMIN SERPL-MCNC: 4.5 G/DL (ref 4–5)
ALBUMIN/GLOB SERPL: 1.7 {RATIO} (ref 1.2–2.2)
ALP SERPL-CCNC: 91 IU/L (ref 44–121)
ALT SERPL-CCNC: 17 IU/L (ref 0–44)
AST SERPL-CCNC: 17 IU/L (ref 0–40)
BASOPHILS # BLD AUTO: 0 X10E3/UL (ref 0–0.2)
BASOPHILS NFR BLD AUTO: 1 %
BILIRUB SERPL-MCNC: 0.3 MG/DL (ref 0–1.2)
BUN SERPL-MCNC: 15 MG/DL (ref 6–24)
BUN/CREAT SERPL: 15 (ref 9–20)
CALCIUM SERPL-MCNC: 9.4 MG/DL (ref 8.7–10.2)
CHLORIDE SERPL-SCNC: 97 MMOL/L (ref 96–106)
CO2 SERPL-SCNC: 25 MMOL/L (ref 20–29)
CREAT SERPL-MCNC: 0.97 MG/DL (ref 0.76–1.27)
EGFRCR SERPLBLD CKD-EPI 2021: 96 ML/MIN/1.73
EOSINOPHIL # BLD AUTO: 0.4 X10E3/UL (ref 0–0.4)
EOSINOPHIL NFR BLD AUTO: 5 %
ERYTHROCYTE [DISTWIDTH] IN BLOOD BY AUTOMATED COUNT: 13 % (ref 11.6–15.4)
GLOBULIN SER CALC-MCNC: 2.7 G/DL (ref 1.5–4.5)
GLUCOSE SERPL-MCNC: 136 MG/DL (ref 70–99)
HBA1C MFR BLD: 7.5 % (ref 4.8–5.6)
HCT VFR BLD AUTO: 41.9 % (ref 37.5–51)
HGB BLD-MCNC: 14.3 G/DL (ref 13–17.7)
IMM GRANULOCYTES # BLD AUTO: 0.1 X10E3/UL (ref 0–0.1)
IMM GRANULOCYTES NFR BLD AUTO: 1 %
LYMPHOCYTES # BLD AUTO: 2.3 X10E3/UL (ref 0.7–3.1)
LYMPHOCYTES NFR BLD AUTO: 30 %
MCH RBC QN AUTO: 30.5 PG (ref 26.6–33)
MCHC RBC AUTO-ENTMCNC: 34.1 G/DL (ref 31.5–35.7)
MCV RBC AUTO: 89 FL (ref 79–97)
MONOCYTES # BLD AUTO: 0.5 X10E3/UL (ref 0.1–0.9)
MONOCYTES NFR BLD AUTO: 6 %
NEUTROPHILS # BLD AUTO: 4.6 X10E3/UL (ref 1.4–7)
NEUTROPHILS NFR BLD AUTO: 57 %
PLATELET # BLD AUTO: 219 X10E3/UL (ref 150–450)
POTASSIUM SERPL-SCNC: 5 MMOL/L (ref 3.5–5.2)
PROT SERPL-MCNC: 7.2 G/DL (ref 6–8.5)
RBC # BLD AUTO: 4.69 X10E6/UL (ref 4.14–5.8)
SODIUM SERPL-SCNC: 136 MMOL/L (ref 134–144)
TESTOST SERPL-MCNC: 427 NG/DL (ref 264–916)
WBC # BLD AUTO: 7.8 X10E3/UL (ref 3.4–10.8)

## 2022-11-30 ENCOUNTER — OFFICE VISIT (OUTPATIENT)
Dept: FAMILY MEDICINE CLINIC | Facility: CLINIC | Age: 47
End: 2022-11-30

## 2022-11-30 VITALS
HEIGHT: 72 IN | DIASTOLIC BLOOD PRESSURE: 88 MMHG | TEMPERATURE: 97.3 F | OXYGEN SATURATION: 99 % | BODY MASS INDEX: 41.47 KG/M2 | WEIGHT: 306.2 LBS | SYSTOLIC BLOOD PRESSURE: 140 MMHG | HEART RATE: 94 BPM | RESPIRATION RATE: 16 BRPM

## 2022-11-30 DIAGNOSIS — E78.49 OTHER HYPERLIPIDEMIA: ICD-10-CM

## 2022-11-30 DIAGNOSIS — R53.83 OTHER FATIGUE: ICD-10-CM

## 2022-11-30 DIAGNOSIS — I10 BENIGN HYPERTENSION: Primary | ICD-10-CM

## 2022-11-30 DIAGNOSIS — I10 WHITE COAT SYNDROME WITH DIAGNOSIS OF HYPERTENSION: ICD-10-CM

## 2022-11-30 DIAGNOSIS — Z79.4 TYPE 2 DIABETES MELLITUS WITH DIABETIC POLYNEUROPATHY, WITH LONG-TERM CURRENT USE OF INSULIN: ICD-10-CM

## 2022-11-30 DIAGNOSIS — E11.610 CHARCOT FOOT DUE TO DIABETES MELLITUS: ICD-10-CM

## 2022-11-30 DIAGNOSIS — E11.42 TYPE 2 DIABETES MELLITUS WITH DIABETIC POLYNEUROPATHY, WITH LONG-TERM CURRENT USE OF INSULIN: ICD-10-CM

## 2022-11-30 PROCEDURE — 99214 OFFICE O/P EST MOD 30 MIN: CPT | Performed by: FAMILY MEDICINE

## 2022-11-30 NOTE — PROGRESS NOTES
Subjective   Sánchez Spaulding is a 47 y.o. male.   Chief Complaint   Patient presents with   • Diabetes   • Hypertension   • Hyperlipidemia       History of Present Illness   Presents to the office today to follow-up on multiple chronic medical problems for active problems as below.    HTN- blood pressure in the office today is nearly at goal at 140/88.  Tolerating amlodipine without side effects such as swelling.  Tolerating lisinopril without side effects such as cough.    Diabetes type 2 with history of polyneuropathy and microalbuminuria.  Had labs done a few days ago.  His A1c was down to 7.5%.  1 year ago it was around 10.9.  Tolerating all current medicines without side effects.  CMP was grossly normal.  CBC also was normal hemoglobin was good at 14.3.  Other complicating factor is right Charcot foot.  He has been released to once yearly follow-up by his podiatrist.  No symptoms of hypoglycemia.    HLD -he is on Lipitor 40 mg/day.  Last lipid panel was 3 months ago in August and it was excellent.    Fatigue -I added the testosterone level to his labs and it was normal at 427.  He had a TSH earlier this year that was also normal at 2.43.    Patient Active Problem List    Diagnosis Date Noted   • Morbid obesity (HCC) 05/31/2022   • Microalbuminuria due to type 2 diabetes mellitus ([facility]) 01/23/2022   • White coat syndrome with diagnosis of hypertension 01/11/2022   • Right Charcot foot due to diabetes mellitus ([facility]) 01/11/2022     Note Last Updated: 1/11/2022     right     • Type 2 diabetes mellitus with diabetic polyneuropathy, with long-term current use of insulin ([facility])      Note Last Updated: 1/26/2022     DX in mid - 30's     • Severe nonproliferative diabetic retinopathy associated with type 2 diabetes mellitus ([facility]) 02/15/2021   • Benign hypertension 11/15/2016   • Diabetic polyneuropathy ([facility]) 06/01/2016   • Hyperlipidemia 04/27/2016           Past Surgical History:   Procedure Laterality Date   • EYE  "SURGERY  2021   • FOOT SURGERY      2016  foot surgery X5 2017   • LUNG BIOPSY  1996   • TRANS METATARSAL AMPUTATION Left 12/28/2018     Current Outpatient Medications on File Prior to Visit   Medication Sig   • amLODIPine (NORVASC) 10 MG tablet Take 1 tablet by mouth Daily.   • atorvastatin (Lipitor) 40 MG tablet Take 1 tablet by mouth Daily.   • Dapagliflozin Propanediol (Farxiga) 10 MG tablet Take 10 mg by mouth Daily.   • Janumet  MG per tablet Take 1 tablet by mouth 2 (Two) Times a Day.   • Lantus SoloStar 100 UNIT/ML injection pen INJECT 70 UNITS SUBCUTANEOUSLY IN THE EVENING   • lisinopril (PRINIVIL,ZESTRIL) 30 MG tablet Take 30 mg by mouth Daily.   • nateglinide (Starlix) 120 MG tablet Take 1 tablet by mouth 3 (Three) Times a Day Before Meals.   • ReliOn Pen Needles 32G X 4 MM misc Use to inject insulin once per day     No current facility-administered medications on file prior to visit.     Allergies   Allergen Reactions   • Trulicity [Dulaglutide] GI Intolerance   • Gluconic Acid Rash     Social History     Socioeconomic History   • Marital status:      Spouse name: Kenny   • Number of children: 0   Tobacco Use   • Smoking status: Never     Passive exposure: Never   • Smokeless tobacco: Never   Vaping Use   • Vaping Use: Never used   Substance and Sexual Activity   • Alcohol use: Yes     Comment: Maybe 2 every two months   • Drug use: No   • Sexual activity: Yes     Partners: Female     Birth control/protection: None     Family History   Problem Relation Age of Onset   • Kidney nephrosis Father    • Cancer Mother    • Cancer Maternal Grandfather    • Diabetes Maternal Aunt        Review of Systems    Objective   /88 (BP Location: Left arm, Patient Position: Sitting, Cuff Size: Large Adult)   Pulse 94   Temp 97.3 °F (36.3 °C) (Infrared)   Resp 16   Ht 182.9 cm (72\")   Wt (!) 139 kg (306 lb 3.2 oz)   SpO2 99%   BMI 41.53 kg/m²   Physical Exam  Constitutional:       Appearance: He " is well-developed and overweight. He is not toxic-appearing.      Comments: Wearing a face mask     HENT:      Head: Normocephalic and atraumatic.   Eyes:      Conjunctiva/sclera: Conjunctivae normal.   Cardiovascular:      Rate and Rhythm: Normal rate.   Pulmonary:      Effort: Pulmonary effort is normal. No respiratory distress.   Musculoskeletal:         General: Normal range of motion.      Cervical back: Normal range of motion.      Right lower leg: No edema.      Left lower leg: No edema.      Comments: He has protective diabetic foot wear on.   Skin:     General: Skin is warm and dry.      Findings: No rash.   Neurological:      Mental Status: He is alert and oriented to person, place, and time.      Gait: Gait normal.   Psychiatric:         Mood and Affect: Mood normal.         Behavior: Behavior normal.           Office Visit on 08/31/2022   Component Date Value Ref Range Status   • Testosterone, Total 11/28/2022 427  264 - 916 ng/dL Final    Comment: Adult male reference interval is based on a population of  healthy nonobese males (BMI <30) between 19 and 39 years old.  Josette et.al. JCEM 2017,102;3752-1125. PMID: [id number].     • WBC 11/28/2022 7.8  3.4 - 10.8 x10E3/uL Final   • RBC 11/28/2022 4.69  4.14 - 5.80 x10E6/uL Final   • Hemoglobin 11/28/2022 14.3  13.0 - 17.7 g/dL Final   • Hematocrit 11/28/2022 41.9  37.5 - 51.0 % Final   • MCV 11/28/2022 89  79 - 97 fL Final   • MCH 11/28/2022 30.5  26.6 - 33.0 pg Final   • MCHC 11/28/2022 34.1  31.5 - 35.7 g/dL Final   • RDW 11/28/2022 13.0  11.6 - 15.4 % Final   • Platelets 11/28/2022 219  150 - 450 x10E3/uL Final   • Neutrophil Rel % 11/28/2022 57  Not Estab. % Final   • Lymphocyte Rel % 11/28/2022 30  Not Estab. % Final   • Monocyte Rel % 11/28/2022 6  Not Estab. % Final   • Eosinophil Rel % 11/28/2022 5  Not Estab. % Final   • Basophil Rel % 11/28/2022 1  Not Estab. % Final   • Neutrophils Absolute 11/28/2022 4.6  1.4 - 7.0 x10E3/uL Final   •  Lymphocytes Absolute 11/28/2022 2.3  0.7 - 3.1 x10E3/uL Final   • Monocytes Absolute 11/28/2022 0.5  0.1 - 0.9 x10E3/uL Final   • Eosinophils Absolute 11/28/2022 0.4  0.0 - 0.4 x10E3/uL Final   • Basophils Absolute 11/28/2022 0.0  0.0 - 0.2 x10E3/uL Final   • Immature Granulocyte Rel % 11/28/2022 1  Not Estab. % Final   • Immature Grans Absolute 11/28/2022 0.1  0.0 - 0.1 x10E3/uL Final   • Hemoglobin A1C 11/28/2022 7.5 (H)  4.8 - 5.6 % Final    Comment:          Prediabetes: 5.7 - 6.4           Diabetes: >6.4           Glycemic control for adults with diabetes: <7.0     • Glucose 11/28/2022 136 (H)  70 - 99 mg/dL Final   • BUN 11/28/2022 15  6 - 24 mg/dL Final   • Creatinine 11/28/2022 0.97  0.76 - 1.27 mg/dL Final   • EGFR Result 11/28/2022 96  >59 mL/min/1.73 Final   • BUN/Creatinine Ratio 11/28/2022 15  9 - 20 Final   • Sodium 11/28/2022 136  134 - 144 mmol/L Final   • Potassium 11/28/2022 5.0  3.5 - 5.2 mmol/L Final   • Chloride 11/28/2022 97  96 - 106 mmol/L Final   • Total CO2 11/28/2022 25  20 - 29 mmol/L Final   • Calcium 11/28/2022 9.4  8.7 - 10.2 mg/dL Final   • Total Protein 11/28/2022 7.2  6.0 - 8.5 g/dL Final   • Albumin 11/28/2022 4.5  4.0 - 5.0 g/dL Final   • Globulin 11/28/2022 2.7  1.5 - 4.5 g/dL Final   • A/G Ratio 11/28/2022 1.7  1.2 - 2.2 Final   • Total Bilirubin 11/28/2022 0.3  0.0 - 1.2 mg/dL Final   • Alkaline Phosphatase 11/28/2022 91  44 - 121 IU/L Final   • AST (SGOT) 11/28/2022 17  0 - 40 IU/L Final   • ALT (SGPT) 11/28/2022 17  0 - 44 IU/L Final         Assessment & Plan   Diagnoses and all orders for this visit:    1. Benign hypertension (Primary)    2. White coat syndrome with diagnosis of hypertension    3. Other hyperlipidemia    4. Type 2 diabetes mellitus with diabetic polyneuropathy, with long-term current use of insulin (HCC)  -     Magnesium; Future  -     Comprehensive Metabolic Panel; Future  -     Hemoglobin A1c; Future    5. Other fatigue    6. Right Charcot foot due to  diabetes mellitus (HCC)    Status of multiple chronic medical problems reviewed today.  Hypertension is stable.  Continue amlodipine and lisinopril at current doses.  Continue Lipitor 40 mg/day for management of his high cholesterol.  Fatigue is likely multifactorial.  Advised him to make sure he gets enough sleep, stays well-hydrated.  Exercise if possible.  Diabetes control has steadily improved over the past year.  Will not make any changes to his diabetes regimen at this time.  I think we will continue to see a little bit of downward trend of his A1c.  He reports that he has had a lot more stress than usual at work as he has into the holiday season.  He manages a large retail store.  Continue to follow glucoses at home.  Follow-up here in 3 months with lab work a few days before as above.        Call with any problems or concerns before next visit       Return in about 3 months (around 2/28/2023), or with labs a few days before.      Much of this report is an electronic transcription of spoken language to printed text using Dragon dictation software.  As such, the subtleties and finesse of spoken language may permit erroneous, or at times, nonsensical words or phrases to be inadvertently transcribed; thus changes may be made at a later date to rectify these errors.     Pricilla Larose MD11/30/202210:09 EST  This note has been electronically signed

## 2022-12-30 DIAGNOSIS — Z79.4 TYPE 2 DIABETES MELLITUS WITH DIABETIC POLYNEUROPATHY, WITH LONG-TERM CURRENT USE OF INSULIN: ICD-10-CM

## 2022-12-30 DIAGNOSIS — E11.42 TYPE 2 DIABETES MELLITUS WITH DIABETIC POLYNEUROPATHY, WITH LONG-TERM CURRENT USE OF INSULIN: ICD-10-CM

## 2022-12-30 RX ORDER — INSULIN GLARGINE 100 [IU]/ML
INJECTION, SOLUTION SUBCUTANEOUS
Qty: 15 ML | Refills: 3 | Status: SHIPPED | OUTPATIENT
Start: 2022-12-30

## 2023-01-06 ENCOUNTER — OFFICE VISIT (OUTPATIENT)
Dept: PODIATRY | Facility: CLINIC | Age: 48
End: 2023-01-06
Payer: COMMERCIAL

## 2023-01-06 VITALS — WEIGHT: 306 LBS | BODY MASS INDEX: 41.45 KG/M2 | RESPIRATION RATE: 20 BRPM | HEIGHT: 72 IN

## 2023-01-06 DIAGNOSIS — L84 PRE-ULCERATIVE CALLUSES: Primary | ICD-10-CM

## 2023-01-06 DIAGNOSIS — Z89.432 HISTORY OF TRANSMETATARSAL AMPUTATION OF LEFT FOOT: ICD-10-CM

## 2023-01-06 DIAGNOSIS — L97.422 DIABETIC ULCER OF LEFT MIDFOOT ASSOCIATED WITH DIABETES MELLITUS DUE TO UNDERLYING CONDITION, WITH FAT LAYER EXPOSED: ICD-10-CM

## 2023-01-06 DIAGNOSIS — E08.621 DIABETIC ULCER OF LEFT MIDFOOT ASSOCIATED WITH DIABETES MELLITUS DUE TO UNDERLYING CONDITION, WITH FAT LAYER EXPOSED: ICD-10-CM

## 2023-01-06 DIAGNOSIS — E11.42 DM TYPE 2 WITH DIABETIC PERIPHERAL NEUROPATHY: ICD-10-CM

## 2023-01-06 PROCEDURE — 11055 PARING/CUTG B9 HYPRKER LES 1: CPT

## 2023-01-06 PROCEDURE — 99213 OFFICE O/P EST LOW 20 MIN: CPT

## 2023-01-06 NOTE — PROGRESS NOTES
01/06/2023  Foot and Ankle Surgery - Established Patient/Follow-up  Provider: JOHANNA Steele   Location: [facility]    Subjective:  Sánchez Spaulding is a 47 y.o. male.     Chief Complaint   Patient presents with   • Left Foot - Follow-up, Callouses, Diabetes, Peripheral Neuropathy, Wound Check   • Follow-up     ANKITA GAVIRIA MD 12/2022  date unknown       HPI: The patient is a 47-year-old male who is here today for a left foot blister. He is accompanied by an adult female. He was last seen in clinic in 10/2022.    The patient complains of dryness and an open blister on the left foot the point of it cracking open. The adult female states the left foot blister was noticed right before 12/25/2022, as the patient was ambulating and standing more often. His blister is located on the lateral side of the left foot. He states he has been keeping the blister covered and using Betadine. No imaging was taken today, he notes. The patient states he continues to wear is diabetic shoes. He states he has a CAM walking boot and a knee scooter at home. The adult female states the patient's blister has not worsened. He denies any blood flow concerns to his feet bilaterally nor any signs of drainage from the open blister. He states he has a history of blister wounds. He denies any fever or chills at this time.    The patient has diabetes and states his glucose levels are doing well, averaging around 130 mg/dL. He states yesterday morning, 01/05/2023, his glucose was 96. He states his hemoglobin A1c is currently 7.5.    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 He and his family live in [place], [place].    Allergies   Allergen Reactions   • Trulicity [Dulaglutide] GI Intolerance   • Gluconic Acid Rash       Current Outpatient Medications on File Prior to Visit   Medication Sig Dispense Refill   • amLODIPine (NORVASC) 10 MG tablet Take 1 tablet by mouth Daily. 90 tablet 3   • atorvastatin (Lipitor) 40 MG  tablet Take 1 tablet by mouth Daily. 90 tablet 3   • Dapagliflozin Propanediol (Farxiga) 10 MG tablet Take 10 mg by mouth Daily. 90 tablet 3   • Janumet  MG per tablet Take 1 tablet by mouth 2 (Two) Times a Day. 180 tablet 3   • Lantus SoloStar 100 UNIT/ML injection pen INJECT 70 UNITS SUBCUTANEOUSLY IN THE EVENING 15 mL 3   • lisinopril (PRINIVIL,ZESTRIL) 30 MG tablet Take 30 mg by mouth Daily.     • nateglinide (Starlix) 120 MG tablet Take 1 tablet by mouth 3 (Three) Times a Day Before Meals. 90 tablet 3   • ReliOn Pen Needles 32G X 4 MM misc Use to inject insulin once per day 100 each 3     No current facility-administered medications on file prior to visit.       Objective   Resp 20   Ht 182.9 cm (72\")   Wt (!) 139 kg (306 lb)   BMI 41.50 kg/m²     Foot/Ankle Exam:       General:   Appearance: appears stated age and healthy    Orientation: AAOx3    Affect: appropriate      VASCULAR      Left Foot Vascularity   Normal vascular exam    Dorsalis pedis:  2+  Posterior tibial:  2+  Skin Temperature: warm    Edema Grading:  None  CFT:  < 3 seconds  Pedal Hair Growth:  Present  Varicosities: none       MUSCULOSKELETAL      Left Foot Musculoskeletal   Ecchymosis:  None  Tenderness: none       DERMATOLOGIC     Left Foot Dermatologic   Skin: skin intact    Nails: normal        Left Foot Additional Comments:  Diabetic foot ulcer to left plantar foot measures approximately 0.9 cm x 0.8 cm x 0.2 cm. Wound bed is red, moist, clean. Small serosanguineous drainage periwound with callus to the left plantar foot but otherwise clear, dry and intact. No signs of infection.        Assessment & Plan   Diagnoses and all orders for this visit:    1. Pre-ulcerative calluses (Primary)  -     XR Foot 3+ View Left    2. History of transmetatarsal amputation of left foot (HCC)    3. DM type 2 with diabetic peripheral neuropathy (HCC)    4. Diabetic ulcer of left midfoot associated with diabetes mellitus due to underlying  condition, with fat layer exposed (HCC)      Patient presents for left foot ulcer wound. We discussed the diagnosis and treatment options at length. We discussed the etiology and biomechanics involved with left foot symptoms and pain. Upon examination, the open wound is found on the lateral side of his left foot and a picture was taken of the wound. It does not look concerning to me at this time and there are no signs of infection. He also has a noticeable callus on the left foot. periwound callus was carefull paired away with curette.  He will wear his walking boot at work and during ambuation Otherwise, he needs to stay of his left foot as much as possible right now. The risks of worsening ulcer wound have been discussed. I will order the patient Endoform dressings for his left foot wound. He will need to change the dressing every other day. He has been instructed to use moisturizer on his left foot callus. Continued monitoring of diabetes has been discussed and advised. An x-ray of the left foot will be obtained today as well.                    Orders Placed This Encounter   Procedures   • XR Foot 3+ View Left     Order Specific Question:   Reason for Exam:     Answer:   LEFT FOOT PLANTAR WOUND  R/O OSTEOMYELITIS   ROOM 10  WB.  MAY LEAVE AFTER     Order Specific Question:   Does this patient have a diabetic monitoring/medication delivering device on?     Answer:   No     Order Specific Question:   Release to patient     Answer:   Routine Release        Transcribed from ambient dictation for JOHANNA Ramos by Alyson Figueredo.  01/06/23   16:47 EST    Patient or patient representative verbalized consent to the visit recording.  I have personally performed the services described in this document as transcribed by the above individual, and it is both accurate and complete.      Answers for HPI/ROS submitted by the patient on 1/3/2023  What is the primary reason for your visit?: Other  Please describe your  symptoms.: There is a pea sized open sore on the bottom of left foot and i want to make sure there is no infection starting and treatment of this wound  Have you had these symptoms before?: Yes  How long have you been having these symptoms?: 5-7 days  Please list any medications you are currently taking for this condition.: Betadine and gauze  Please describe any probable cause for these symptoms. : Walking too much

## 2023-01-09 ENCOUNTER — OFFICE VISIT (OUTPATIENT)
Dept: PODIATRY | Facility: CLINIC | Age: 48
End: 2023-01-09
Payer: COMMERCIAL

## 2023-01-09 VITALS — WEIGHT: 306 LBS | HEART RATE: 79 BPM | OXYGEN SATURATION: 97 % | BODY MASS INDEX: 41.45 KG/M2 | HEIGHT: 72 IN

## 2023-01-09 DIAGNOSIS — L97.522 CHRONIC ULCER OF LEFT FOOT WITH FAT LAYER EXPOSED: Primary | ICD-10-CM

## 2023-01-09 DIAGNOSIS — Z89.432 HISTORY OF TRANSMETATARSAL AMPUTATION OF LEFT FOOT: ICD-10-CM

## 2023-01-09 DIAGNOSIS — E11.42 DM TYPE 2 WITH DIABETIC PERIPHERAL NEUROPATHY: ICD-10-CM

## 2023-01-09 PROCEDURE — 99213 OFFICE O/P EST LOW 20 MIN: CPT | Performed by: PODIATRIST

## 2023-01-09 NOTE — PROGRESS NOTES
01/09/2023  Foot and Ankle Surgery - Established Patient/Follow-up  Provider: Dr. Roc Smith DPM  Location: [facility]    Subjective:  Sánchez Spaulding is a 47 y.o. male.     Chief Complaint   Patient presents with   • Left Foot - Wound Check, Diabetes   • Follow-up     DIEGO Larose MD 11/30/2022       HPI:   The patient is a 46-year-old male who presents to the clinic for a follow-up regarding his left foot. He is accompanied by an adult female.    He states he saw JOHANNA Zamora, last week. He reports he has got a spot that popped up on his left foot. He believes he may have been on his foot too much during the holidays. The adult female that accompanied him notes it started as a crack in the skin where it was dry and a callus. The patient states he had x-rays on 01/07/2023. He states he is applying Endoform and collagen, along with a sponge, to the wound.     He notes he will be on vacation for 12 days starting 01/13/2023.      Allergies   Allergen Reactions   • Trulicity [Dulaglutide] GI Intolerance   • Gluconic Acid Rash       Current Outpatient Medications on File Prior to Visit   Medication Sig Dispense Refill   • amLODIPine (NORVASC) 10 MG tablet Take 1 tablet by mouth Daily. 90 tablet 3   • atorvastatin (Lipitor) 40 MG tablet Take 1 tablet by mouth Daily. 90 tablet 3   • Dapagliflozin Propanediol (Farxiga) 10 MG tablet Take 10 mg by mouth Daily. 90 tablet 3   • Janumet  MG per tablet Take 1 tablet by mouth 2 (Two) Times a Day. 180 tablet 3   • Lantus SoloStar 100 UNIT/ML injection pen INJECT 70 UNITS SUBCUTANEOUSLY IN THE EVENING 15 mL 3   • lisinopril (PRINIVIL,ZESTRIL) 30 MG tablet Take 30 mg by mouth Daily.     • nateglinide (Starlix) 120 MG tablet Take 1 tablet by mouth 3 (Three) Times a Day Before Meals. 90 tablet 3   • ReliOn Pen Needles 32G X 4 MM misc Use to inject insulin once per day 100 each 3     No current facility-administered medications on file prior to visit.        Objective   Pulse 79   Ht 182.9 cm (72\")   Wt (!) 139 kg (306 lb)   SpO2 97%   BMI 41.50 kg/m²     Foot/Ankle Exam:       General:   Appearance: appears stated age and healthy    Orientation: AAOx3    Affect: appropriate      VASCULAR      Left Foot Vascularity   Normal vascular exam    Dorsalis pedis:  2+  Posterior tibial:  2+  Skin Temperature: warm    Edema Grading:  None  CFT:  < 3 seconds  Pedal Hair Growth:  Present  Varicosities: none       MUSCULOSKELETAL      Left Foot Musculoskeletal   Ecchymosis:  None  Tenderness: none       DERMATOLOGIC     Left Foot Dermatologic   Skin: skin intact    Nails: normal        Left Foot Additional Comments:  Diabetic foot ulcer to left plantar foot measures approximately 0.9 cm x 0.8 cm x 0.2 cm. Wound bed is red, moist, clean. Small serosanguineous drainage periwound with callus to the left plantar foot but otherwise clear, dry and intact. No signs of infection.    01/09/2023  The wound to the plantar aspect of the left mid-foot has a circular wound that measures approximately 1 cm in diameter with granular wound base. No signs of infection or further concerns.      Assessment & Plan   Diagnoses and all orders for this visit:    1. Chronic ulcer of left foot with fat layer exposed (HCC) (Primary)    2. History of transmetatarsal amputation of left foot (HCC)    3. DM type 2 with diabetic peripheral neuropathy (HCC)        The patient returns for follow-up regarding his left foot. I discussed with the patient that there is nothing that is prominent or down. I explained to the patient that his body is getting used to the foot structure, and it is trying to from a functional aspect. I informed him that we need to make sure that the wound heals itself up. I recommended that he continue to wear the boot and see me back in 2 weeks. I explained to him that if he does a lot of running around, to give me a call. Greater than 20 minutes spent before coming during coming  after evaluation of patient care.    No orders of the defined types were placed in this encounter.         Note is dictated utilizing voice recognition software. Unfortunately this leads to occasional typographical errors. I apologize in advance if the situation occurs. If questions occur please do not hesitate to call our office.    Transcribed from ambient dictation for RENETTA Smith DPM by Dulce Shirley.  01/09/23   14:32 EST    Patient or patient representative verbalized consent to the visit recording.  I have personally performed the services described in this document as transcribed by the above individual, and it is both accurate and complete.

## 2023-01-25 ENCOUNTER — OFFICE VISIT (OUTPATIENT)
Dept: PODIATRY | Facility: CLINIC | Age: 48
End: 2023-01-25
Payer: COMMERCIAL

## 2023-01-25 VITALS — WEIGHT: 306 LBS | OXYGEN SATURATION: 96 % | HEIGHT: 72 IN | BODY MASS INDEX: 41.45 KG/M2

## 2023-01-25 DIAGNOSIS — L97.522 CHRONIC ULCER OF LEFT FOOT WITH FAT LAYER EXPOSED: Primary | ICD-10-CM

## 2023-01-25 DIAGNOSIS — E11.42 DM TYPE 2 WITH DIABETIC PERIPHERAL NEUROPATHY: ICD-10-CM

## 2023-01-25 DIAGNOSIS — Z89.432 HISTORY OF TRANSMETATARSAL AMPUTATION OF LEFT FOOT: ICD-10-CM

## 2023-01-25 PROCEDURE — 99213 OFFICE O/P EST LOW 20 MIN: CPT | Performed by: PODIATRIST

## 2023-01-25 NOTE — PROGRESS NOTES
"01/25/2023  Foot and Ankle Surgery - Established Patient/Follow-up  Provider: Dr. Roc Smith DPM  Location: [facility]    Subjective:  Sánchez Spaulding is a 47 y.o. male.     Chief Complaint   Patient presents with   • Left Foot - Follow-up, Wound Check   • Follow-up     ANKITA GAVIRIA MD 11/30/2022       HPI:     The patient is a 47-year-old male who presents to the office today for a follow-up regarding his left foot. He is accompanied by an adult female.    He was last seen approximately 2 weeks ago. The patient reports gradual improvement in his left foot wound. He does note mild tenderness to his arch. He denies performing stretching exercises daily.      Allergies   Allergen Reactions   • Trulicity [Dulaglutide] GI Intolerance   • Gluconic Acid Rash       Current Outpatient Medications on File Prior to Visit   Medication Sig Dispense Refill   • amLODIPine (NORVASC) 10 MG tablet Take 1 tablet by mouth Daily. 90 tablet 3   • atorvastatin (Lipitor) 40 MG tablet Take 1 tablet by mouth Daily. 90 tablet 3   • Dapagliflozin Propanediol (Farxiga) 10 MG tablet Take 10 mg by mouth Daily. 90 tablet 3   • Janumet  MG per tablet Take 1 tablet by mouth 2 (Two) Times a Day. 180 tablet 3   • Lantus SoloStar 100 UNIT/ML injection pen INJECT 70 UNITS SUBCUTANEOUSLY IN THE EVENING 15 mL 3   • lisinopril (PRINIVIL,ZESTRIL) 30 MG tablet Take 30 mg by mouth Daily.     • nateglinide (Starlix) 120 MG tablet Take 1 tablet by mouth 3 (Three) Times a Day Before Meals. 90 tablet 3   • ReliOn Pen Needles 32G X 4 MM misc Use to inject insulin once per day 100 each 3     No current facility-administered medications on file prior to visit.       Objective   Ht 182.9 cm (72\")   Wt (!) 139 kg (306 lb)   SpO2 96%   BMI 41.50 kg/m²     Foot/Ankle Exam:       General:   Appearance: appears stated age and healthy    Orientation: AAOx3    Affect: appropriate      VASCULAR      Left Foot Vascularity   Normal vascular exam  "   Dorsalis pedis:  2+  Posterior tibial:  2+  Skin Temperature: warm    Edema Grading:  None  CFT:  < 3 seconds  Pedal Hair Growth:  Present  Varicosities: none       MUSCULOSKELETAL      Left Foot Musculoskeletal   Ecchymosis:  None  Tenderness: none       DERMATOLOGIC     Left Foot Dermatologic   Skin: skin intact    Nails: normal        Left Foot Additional Comments:  Diabetic foot ulcer to left plantar foot measures approximately 0.9 cm x 0.8 cm x 0.2 cm. Wound bed is red, moist, clean. Small serosanguineous drainage periwound with callus to the left plantar foot but otherwise clear, dry and intact. No signs of infection.    01/09/2023  The wound to the plantar aspect of the left mid-foot has a circular wound that measures approximately 1 cm in diameter with granular wound base. No signs of infection or further concerns.    01/25/2023  Wound is stable and granular in appearance. Wound measures less than 1 cm in diameter. No signs of infection.      Assessment & Plan   Diagnoses and all orders for this visit:    1. Chronic ulcer of left foot with fat layer exposed (HCC) (Primary)    2. History of transmetatarsal amputation of left foot (HCC)    3. DM type 2 with diabetic peripheral neuropathy (HCC)      - The patient is a 47-year-old male who presents to the office today for a follow-up regarding his left foot. Discussed with the patient that his wound is stable and granular in appearance. His blood glucose is well controlled at this time and his infection risk not the primary concern at this time. He does appear to have increased gastrocnemii tightness likely secondary to increased activity. Advised the patient to continue in the boot, continue wound care, and begin daily stretching exercises and massage. At this time, he may opt to decrease his activity and return to using the knee scooter to further offload pressure to encourage a quicker healing time. The patient will follow up in 2 to 3 weeks for  re-evaluation. Greater than 20 minutes was spent before, during, and after evaluation for patient care.    No orders of the defined types were placed in this encounter.         Note is dictated utilizing voice recognition software. Unfortunately this leads to occasional typographical errors. I apologize in advance if the situation occurs. If questions occur please do not hesitate to call our office.    Transcribed from ambient dictation for RENETTA Smith DPM by Kita Arias.  01/25/23   11:08 EST    Patient or patient representative verbalized consent to the visit recording.  I have personally performed the services described in this document as transcribed by the above individual, and it is both accurate and complete.

## 2023-01-29 DIAGNOSIS — Z79.4 TYPE 2 DIABETES MELLITUS WITH DIABETIC POLYNEUROPATHY, WITH LONG-TERM CURRENT USE OF INSULIN: ICD-10-CM

## 2023-01-29 DIAGNOSIS — E11.42 TYPE 2 DIABETES MELLITUS WITH DIABETIC POLYNEUROPATHY, WITH LONG-TERM CURRENT USE OF INSULIN: ICD-10-CM

## 2023-01-30 RX ORDER — SITAGLIPTIN AND METFORMIN HYDROCHLORIDE 1000; 50 MG/1; MG/1
TABLET, FILM COATED ORAL
Qty: 60 TABLET | Refills: 2 | Status: SHIPPED | OUTPATIENT
Start: 2023-01-30

## 2023-02-02 DIAGNOSIS — Z79.4 TYPE 2 DIABETES MELLITUS WITH DIABETIC POLYNEUROPATHY, WITH LONG-TERM CURRENT USE OF INSULIN: ICD-10-CM

## 2023-02-02 DIAGNOSIS — E11.42 TYPE 2 DIABETES MELLITUS WITH DIABETIC POLYNEUROPATHY, WITH LONG-TERM CURRENT USE OF INSULIN: ICD-10-CM

## 2023-02-02 RX ORDER — DAPAGLIFLOZIN 10 MG/1
TABLET, FILM COATED ORAL
Qty: 90 TABLET | Refills: 3 | Status: SHIPPED | OUTPATIENT
Start: 2023-02-02

## 2023-02-08 ENCOUNTER — OFFICE VISIT (OUTPATIENT)
Dept: PODIATRY | Facility: CLINIC | Age: 48
End: 2023-02-08
Payer: COMMERCIAL

## 2023-02-08 VITALS — OXYGEN SATURATION: 97 % | HEIGHT: 72 IN | BODY MASS INDEX: 41.45 KG/M2 | WEIGHT: 306 LBS

## 2023-02-08 DIAGNOSIS — E11.42 DM TYPE 2 WITH DIABETIC PERIPHERAL NEUROPATHY: ICD-10-CM

## 2023-02-08 DIAGNOSIS — L97.522 CHRONIC ULCER OF LEFT FOOT WITH FAT LAYER EXPOSED: Primary | ICD-10-CM

## 2023-02-08 DIAGNOSIS — Z89.432 HISTORY OF TRANSMETATARSAL AMPUTATION OF LEFT FOOT: ICD-10-CM

## 2023-02-08 DIAGNOSIS — M21.862 ACQUIRED POSTERIOR EQUINUS, LEFT: ICD-10-CM

## 2023-02-08 PROCEDURE — 99213 OFFICE O/P EST LOW 20 MIN: CPT | Performed by: PODIATRIST

## 2023-02-08 NOTE — PROGRESS NOTES
02/08/2023  Foot and Ankle Surgery - Established Patient/Follow-up  Provider: Dr. Roc Smith DPM  Location: [facility]    Subjective:  Sánchez Spaulding is a 47 y.o. male.     Chief Complaint   Patient presents with   • Left Foot - Follow-up, Wound Check   • Follow-up     ANKITA YOUNG MD 11/30/2022       HPI: The patient is a 47-year-old male who presents to the clinic for a follow-up regarding his left foot.    He reports his wound is not closing up like he would like it to. He notes he has been wearing his boot; however, he did not wear it today. He states he had a spot on his foot that was swollen; however, it is not as bad today. He reports his blood glucose levels have been better since last month. He notes he had an amputation in 01/2019. He reports he has used his knee scooter occasionally; however, it causes him pain. He states he obtained new inserts in 08/2022 at [facility]. He notes at one time he was wearing a heavier duty boot. He states he can reduce his activity as much as he needs to.    Allergies   Allergen Reactions   • Trulicity [Dulaglutide] GI Intolerance   • Gluconic Acid Rash       Current Outpatient Medications on File Prior to Visit   Medication Sig Dispense Refill   • amLODIPine (NORVASC) 10 MG tablet Take 1 tablet by mouth Daily. 90 tablet 3   • atorvastatin (Lipitor) 40 MG tablet Take 1 tablet by mouth Daily. 90 tablet 3   • Farxiga 10 MG tablet Take 1 tablet by mouth once daily 90 tablet 3   • Janumet  MG per tablet Take 1 tablet by mouth twice daily 60 tablet 2   • Lantus SoloStar 100 UNIT/ML injection pen INJECT 70 UNITS SUBCUTANEOUSLY IN THE EVENING 15 mL 3   • lisinopril (PRINIVIL,ZESTRIL) 30 MG tablet Take 30 mg by mouth Daily.     • nateglinide (Starlix) 120 MG tablet Take 1 tablet by mouth 3 (Three) Times a Day Before Meals. 90 tablet 3   • ReliOn Pen Needles 32G X 4 MM misc Use to inject insulin once per day 100 each 3     No current facility-administered  "medications on file prior to visit.       Objective   Ht 182.9 cm (72\")   Wt (!) 139 kg (306 lb)   SpO2 97%   BMI 41.50 kg/m²     Foot/Ankle Exam  General:   Appearance: appears stated age and healthy    Orientation: AAOx3    Affect: appropriate      VASCULAR      Left Foot Vascularity   Normal vascular exam    Dorsalis pedis:  2+  Posterior tibial:  2+  Skin Temperature: warm    Edema Grading:  None  CFT:  < 3 seconds  Pedal Hair Growth:  Present  Varicosities: none       MUSCULOSKELETAL      Left Foot Musculoskeletal   Ecchymosis:  None  Tenderness: none       DERMATOLOGIC     Left Foot Dermatologic   Skin: skin intact    Nails: normal        Left Foot Additional Comments:  Diabetic foot ulcer to left plantar foot measures approximately 0.9 cm x 0.8 cm x 0.2 cm. Wound bed is red, moist, clean. Small serosanguineous drainage periwound with callus to the left plantar foot but otherwise clear, dry and intact. No signs of infection.    01/09/2023  The wound to the plantar aspect of the left mid-foot has a circular wound that measures approximately 1 cm in diameter with granular wound base. No signs of infection or further concerns.    01/25/2023  Wound is stable and granular in appearance. Wound measures less than 1 cm in diameter. No signs of infection.    02/08/2023: Wound is relatively unchanged and measures approximately 1 cm in diameter with granular wound base. Wound extends to subcutaneous tissue with no deeper tunneling or tracking.      Assessment & Plan   Diagnoses and all orders for this visit:    1. Chronic ulcer of left foot with fat layer exposed (HCC) (Primary)    2. History of transmetatarsal amputation of left foot (HCC)    3. DM type 2 with diabetic peripheral neuropathy (HCC)    4. Acquired posterior equinus, left      Patient returns with continued wound involving the plantar aspect of his left foot.  The wound is relatively unchanged but has a granular base and no signs of infection.  I have " discussed the situation with the patient at length.  Patient feels that we performed the gastrocnemius recession on the left lower extremity approximately 5 years ago.  Unfortunately the current EMR system does not go back that far.  Patient does have tightness to the posterior calf muscles which is likely exacerbating his symptoms and wound.  I have explained that we may need to perform repeat gastrocnemius recession versus tendo Achilles lengthening for further improvement to the forefoot if wound does not improve.  I have asked that he decrease his overall activity and start nonweightbearing activities with the knee scooter for continued improvement.  Patient may continue with dressing care as previously instructed.  I have asked that he monitor closely and return in approximately 3 weeks for reevaluation.    Greater than 20 minutes spent before, during, and after evaluation for patient care.      No orders of the defined types were placed in this encounter.         Note is dictated utilizing voice recognition software. Unfortunately this leads to occasional typographical errors. I apologize in advance if the situation occurs. If questions occur please do not hesitate to call our office.    Transcribed from ambient dictation for RENETTA Smith DPM by Sully Trejo.  02/08/23   11:25 EST    Patient or patient representative verbalized consent to the visit recording.  I have personally performed the services described in this document as transcribed by the above individual, and it is both accurate and complete.

## 2023-03-02 ENCOUNTER — OFFICE VISIT (OUTPATIENT)
Dept: PODIATRY | Facility: CLINIC | Age: 48
End: 2023-03-02
Payer: COMMERCIAL

## 2023-03-02 VITALS — BODY MASS INDEX: 41.45 KG/M2 | HEIGHT: 72 IN | HEART RATE: 99 BPM | OXYGEN SATURATION: 94 % | WEIGHT: 306 LBS

## 2023-03-02 DIAGNOSIS — E11.42 DM TYPE 2 WITH DIABETIC PERIPHERAL NEUROPATHY: ICD-10-CM

## 2023-03-02 DIAGNOSIS — L97.522 CHRONIC ULCER OF LEFT FOOT WITH FAT LAYER EXPOSED: ICD-10-CM

## 2023-03-02 DIAGNOSIS — M21.862 ACQUIRED POSTERIOR EQUINUS, LEFT: ICD-10-CM

## 2023-03-02 DIAGNOSIS — Z89.432 HISTORY OF TRANSMETATARSAL AMPUTATION OF LEFT FOOT: ICD-10-CM

## 2023-03-02 DIAGNOSIS — M79.672 LEFT FOOT PAIN: Primary | ICD-10-CM

## 2023-03-02 PROCEDURE — 99213 OFFICE O/P EST LOW 20 MIN: CPT | Performed by: PODIATRIST

## 2023-03-02 RX ORDER — CIPROFLOXACIN 500 MG/1
500 TABLET, FILM COATED ORAL 2 TIMES DAILY
Qty: 20 TABLET | Refills: 0 | Status: SHIPPED | OUTPATIENT
Start: 2023-03-02 | End: 2023-03-12

## 2023-03-02 NOTE — PROGRESS NOTES
"02/08/2023  Foot and Ankle Surgery - Established Patient/Follow-up  Provider: Dr. Roc Smith DPM  Location: [facility]    Subjective:  Sánchez Spaulding is a 47 y.o. male.     Chief Complaint   Patient presents with   • Left Foot - Follow-up, Wound Check   • Follow-up     ANKITA GAVIRIA MD   11/30/2022       HPI:     The patient is a 47-year-old male who returns for follow-up regarding his left foot. He is accompanied by an adult female.    He has been wearing the boot daily. The adult female notes he has inserts in his shoes. The patient states he has been non-weightbearing. The adult female inquires if thick skin around the outside of the patient's left foot will keep healing. He denies being placed on an antibiotic recently. He denies applying Betadine to the wound.        Allergies   Allergen Reactions   • Trulicity [Dulaglutide] GI Intolerance   • Gluconic Acid Rash       Current Outpatient Medications on File Prior to Visit   Medication Sig Dispense Refill   • amLODIPine (NORVASC) 10 MG tablet Take 1 tablet by mouth Daily. 90 tablet 3   • atorvastatin (Lipitor) 40 MG tablet Take 1 tablet by mouth Daily. 90 tablet 3   • Farxiga 10 MG tablet Take 1 tablet by mouth once daily 90 tablet 3   • Janumet  MG per tablet Take 1 tablet by mouth twice daily 60 tablet 2   • Lantus SoloStar 100 UNIT/ML injection pen INJECT 70 UNITS SUBCUTANEOUSLY IN THE EVENING 15 mL 3   • lisinopril (PRINIVIL,ZESTRIL) 30 MG tablet Take 1 tablet by mouth Daily.     • nateglinide (Starlix) 120 MG tablet Take 1 tablet by mouth 3 (Three) Times a Day Before Meals. 90 tablet 3   • ReliOn Pen Needles 32G X 4 MM misc Use to inject insulin once per day 100 each 3     No current facility-administered medications on file prior to visit.       Objective   Pulse 99   Ht 182.9 cm (72\")   Wt (!) 139 kg (306 lb)   SpO2 94%   BMI 41.50 kg/m²     Foot/Ankle Exam    General:   Appearance: appears stated age and healthy    Orientation: " AAOx3    Affect: appropriate      VASCULAR      Left Foot Vascularity   Normal vascular exam    Dorsalis pedis:  2+  Posterior tibial:  2+  Skin Temperature: warm    Edema Grading:  None  CFT:  < 3 seconds  Pedal Hair Growth:  Present  Varicosities: none       MUSCULOSKELETAL      Left Foot Musculoskeletal   Ecchymosis:  None  Tenderness: none       DERMATOLOGIC     Left Foot Dermatologic   Skin: skin intact    Nails: normal        Left Foot Additional Comments:  Diabetic foot ulcer to left plantar foot measures approximately 0.9 cm x 0.8 cm x 0.2 cm. Wound bed is red, moist, clean. Small serosanguineous drainage periwound with callus to the left plantar foot but otherwise clear, dry and intact. No signs of infection.    01/09/2023  The wound to the plantar aspect of the left mid-foot has a circular wound that measures approximately 1 cm in diameter with granular wound base. No signs of infection or further concerns.    01/25/2023  Wound is stable and granular in appearance. Wound measures less than 1 cm in diameter. No signs of infection.    02/08/2023: Wound is relatively unchanged and measures approximately 1 cm in diameter with granular wound base. Wound extends to subcutaneous tissue with no deeper tunneling or tracking.    03/02/2022: Wound remains present and measures approximately 1 cm in diameter with granular wound base. Mild malodor.      Assessment & Plan   Diagnoses and all orders for this visit:    1. Left foot pain (Primary)    2. Chronic ulcer of left foot with fat layer exposed (HCC)    3. History of transmetatarsal amputation of left foot (HCC)    4. DM type 2 with diabetic peripheral neuropathy (HCC)    5. Acquired posterior equinus, left    Other orders  -     ciprofloxacin (Cipro) 500 MG tablet; Take 1 tablet by mouth 2 (Two) Times a Day for 10 days.  Dispense: 20 tablet; Refill: 0      Patient returns for continued issues involving his left foot.  He does have continued ulceration which is  relatively unchanged.  He does have mild malodor but granular wound base to the wound today and maceration.  I have recommended that we proceed with an oral antibiotic.  I did discuss strict nonweightbearing with him for proper offloading and improvement.  We did discuss options of proceeding with tendo Achilles lengthening to help offload the forefoot.  Patient understands that he will require extensive nonweightbearing and decreased overall activity after surgery.  Patient would like to continue with the same and observe at this time.  I have asked that he continue with collagen dressing changes.  He is to call with any additional issues or concerns and I will see him in 2 weeks for reevaluation and further discussion.    Greater than 20 minutes spent before, during, and after evaluation for patient care.      No orders of the defined types were placed in this encounter.         Note is dictated utilizing voice recognition software. Unfortunately this leads to occasional typographical errors. I apologize in advance if the situation occurs. If questions occur please do not hesitate to call our office.    Transcribed from ambient dictation for RENETTA Smith DPM by Sully Trejo.  02/08/23   11:25 EST    Patient or patient representative verbalized consent to the visit recording.  I have personally performed the services described in this document as transcribed by the above individual, and it is both accurate and complete.

## 2023-03-17 DIAGNOSIS — I10 BENIGN HYPERTENSION: ICD-10-CM

## 2023-03-17 RX ORDER — AMLODIPINE BESYLATE 10 MG/1
TABLET ORAL
Qty: 90 TABLET | Refills: 0 | Status: SHIPPED | OUTPATIENT
Start: 2023-03-17

## 2023-04-07 DIAGNOSIS — Z79.4 TYPE 2 DIABETES MELLITUS WITH DIABETIC POLYNEUROPATHY, WITH LONG-TERM CURRENT USE OF INSULIN: ICD-10-CM

## 2023-04-07 DIAGNOSIS — E11.42 TYPE 2 DIABETES MELLITUS WITH DIABETIC POLYNEUROPATHY, WITH LONG-TERM CURRENT USE OF INSULIN: ICD-10-CM

## 2023-04-07 RX ORDER — INSULIN GLARGINE 100 [IU]/ML
INJECTION, SOLUTION SUBCUTANEOUS
Qty: 15 ML | Refills: 3 | Status: SHIPPED | OUTPATIENT
Start: 2023-04-07

## 2023-04-07 RX ORDER — INSULIN GLARGINE 100 [IU]/ML
INJECTION, SOLUTION SUBCUTANEOUS
Qty: 15 ML | Refills: 0 | OUTPATIENT
Start: 2023-04-07

## 2023-04-07 RX ORDER — LISINOPRIL 30 MG/1
TABLET ORAL
Qty: 90 TABLET | Refills: 0 | OUTPATIENT
Start: 2023-04-07

## 2023-04-07 RX ORDER — LISINOPRIL 30 MG/1
30 TABLET ORAL DAILY
Qty: 90 TABLET | Refills: 3 | Status: SHIPPED | OUTPATIENT
Start: 2023-04-07

## 2023-04-07 NOTE — TELEPHONE ENCOUNTER
Caller: [facility] [address] [place] [address] [place] - [address] [facility] - [phone] [facility] [phone] FX    Relationship: Pharmacy       Requested Prescriptions:   Requested Prescriptions     Pending Prescriptions Disp Refills   • Lantus SoloStar 100 UNIT/ML injection pen 15 mL 3   • lisinopril (PRINIVIL,ZESTRIL) 30 MG tablet       Sig: Take 1 tablet by mouth Daily.        Pharmacy where request should be sent: [address] [place] - [address] [facility] - [phone] [facility] [phone] FX     Last office visit with prescribing clinician: 11/30/2022   Last telemedicine visit with prescribing clinician: Visit date not found   Next office visit with prescribing clinician: Visit date not found     Additional details provided by patient: OUT    Does the patient have less than a 3 day supply:  [x] Yes  [] No    Would you like a call back once the refill request has been completed: [] Yes [] No    If the office needs to give you a call back, can they leave a voicemail: [] Yes [] No    Rosie Price   04/07/23 15:03 EDT

## 2023-04-12 ENCOUNTER — TELEPHONE (OUTPATIENT)
Dept: PODIATRY | Facility: CLINIC | Age: 48
End: 2023-04-12

## 2023-04-12 NOTE — TELEPHONE ENCOUNTER
PT CAN BE WORKED IN TOMORROW WITH . PLEASE CALL PT.   HE MAY NEED TO PROCEED TO ER IS SYMPTOMS GET WORSE

## 2023-04-12 NOTE — TELEPHONE ENCOUNTER
Hub staff attempted to follow warm transfer process and was unsuccessful     Caller: Sánchez Spaulding    Relationship to patient: Self    Best call back number: [phone]    Patient is needing: PATIENT HAS A RED, HOT ULCER ON HIS FOOT AND WOULD LIKE TO BE WORKED IN TODAY. PATIENT IS DIABETIC. OFFICE DID NOT ANSWER WT

## 2023-04-13 ENCOUNTER — OFFICE VISIT (OUTPATIENT)
Dept: PODIATRY | Facility: CLINIC | Age: 48
End: 2023-04-13
Payer: COMMERCIAL

## 2023-04-13 VITALS — HEIGHT: 72 IN | OXYGEN SATURATION: 99 % | HEART RATE: 95 BPM | BODY MASS INDEX: 41.45 KG/M2 | WEIGHT: 306 LBS

## 2023-04-13 DIAGNOSIS — M79.672 CHRONIC FOOT PAIN, LEFT: Primary | ICD-10-CM

## 2023-04-13 DIAGNOSIS — M21.862 ACQUIRED POSTERIOR EQUINUS, LEFT: ICD-10-CM

## 2023-04-13 DIAGNOSIS — G89.29 CHRONIC FOOT PAIN, LEFT: Primary | ICD-10-CM

## 2023-04-13 DIAGNOSIS — E11.42 DM TYPE 2 WITH DIABETIC PERIPHERAL NEUROPATHY: ICD-10-CM

## 2023-04-13 DIAGNOSIS — L03.116 CELLULITIS OF FOOT, LEFT: ICD-10-CM

## 2023-04-13 DIAGNOSIS — Z89.432 HISTORY OF TRANSMETATARSAL AMPUTATION OF LEFT FOOT: ICD-10-CM

## 2023-04-13 DIAGNOSIS — L97.522 CHRONIC ULCER OF LEFT FOOT WITH FAT LAYER EXPOSED: ICD-10-CM

## 2023-04-13 PROCEDURE — 87147 CULTURE TYPE IMMUNOLOGIC: CPT | Performed by: PODIATRIST

## 2023-04-13 PROCEDURE — 87205 SMEAR GRAM STAIN: CPT | Performed by: PODIATRIST

## 2023-04-13 PROCEDURE — 99214 OFFICE O/P EST MOD 30 MIN: CPT | Performed by: PODIATRIST

## 2023-04-13 PROCEDURE — 87070 CULTURE OTHR SPECIMN AEROBIC: CPT | Performed by: PODIATRIST

## 2023-04-13 PROCEDURE — 87186 SC STD MICRODIL/AGAR DIL: CPT | Performed by: PODIATRIST

## 2023-04-13 RX ORDER — SODIUM HYPOCHLORITE 2.5 MG/ML
SOLUTION TOPICAL
Qty: 473 ML | Refills: 2 | Status: SHIPPED | OUTPATIENT
Start: 2023-04-13 | End: 2023-04-13

## 2023-04-13 RX ORDER — SODIUM HYPOCHLORITE 2.5 MG/ML
SOLUTION TOPICAL ONCE
Qty: 473 ML | Refills: 1 | Status: SHIPPED | OUTPATIENT
Start: 2023-04-13 | End: 2023-04-13

## 2023-04-13 RX ORDER — CLINDAMYCIN HYDROCHLORIDE 300 MG/1
300 CAPSULE ORAL 4 TIMES DAILY
Qty: 40 CAPSULE | Refills: 0 | Status: SHIPPED | OUTPATIENT
Start: 2023-04-13 | End: 2023-04-23

## 2023-04-13 NOTE — PROGRESS NOTES
04/13/2023  Foot and Ankle Surgery - Established Patient/Follow-up  Provider: Dr. Roc Smith DPM  Location: [facility]    Subjective:  Sánchez Spaulding is a 47 y.o. male.     Chief Complaint   Patient presents with   • Left Foot - Foot Ulcer, Wound Check, Pain     X 1 day       HPI: The patient is a 47-year-old male who presents for right foot wound. He is accompanied by an adult female.    He was last seen in 03/2023. The adult female states the patient had to postpone his appointment on 03/16/2023. The adult female states the patient's wound has not changed remarkably even in the boot. The adult female states the patient's foot is worse when he is in the boot. The adult female states the patient's blood glucose levels have been good. The adult female states the patient has applied Betadine to the wound. The adult female inquires if the patient's x-ray showed no infection in the bone. The adult female states the patient's Charcot boot was black prior to the amputation.    Allergies   Allergen Reactions   • Trulicity [Dulaglutide] GI Intolerance   • Gluconic Acid Rash       Current Outpatient Medications on File Prior to Visit   Medication Sig Dispense Refill   • amLODIPine (NORVASC) 10 MG tablet Take 1 tablet by mouth once daily 90 tablet 0   • atorvastatin (Lipitor) 40 MG tablet Take 1 tablet by mouth Daily. 90 tablet 3   • Farxiga 10 MG tablet Take 1 tablet by mouth once daily 90 tablet 3   • Janumet  MG per tablet Take 1 tablet by mouth twice daily 60 tablet 2   • Lantus SoloStar 100 UNIT/ML injection pen Inject 70 units subcutaneously every evening 15 mL 3   • lisinopril (PRINIVIL,ZESTRIL) 30 MG tablet Take 1 tablet by mouth Daily. 90 tablet 3   • nateglinide (Starlix) 120 MG tablet Take 1 tablet by mouth 3 (Three) Times a Day Before Meals. 90 tablet 3   • ReliOn Pen Needles 32G X 4 MM misc Use to inject insulin once per day 100 each 3     No current facility-administered medications on  "file prior to visit.       Objective   Pulse 95   Ht 182.9 cm (72\")   Wt (!) 139 kg (306 lb)   SpO2 99%   BMI 41.50 kg/m²     Foot/Ankle Exam  General:   Appearance: appears stated age and healthy    Orientation: AAOx3    Affect: appropriate      VASCULAR      Left Foot Vascularity   Normal vascular exam    Dorsalis pedis:  2+  Posterior tibial:  2+  Skin Temperature: warm    Edema Grading:  None  CFT:  < 3 seconds  Pedal Hair Growth:  Present  Varicosities: none       MUSCULOSKELETAL      Left Foot Musculoskeletal   Ecchymosis:  None  Tenderness: none       DERMATOLOGIC     Left Foot Dermatologic   Skin: skin intact    Nails: normal        Left Foot Additional Comments:  Diabetic foot ulcer to left plantar foot measures approximately 0.9 cm x 0.8 cm x 0.2 cm. Wound bed is red, moist, clean. Small serosanguineous drainage periwound with callus to the left plantar foot but otherwise clear, dry and intact. No signs of infection.    01/09/2023  The wound to the plantar aspect of the left mid-foot has a circular wound that measures approximately 1 cm in diameter with granular wound base. No signs of infection or further concerns.    01/25/2023  Wound is stable and granular in appearance. Wound measures less than 1 cm in diameter. No signs of infection.    02/08/2023: Wound is relatively unchanged and measures approximately 1 cm in diameter with granular wound base. Wound extends to subcutaneous tissue with no deeper tunneling or tracking.    03/02/2022: Wound remains present and measures approximately 1 cm in diameter with granular wound base. Mild malodor.    04/13/2023:Wound to the plantar aspect of the foot is relatively unchanged and appears granular. New blister and ulceration involving the plantar lateral aspect of the foot measuring approximately 2.0 x 1.5 cm. Mild fibrinous debris and purulent appearing tissue that extends to subcutaneous tissue. No jerzy malodor. No significant fluctuance, erythema, or " prominent edema involving the foot. No progressive deformity.      Assessment & Plan   Diagnoses and all orders for this visit:    1. Chronic foot pain, left (Primary)  -     XR Foot 3+ View Left  -     Wound Culture - Wound, Foot, Left    2. Cellulitis of foot, left    3. Chronic ulcer of left foot with fat layer exposed  -     Wound Culture - Wound, Foot, Left    4. History of transmetatarsal amputation of left foot    5. DM type 2 with diabetic peripheral neuropathy    6. Acquired posterior equinus, left    Other orders  -     clindamycin (CLEOCIN) 300 MG capsule; Take 1 capsule by mouth 4 (Four) Times a Day for 10 days.  Dispense: 40 capsule; Refill: 0  -     Discontinue: sodium hypochlorite (DAKIN'S) 0.25 % topical solution; apply to left foot wound daily  Dispense: 473 mL; Refill: 2  -     sodium hypochlorite (DAKIN'S) 0.25 % topical solution; Apply  topically to the appropriate area as directed 1 (One) Time for 1 dose. Apply to left foot wound bid  Dispense: 473 mL; Refill: 1    Patient returns for follow-up regarding his right foot wound. I discussed with the patient that the wound has progressed since I last saw him. I discussed with the patient that the new blister and subsequent infection that came with it when it is retained in that environment, all it takes is a little bit to sit on the skin and tries to burrow. I discussed with the patient that the small isolated area is just skin necrosis that is probably down to a deeper level, but I do not think it is in an area where we can not have it go too deep. I discussed with the patient that I would like to prescribe an antibiotic to try to get this improved. I discussed with the patient that we need to do something from an operative perspective to see if we can offload the area. I discussed with the patient that we may need to make an incision on the outside of the foot and shave down a little bit of the bone to flatten it out. I discussed with the patient  that we may need to consider lengthening his Achilles tendon to some degree to see if we can get this up and off. I discussed with the patient that he will be strict non-weightbearing at this time and I would see even right now to try to get the wound improved. I discussed with the patient that if we put him out of the boot and back into a postop shoe, then we will take the offloading weight out so that the wound on the bottom can get worse if we keep up with the same amount of activity. I discussed with the patient that the boot is not going to work. I discussed with the patient that if he can make improvements, we can get him back to just having the wound on the bottom, I will get him a new boot if we need to. I discussed with the patient that the x-rays are only sensitive to something that has been going on for a while.    Greater than 30 minutes spent before coming during coming after evaluation for patient care.      Orders Placed This Encounter   Procedures   • Wound Culture - Wound, Foot, Left     Gram Stain and C&S     Order Specific Question:   Release to patient     Answer:   Routine Release   • XR Foot 3+ View Left     Order Specific Question:   Reason for Exam:     Answer:   left foot ulcer, wound pain, rm 9 wm rule out osteomylitis     Order Specific Question:   Does this patient have a diabetic monitoring/medication delivering device on?     Answer:   No     Order Specific Question:   Release to patient     Answer:   Routine Release          Note is dictated utilizing voice recognition software. Unfortunately this leads to occasional typographical errors. I apologize in advance if the situation occurs. If questions occur please do not hesitate to call our office.    Transcribed from ambient dictation for RENETTA Smith DPM by Sully Trejo.  04/13/23   10:55 EDT    Patient or patient representative verbalized consent to the visit recording.  I have personally performed the services described in this  document as transcribed by the above individual, and it is both accurate and complete.

## 2023-04-22 LAB
BACTERIA SPEC AEROBE CULT: ABNORMAL
BACTERIA SPEC AEROBE CULT: ABNORMAL
GRAM STN SPEC: ABNORMAL

## 2023-04-26 ENCOUNTER — OFFICE VISIT (OUTPATIENT)
Dept: PODIATRY | Facility: CLINIC | Age: 48
End: 2023-04-26
Payer: COMMERCIAL

## 2023-04-26 VITALS — HEIGHT: 72 IN | WEIGHT: 306 LBS | BODY MASS INDEX: 41.45 KG/M2 | RESPIRATION RATE: 20 BRPM

## 2023-04-26 DIAGNOSIS — Z89.432 HISTORY OF TRANSMETATARSAL AMPUTATION OF LEFT FOOT: ICD-10-CM

## 2023-04-26 DIAGNOSIS — E11.42 DM TYPE 2 WITH DIABETIC PERIPHERAL NEUROPATHY: ICD-10-CM

## 2023-04-26 DIAGNOSIS — L97.522 CHRONIC ULCER OF LEFT FOOT WITH FAT LAYER EXPOSED: Primary | ICD-10-CM

## 2023-04-26 DIAGNOSIS — M21.862 ACQUIRED POSTERIOR EQUINUS, LEFT: ICD-10-CM

## 2023-04-26 NOTE — PROGRESS NOTES
"04/26/2023  Foot and Ankle Surgery - Established Patient/Follow-up  Provider: Dr. Roc Smith DPM  Location: [facility]    Subjective:  Sánchez Spaulding is a 47 y.o. male.     Chief Complaint   Patient presents with   • Left Foot - Follow-up, Foot Ulcer, Wound Check   • Follow-up     ANKITA Larose md  11/2022  date unknown         HPI: The patient is a 47-year-old male who presents to the clinic for a follow-up regarding his left foot.    He reports he has been non-weightbearing on his left foot. He states he has been using Dakin's wet to dry dressings. He notes he has approximately 1 week' worth of clindamycin. The patient states he has been trying not to be on his feet much at work. He notes they have ordered a knee scooter; however, it has not arrived yet. He reports he needs to wait until 07/18/2023 to obtain an x-ray.    Allergies   Allergen Reactions   • Trulicity [Dulaglutide] GI Intolerance   • Gluconic Acid Rash       Current Outpatient Medications on File Prior to Visit   Medication Sig Dispense Refill   • amLODIPine (NORVASC) 10 MG tablet Take 1 tablet by mouth once daily 90 tablet 0   • atorvastatin (Lipitor) 40 MG tablet Take 1 tablet by mouth Daily. 90 tablet 3   • Farxiga 10 MG tablet Take 1 tablet by mouth once daily 90 tablet 3   • Janumet  MG per tablet Take 1 tablet by mouth twice daily 60 tablet 2   • Lantus SoloStar 100 UNIT/ML injection pen Inject 70 units subcutaneously every evening 15 mL 3   • lisinopril (PRINIVIL,ZESTRIL) 30 MG tablet Take 1 tablet by mouth Daily. 90 tablet 3   • nateglinide (Starlix) 120 MG tablet Take 1 tablet by mouth 3 (Three) Times a Day Before Meals. 90 tablet 3   • ReliOn Pen Needles 32G X 4 MM misc Use to inject insulin once per day 100 each 3     No current facility-administered medications on file prior to visit.       Objective   Resp 20   Ht 182.9 cm (72\")   Wt (!) 139 kg (306 lb)   BMI 41.50 kg/m²     Foot/Ankle Exam    General: "   Appearance: appears stated age and healthy    Orientation: AAOx3    Affect: appropriate      VASCULAR      Left Foot Vascularity   Normal vascular exam    Dorsalis pedis:  2+  Posterior tibial:  2+  Skin Temperature: warm    Edema Grading:  None  CFT:  < 3 seconds  Pedal Hair Growth:  Present  Varicosities: none       MUSCULOSKELETAL      Left Foot Musculoskeletal   Ecchymosis:  None  Tenderness: none       DERMATOLOGIC     Left Foot Dermatologic   Skin: skin intact    Nails: normal        Left Foot Additional Comments:  Diabetic foot ulcer to left plantar foot measures approximately 0.9 cm x 0.8 cm x 0.2 cm. Wound bed is red, moist, clean. Small serosanguineous drainage periwound with callus to the left plantar foot but otherwise clear, dry and intact. No signs of infection.    01/09/2023  The wound to the plantar aspect of the left mid-foot has a circular wound that measures approximately 1 cm in diameter with granular wound base. No signs of infection or further concerns.    01/25/2023  Wound is stable and granular in appearance. Wound measures less than 1 cm in diameter. No signs of infection.    02/08/2023: Wound is relatively unchanged and measures approximately 1 cm in diameter with granular wound base. Wound extends to subcutaneous tissue with no deeper tunneling or tracking.    03/02/2022: Wound remains present and measures approximately 1 cm in diameter with granular wound base. Mild malodor.    04/13/2023:Wound to the plantar aspect of the foot is relatively unchanged and appears granular. New blister and ulceration involving the plantar lateral aspect of the foot measuring approximately 2.0 x 1.5 cm. Mild fibrinous debris and purulent appearing tissue that extends to subcutaneous tissue. No jerzy malodor. No significant fluctuance, erythema, or prominent edema involving the foot. No progressive deformity.    04/26/2023: Wound appears improved to the plantar and lateral aspects of the foot with  improving skin island between the two. Granular wound base with mild serous drainage. Mild swelling, but no erythema or calor noted to the foot. No tunneling or tracking concerning purulence.    Assessment & Plan   Diagnoses and all orders for this visit:    1. Chronic ulcer of left foot with fat layer exposed (Primary)    2. History of transmetatarsal amputation of left foot    3. DM type 2 with diabetic peripheral neuropathy    4. Acquired posterior equinus, left      Patient returns for follow-up regarding his left foot. I discussed with the patient that the wound appears improved to the plantar and lateral aspects of the foot with improving skin island between the 2 and granular wound base with mild serous drainage. I discussed with the patient that the wound is part of the healing process. I discussed with the patient that we can give the wound more time to heal. I discussed with the patient that the goal is to keep it healed up because we know that this came on because of increased activity. I discussed with the patient that the risk is still there that this comes back or does not heal. I discussed with the patient that I just need to know where we are at from a standpoint of what we want to do. From the standpoint of process with the Achilles tendon lengthening would be potentially considerable. I discussed with the patient that there is a little weakness to the leg and we have to make sure we do not end up with a rupture and that is going to require some significant off weightbearing and decreased activity. I discussed with the patient that he needs to be at least 6 weeks off weightbearing due to the amount of stress that we put in that area. I discussed with the patient that I need to get a lot of scar tissue formed in that area, so it is stable. After that, I can work with him to some extent with maybe a boot or a knee scooter. I discussed with the patient that the wound on the outside is a little  concerning due to the fat and scar tissue on the bottom of his foot. I discussed with the patient that we do not want any problems on the outside of the foot because that complicates any options that we have. I discussed with the patient that I need to call sooner if he notices any tunneling or draining. I discussed with the patient to continue with the Dakin's wet to dry dressing change and get the knee scooter in place and stay off of it. I discussed with the patient that the offloading is going to take the pressure aspect out of play and that the Dakin's is going to keep the wound nice and clean. I discussed with the patient that if we can get the healing potential to help up with it, I do not think we need to add anything to the mix because this is not extensive wound care. I discussed with the patient that we need to take the pressure off to see if we can get this well healed. I discussed with the patient that I want him to stay off of it like he has been. I discussed with the patient that I want him to finish his antibiotic. I will get an x-ray at his next visit to ensure nothing is changing in that area on the outside.    Greater than 20 minutes spent before, during, and after evaluation for patient care.    No orders of the defined types were placed in this encounter.         Note is dictated utilizing voice recognition software. Unfortunately this leads to occasional typographical errors. I apologize in advance if the situation occurs. If questions occur please do not hesitate to call our office.    Transcribed from ambient dictation for RENETTA Smith DPM by Sully Trejo.  04/26/23   11:11 EDT    Patient or patient representative verbalized consent to the visit recording.  I have personally performed the services described in this document as transcribed by the above individual, and it is both accurate and complete.

## 2023-05-08 DIAGNOSIS — E11.42 TYPE 2 DIABETES MELLITUS WITH DIABETIC POLYNEUROPATHY, WITH LONG-TERM CURRENT USE OF INSULIN: ICD-10-CM

## 2023-05-08 DIAGNOSIS — Z79.4 TYPE 2 DIABETES MELLITUS WITH DIABETIC POLYNEUROPATHY, WITH LONG-TERM CURRENT USE OF INSULIN: ICD-10-CM

## 2023-05-08 RX ORDER — SITAGLIPTIN AND METFORMIN HYDROCHLORIDE 1000; 50 MG/1; MG/1
TABLET, FILM COATED ORAL
Qty: 180 TABLET | Refills: 1 | Status: SHIPPED | OUTPATIENT
Start: 2023-05-08

## 2023-05-10 ENCOUNTER — OFFICE VISIT (OUTPATIENT)
Dept: PODIATRY | Facility: CLINIC | Age: 48
End: 2023-05-10
Payer: COMMERCIAL

## 2023-05-10 VITALS — WEIGHT: 306 LBS | HEIGHT: 72 IN | RESPIRATION RATE: 20 BRPM | BODY MASS INDEX: 41.45 KG/M2

## 2023-05-10 DIAGNOSIS — Z89.432 HISTORY OF TRANSMETATARSAL AMPUTATION OF LEFT FOOT: ICD-10-CM

## 2023-05-10 DIAGNOSIS — E11.42 DM TYPE 2 WITH DIABETIC PERIPHERAL NEUROPATHY: ICD-10-CM

## 2023-05-10 DIAGNOSIS — M21.862 ACQUIRED POSTERIOR EQUINUS, LEFT: ICD-10-CM

## 2023-05-10 DIAGNOSIS — L97.522 CHRONIC ULCER OF LEFT FOOT WITH FAT LAYER EXPOSED: Primary | ICD-10-CM

## 2023-05-10 NOTE — PROGRESS NOTES
"05/10/2023  Foot and Ankle Surgery - Established Patient/Follow-up  Provider: Dr. Roc Smith DPM  Location: [facility]    Subjective:  Sánchez Spaulding is a 47 y.o. male.     Chief Complaint   Patient presents with   • Left Foot - Skin Ulcer, Wound Check, Diabetes, Peripheral Neuropathy   • Right Foot - Diabetes, Peripheral Neuropathy   • Follow-up     DIEGO Larose MD 11/30/2022       HPI: The patient is a 47-year-old male who presents to the office today for a follow-up regarding his left foot.    The patient's wife believes his wound is improving. He reports mild drainage from his wound. He has decreased his ambulation. The patient uses a scooter at work. The patient ambulates less than 1 hour per day. He weighs himself approximately once weekly.    Allergies   Allergen Reactions   • Trulicity [Dulaglutide] GI Intolerance   • Gluconic Acid Rash       Current Outpatient Medications on File Prior to Visit   Medication Sig Dispense Refill   • amLODIPine (NORVASC) 10 MG tablet Take 1 tablet by mouth once daily 90 tablet 0   • atorvastatin (Lipitor) 40 MG tablet Take 1 tablet by mouth Daily. 90 tablet 3   • Farxiga 10 MG tablet Take 1 tablet by mouth once daily 90 tablet 3   • Janumet  MG per tablet Take 1 tablet by mouth twice daily 180 tablet 1   • Lantus SoloStar 100 UNIT/ML injection pen Inject 70 units subcutaneously every evening 15 mL 3   • lisinopril (PRINIVIL,ZESTRIL) 30 MG tablet Take 1 tablet by mouth Daily. 90 tablet 3   • nateglinide (Starlix) 120 MG tablet Take 1 tablet by mouth 3 (Three) Times a Day Before Meals. 90 tablet 3   • ReliOn Pen Needles 32G X 4 MM misc Use to inject insulin once per day 100 each 3     No current facility-administered medications on file prior to visit.       Objective   Resp 20   Ht 182.9 cm (72\")   Wt (!) 139 kg (306 lb)   BMI 41.50 kg/m²     Foot/Ankle Exam     General:   Appearance: appears stated age and healthy    Orientation: AAOx3    Affect: " appropriate      VASCULAR      Left Foot Vascularity   Normal vascular exam    Dorsalis pedis:  2+  Posterior tibial:  2+  Skin Temperature: warm    Edema Grading:  None  CFT:  < 3 seconds  Pedal Hair Growth:  Present  Varicosities: none       MUSCULOSKELETAL      Left Foot Musculoskeletal   Ecchymosis:  None  Tenderness: none       DERMATOLOGIC     Left Foot Dermatologic   Skin: skin intact    Nails: normal        Left Foot Additional Comments:  Diabetic foot ulcer to left plantar foot measures approximately 0.9 cm x 0.8 cm x 0.2 cm. Wound bed is red, moist, clean. Small serosanguineous drainage periwound with callus to the left plantar foot but otherwise clear, dry and intact. No signs of infection.    01/09/2023  The wound to the plantar aspect of the left mid-foot has a circular wound that measures approximately 1 cm in diameter with granular wound base. No signs of infection or further concerns.    01/25/2023  Wound is stable and granular in appearance. Wound measures less than 1 cm in diameter. No signs of infection.    02/08/2023: Wound is relatively unchanged and measures approximately 1 cm in diameter with granular wound base. Wound extends to subcutaneous tissue with no deeper tunneling or tracking.    03/02/2022: Wound remains present and measures approximately 1 cm in diameter with granular wound base. Mild malodor.    04/13/2023:Wound to the plantar aspect of the foot is relatively unchanged and appears granular. New blister and ulceration involving the plantar lateral aspect of the foot measuring approximately 2.0 x 1.5 cm. Mild fibrinous debris and purulent appearing tissue that extends to subcutaneous tissue. No jerzy malodor. No significant fluctuance, erythema, or prominent edema involving the foot. No progressive deformity.    04/26/2023: Wound appears improved to the plantar and lateral aspects of the foot with improving skin island between the two. Granular wound base with mild serous drainage.  Mild swelling, but no erythema or calor noted to the foot. No tunneling or tracking concerning purulence.    05/10/2023: Wounds are relatively stable and granular in appearance. Mild serous drainage with swelling involving the left lower extremity. No erythema, purulence, malodor, or surrounding fluctuance. No other open wounds.    Assessment & Plan   Diagnoses and all orders for this visit:    1. Chronic ulcer of left foot with fat layer exposed (Primary)  -     XR Foot 3+ View Left    2. History of transmetatarsal amputation of left foot    3. DM type 2 with diabetic peripheral neuropathy    4. Acquired posterior equinus, left    Patient returns with continued wound involving the plantar lateral aspect of his left foot.  He has remained mostly off weightbearing as instructed.  He continues to have moderate swelling and a relatively large size wound to this region with only serous drainage.  No jerzy signs of infection or tunneling are noted.  Imaging was reviewed showing no significant changes involving the underlying bone concerning for acute osseous process.  I have recommended that he wear a postop shoe which was dispensed today as I do feel that a closed toed shoe is likely adding too much friction to this area.  I have also asked that he start compression therapy with his daily Betadine dressing changes.  He is to monitor closely and call with any progressive issues and I will see him in 2 weeks for reevaluation.    Greater than 20 minutes was spent before, during, and after evaluation for patient care    Orders Placed This Encounter   Procedures   • XR Foot 3+ View Left     Order Specific Question:   Reason for Exam:     Answer:   LEFT FOOT WOUND LATERAL SIDE OF FOOT    r/o osteomyelitis  RM 10 WB     Order Specific Question:   Does this patient have a diabetic monitoring/medication delivering device on?     Answer:   No     Order Specific Question:   Release to patient     Answer:   Routine Release           Note is dictated utilizing voice recognition software. Unfortunately this leads to occasional typographical errors. I apologize in advance if the situation occurs. If questions occur please do not hesitate to call our office.    Transcribed from ambient dictation for RENETTA Smith DPM by Judd Downey.  05/10/23   10:53 EDT    Patient or patient representative verbalized consent to the visit recording.  I have personally performed the services described in this document as transcribed by the above individual, and it is both accurate and complete.

## 2023-05-17 DIAGNOSIS — E78.49 OTHER HYPERLIPIDEMIA: ICD-10-CM

## 2023-05-18 RX ORDER — ATORVASTATIN CALCIUM 40 MG/1
TABLET, FILM COATED ORAL
Qty: 90 TABLET | Refills: 3 | Status: SHIPPED | OUTPATIENT
Start: 2023-05-18

## 2023-05-31 ENCOUNTER — OFFICE VISIT (OUTPATIENT)
Dept: PODIATRY | Facility: CLINIC | Age: 48
End: 2023-05-31

## 2023-05-31 VITALS — RESPIRATION RATE: 20 BRPM | BODY MASS INDEX: 41.45 KG/M2 | HEIGHT: 72 IN | WEIGHT: 306 LBS

## 2023-05-31 DIAGNOSIS — M21.862 ACQUIRED POSTERIOR EQUINUS, LEFT: ICD-10-CM

## 2023-05-31 DIAGNOSIS — L97.522 CHRONIC ULCER OF LEFT FOOT WITH FAT LAYER EXPOSED: Primary | ICD-10-CM

## 2023-05-31 DIAGNOSIS — Z89.432 HISTORY OF TRANSMETATARSAL AMPUTATION OF LEFT FOOT: ICD-10-CM

## 2023-05-31 DIAGNOSIS — E11.42 DM TYPE 2 WITH DIABETIC PERIPHERAL NEUROPATHY: ICD-10-CM

## 2023-05-31 PROCEDURE — 87186 SC STD MICRODIL/AGAR DIL: CPT | Performed by: PODIATRIST

## 2023-05-31 PROCEDURE — 87070 CULTURE OTHR SPECIMN AEROBIC: CPT | Performed by: PODIATRIST

## 2023-05-31 PROCEDURE — 87205 SMEAR GRAM STAIN: CPT | Performed by: PODIATRIST

## 2023-05-31 RX ORDER — SULFAMETHOXAZOLE AND TRIMETHOPRIM 800; 160 MG/1; MG/1
1 TABLET ORAL 2 TIMES DAILY
Qty: 20 TABLET | Refills: 0 | Status: SHIPPED | OUTPATIENT
Start: 2023-05-31 | End: 2023-06-10

## 2023-05-31 NOTE — PROGRESS NOTES
05/31/2023  Foot and Ankle Surgery - Established Patient/Follow-up  Provider: Dr. Roc Smith DPM  Location: [facility]    Subjective:  Sánchez Spaulding is a 47 y.o. male.     Chief Complaint   Patient presents with   • Left Foot - Follow-up, Wound Check   • Follow-up     ANKITA Larose md   11/30/2022       The patient is a 47-year-old male who presents to the office today for a follow-up regarding his left foot wound. He is accompanied by an adult female today.    The patient states he is attempting to hold off on surgery until after 07/20/2023, as he will have time to be off work at that time. He notes the adult female is monitoring his wound daily and inquires if he should continue applying Daikin's. He recalls he was not prescribed antibiotics previously. The patient confirms continuing to use his knee scooter to stay non-weightbearing. He denies obtaining an x-ray of his left foot today. He reports he has not undergone any laboratory studies with his primary care physician since 11/2022, but he denies any issues with his kidneys.     Allergies   Allergen Reactions   • Trulicity [Dulaglutide] GI Intolerance   • Gluconic Acid Rash       Current Outpatient Medications on File Prior to Visit   Medication Sig Dispense Refill   • amLODIPine (NORVASC) 10 MG tablet Take 1 tablet by mouth once daily 90 tablet 0   • atorvastatin (LIPITOR) 40 MG tablet Take 1 tablet by mouth once daily 90 tablet 3   • Farxiga 10 MG tablet Take 1 tablet by mouth once daily 90 tablet 3   • Janumet  MG per tablet Take 1 tablet by mouth twice daily 180 tablet 1   • Lantus SoloStar 100 UNIT/ML injection pen Inject 70 units subcutaneously every evening 15 mL 3   • lisinopril (PRINIVIL,ZESTRIL) 30 MG tablet Take 1 tablet by mouth Daily. 90 tablet 3   • nateglinide (Starlix) 120 MG tablet Take 1 tablet by mouth 3 (Three) Times a Day Before Meals. 90 tablet 3   • ReliOn Pen Needles 32G X 4 MM misc Use to inject insulin once per  "day 100 each 3     No current facility-administered medications on file prior to visit.       Objective   Resp 20   Ht 182.9 cm (72\")   Wt (!) 139 kg (306 lb)   BMI 41.50 kg/m²     Foot/Ankle Exam   General:   Appearance: appears stated age and healthy    Orientation: AAOx3    Affect: appropriate       VASCULAR       Left Foot Vascularity   Normal vascular exam    Dorsalis pedis:  2+  Posterior tibial:  2+  Skin Temperature: warm    Edema Grading:  None  CFT:  < 3 seconds  Pedal Hair Growth:  Present  Varicosities: none        MUSCULOSKELETAL       Left Foot Musculoskeletal   Ecchymosis:  None  Tenderness: none        DERMATOLOGIC      Left Foot Dermatologic   Skin: skin intact    Nails: normal        Left Foot Additional Comments:  Diabetic foot ulcer to left plantar foot measures approximately 0.9 cm x 0.8 cm x 0.2 cm. Wound bed is red, moist, clean. Small serosanguineous drainage periwound with callus to the left plantar foot but otherwise clear, dry and intact. No signs of infection.     01/09/2023  The wound to the plantar aspect of the left mid-foot has a circular wound that measures approximately 1 cm in diameter with granular wound base. No signs of infection or further concerns.     01/25/2023  Wound is stable and granular in appearance. Wound measures less than 1 cm in diameter. No signs of infection.     02/08/2023: Wound is relatively unchanged and measures approximately 1 cm in diameter with granular wound base. Wound extends to subcutaneous tissue with no deeper tunneling or tracking.     03/02/2022: Wound remains present and measures approximately 1 cm in diameter with granular wound base. Mild malodor.     04/13/2023:Wound to the plantar aspect of the foot is relatively unchanged and appears granular. New blister and ulceration involving the plantar lateral aspect of the foot measuring approximately 2.0 x 1.5 cm. Mild fibrinous debris and purulent appearing tissue that extends to subcutaneous " tissue. No jerzy malodor. No significant fluctuance, erythema, or prominent edema involving the foot. No progressive deformity.     04/26/2023: Wound appears improved to the plantar and lateral aspects of the foot with improving skin island between the two. Granular wound base with mild serous drainage. Mild swelling, but no erythema or calor noted to the foot. No tunneling or tracking concerning purulence.     05/10/2023: Wounds are relatively stable and granular in appearance. Mild serous drainage with swelling involving the left lower extremity. No erythema, purulence, malodor, or surrounding fluctuance. No other open wounds.    05/31/2023  Wound has increased in size mildly and now measures 2.5 cm in diameter. Wound does have mildly fibrotic wound base. Mild periwound erythema. No fluctuance or jerzy purulence. No malodor.    Assessment & Plan   Diagnoses and all orders for this visit:    1. Chronic ulcer of left foot with fat layer exposed (Primary)  -     Wound Culture - Wound, Foot, Left  -     MRI Foot Left With & Without Contrast; Future    2. History of transmetatarsal amputation of left foot  -     Wound Culture - Wound, Foot, Left    3. DM type 2 with diabetic peripheral neuropathy    4. Acquired posterior equinus, left    Other orders  -     sulfamethoxazole-trimethoprim (Bactrim DS) 800-160 MG per tablet; Take 1 tablet by mouth 2 (Two) Times a Day for 10 days.  Dispense: 20 tablet; Refill: 0      The patient returns to the office today for a follow-up regarding his left foot wound. X-rays of the left foot, obtained on 05/10/2023, were reviewed today and compared to x-rays of the left foot obtained in 04/2023. Imaging revealed no significant changes. A wound culture obtained on 05/13/2023 was reviewed and revealed Staphylococcus aureus resistant to clindamycin. The wound appears regressed with persistent erythema today compared to his last visit. Discussed concerns of a subacute infection. Recommended  an MRI scan with contrast with surgical intervention to follow. Advised against holding off on surgery until 07/2023. Surgical procedure, risks, goals, and recovery were discussed in detail with the patient. Prescribed a course of Augmentin today and obtained a wound culture. He will discontinue applying Daikin's, but continue with the Betadine application. Advised the patient to continue to keep the wound as clean as possible. The patient will return to the office in 2 weeks after the MRI is completed and further treatment will be discussed at that time. Advised patient to call if he notes any changes. Greater than 30 minutes was spent before, during, and after evaluation for patient care.    Orders Placed This Encounter   Procedures   • Wound Culture - Wound, Foot, Left     Gram Stain and C&S     Order Specific Question:   Release to patient     Answer:   Routine Release   • MRI Foot Left With & Without Contrast     Standing Status:   Future     Standing Expiration Date:   5/31/2024          Note is dictated utilizing voice recognition software. Unfortunately this leads to occasional typographical errors. I apologize in advance if the situation occurs. If questions occur please do not hesitate to call our office.    Transcribed from ambient dictation for RENETTA Smith DPM by Kita Arias.  05/31/23   09:56 EDT    Patient or patient representative verbalized consent to the visit recording.  I have personally performed the services described in this document as transcribed by the above individual, and it is both accurate and complete.

## 2023-06-06 LAB
BACTERIA SPEC AEROBE CULT: ABNORMAL
GRAM STN SPEC: ABNORMAL
GRAM STN SPEC: ABNORMAL

## 2023-07-05 NOTE — H&P (VIEW-ONLY)
"07/05/2023  Foot and Ankle Surgery - Established Patient/Follow-up  Provider: Dr. Roc Smith DPM  Location: [facility]    Subjective:  Sánchez Spaulding is a 47 y.o. male.     Chief Complaint   Patient presents with    Left Foot - Foot Ulcer, Diabetes    Follow-up     DIEGO Larose MD 11/2022       The patient is a 47-year-old male who returns to the office today for a follow-up regarding his left foot. He is accompanied by an adult female.    The patient states he obtained an MRI scan of his left foot at [facility]. He reports he underwent a release procedure approximately 4 to 5 years ago, and he is attempting to avoid the surgery until 07/26/2023, secondary to his career. The patient inquires how long he will be non-weightbearing. The adult female inquires if wound care will be required after surgical intervention.    Allergies   Allergen Reactions    Trulicity [Dulaglutide] GI Intolerance    Gluconic Acid Rash       Current Outpatient Medications on File Prior to Visit   Medication Sig Dispense Refill    amLODIPine (NORVASC) 10 MG tablet Take 1 tablet by mouth once daily 90 tablet 0    atorvastatin (LIPITOR) 40 MG tablet Take 1 tablet by mouth once daily 90 tablet 3    Farxiga 10 MG tablet Take 1 tablet by mouth once daily 90 tablet 3    Janumet  MG per tablet Take 1 tablet by mouth twice daily 180 tablet 1    lisinopril (PRINIVIL,ZESTRIL) 30 MG tablet Take 1 tablet by mouth Daily. 90 tablet 3    nateglinide (Starlix) 120 MG tablet Take 1 tablet by mouth 3 (Three) Times a Day Before Meals. 90 tablet 3    ReliOn Pen Needles 32G X 4 MM misc Use to inject insulin once per day 100 each 3    Lantus SoloStar 100 UNIT/ML injection pen INJECT 70 UNITS SUBCUTANEOUSLY IN THE EVENING 15 mL 3     No current facility-administered medications on file prior to visit.       Objective   Pulse 75   Ht 182.9 cm (72\")   Wt (!) 139 kg (306 lb)   SpO2 96%   BMI 41.50 kg/mý     Foot/Ankle Exam "   General:   Appearance: appears stated age and healthy ÿ  Orientation: AAOx3 ÿ  Affect: appropriate ÿ  ÿ  VASCULARÿ  ÿ  ÿLeft Foot Vascularity   Normal vascular exam ÿ  Dorsalis pedis: ÿ2+  Posterior tibial: ÿ2+  Skin Temperature: warm ÿ  Edema Grading: ÿNone  CFT: ÿ<ÿ3 seconds  Pedal Hair Growth: ÿPresent  Varicosities: none ÿ  ÿ  ÿMUSCULOSKELETALÿ  ÿ  ÿLeft Foot Musculoskeletal   Ecchymosis: ÿNone  Tenderness: none ÿ  ÿ  ÿDERMATOLOGIC  ÿ  ÿLeft Foot Dermatologic   Skin: skin intact ÿ  Nails: normal ÿ  ÿ  ÿLeft Foot Additional Comments: ÿDiabetic foot ulcer to left plantar foot measures approximately 0.9 cm x 0.8 cm x 0.2 cm. Wound bed is red, moist, clean. Small serosanguineous drainage periwound with callus to the left plantar foot but otherwise clear, dry and intact. No signs of infection.  ÿ  01/09/2023  The wound to the plantar aspect of the left mid-foot has a circular wound that measures approximately 1 cm in diameter with granular wound base. No signs of infection or further concerns.  ÿ  01/25/2023  Wound is stable and granular in appearance. Wound measures less than 1 cm in diameter. No signs of infection.  ÿ  02/08/2023: Wound is relatively unchanged and measures approximately 1 cm in diameter with granular wound base. Wound extends to subcutaneous tissue with no deeper tunneling or tracking.  ÿ  03/02/2022: Wound remains present and measures approximately 1 cm in diameter with granular wound base. Mild malodor.  ÿ  04/13/2023:Wound to the plantar aspect of the foot is relatively unchanged and appears granular. New blister and ulceration involving the plantar lateral aspect of the foot measuring approximately 2.0 x 1.5 cm. Mild fibrinous debris and purulent appearing tissue that extends to subcutaneous tissue. No jerzy malodor. No significant fluctuance, erythema, or prominent edema involving the foot. No progressive deformity.  ÿ  04/26/2023: Wound appears improved to the plantar and lateral aspects of  the foot with improving skin island between the two. Granular wound base with mild serous drainage. Mild swelling, but no erythema or calor noted to the foot. No tunneling or tracking concerning purulence.  ÿ  05/10/2023:ÿWounds are relatively stable and granular in appearance. Mild serous drainage with swelling involving the left lower extremity. No erythema, purulence, malodor, or surrounding fluctuance. No other open wounds.  ÿ  05/31/2023  Wound has increased in size mildly and now measures 2.5 cm in diameter. Wound does have mildly fibrotic wound base. Mild periwound erythema. No fluctuance or jerzy purulence. No malodor.  ÿ  06/20/2023  Wound is granular today with mild serous drainage. No periwound erythema or edema. No gross signs of local infection. No progressive deformity or instability.    07/05/2023  Continued open wound involving the plantar lateral aspect of the left foot with draining wound base and mild serous drainage. No jerzy purulence, periwound erythema or edema. Wound measures approximately 2.5 cm in diameter. Moderate equinus contracture.    Assessment & Plan   Diagnoses and all orders for this visit:    1. Chronic ulcer of left foot with fat layer exposed (Primary)  -     ECG 12 Lead; Future  -     XR Chest 2 View; Future  -     Case Request; Standing  -     CBC (No Diff); Future  -     Basic Metabolic Panel; Future  -     vancomycin (VANCOCIN) 2,000 mg in sodium chloride 0.9 % 250 mL IVPB  -     Case Request    2. Acute osteomyelitis of left foot  -     ECG 12 Lead; Future  -     XR Chest 2 View; Future  -     Case Request; Standing  -     CBC (No Diff); Future  -     Basic Metabolic Panel; Future  -     vancomycin (VANCOCIN) 2,000 mg in sodium chloride 0.9 % 250 mL IVPB  -     Case Request    3. History of transmetatarsal amputation of left foot    4. DM type 2 with diabetic peripheral neuropathy  -     ECG 12 Lead; Future  -     XR Chest 2 View; Future  -     Case Request; Standing  -      CBC (No Diff); Future  -     Basic Metabolic Panel; Future  -     vancomycin (VANCOCIN) 2,000 mg in sodium chloride 0.9 % 250 mL IVPB  -     Case Request    5. Acquired posterior equinus, left  -     ECG 12 Lead; Future  -     XR Chest 2 View; Future  -     Case Request; Standing  -     CBC (No Diff); Future  -     Basic Metabolic Panel; Future  -     vancomycin (VANCOCIN) 2,000 mg in sodium chloride 0.9 % 250 mL IVPB  -     Case Request    Other orders  -     Follow Anesthesia Guidelines / Protocol; Future  -     Obtain Informed Consent; Future  -     Provide NPO Instructions to Patient; Future  -     Chlorhexidine Skin Prep; Future  -     Follow Anesthesia Guidelines / Protocol; Standing  -     Verify / Perform Chlorhexidine Skin Prep; Standing  -     Verify / Perform Chlorhexidine Skin Prep if Indicated (If Not Already Completed); Standing        The patient returns for follow-up regarding his left foot. MRI scan of the left foot, obtained at [facility], was reviewed with the patient today along with previous x-rays. Imaging, diagnosis, and treatment options were discussed at length with the patient today. His left foot appears stable at this time; however, he continues to have an open wound with drainage without jerzy purulence, periwound erythema, or edema.  The MRI does show signs of marrow edema involving the residual fifth metatarsal which is highly consistent with osteomyelitis. Discussed surgical intervention of fifth metatarsal resection and tendo Achilles lengthening.  Surgical procedure, risks, goals, and recovery were discussed in detail with the patient. Advised bracing will likely be necessary to maintain proper orientation status post surgery. Advised he will likely be non-weightbearing status post surgery. All questions were answered. Advised it is likely okay to postpone surgery until after 07/26/2023, unless there are progressive symptoms. Our surgery scheduler will contact the patient to  schedule surgery. Greater than 30 minutes was spent before, during, and after evaluation for patient care.      Orders Placed This Encounter   Procedures    XR Chest 2 View     Standing Status:   Future     Standing Expiration Date:   7/6/2024     Order Specific Question:   Reason for Exam:     Answer:   Preop    CBC (No Diff)     Standing Status:   Future     Standing Expiration Date:   7/6/2024     Order Specific Question:   Release to patient     Answer:   Routine Release    Basic Metabolic Panel     Standing Status:   Future     Standing Expiration Date:   7/6/2024     Order Specific Question:   Release to patient     Answer:   Routine Release    Obtain Informed Consent     Standing Status:   Future     Order Specific Question:   Informed Consent Given For     Answer:   Fifth metatarsal removal and excisional wound debridement to left foot wound with tendo Achilles lengthening    Provide NPO Instructions to Patient     Standing Status:   Future    Chlorhexidine Skin Prep     Chlorhexidine Skin Prep and Instructions For All Patients Having A Procedure Requiring an Outward Incision if Not Allergic. If Allergic, Give Antibacterial Skin Wipes and Instructions. Do Not Use For Facial Cases or on Any Mucus Membranes.     Standing Status:   Future    ECG 12 Lead     Standing Status:   Future     Standing Expiration Date:   7/6/2024     Order Specific Question:   Reason for Exam:     Answer:   Preop          Note is dictated utilizing voice recognition software. Unfortunately this leads to occasional typographical errors. I apologize in advance if the situation occurs. If questions occur please do not hesitate to call our office.    Transcribed from ambient dictation for RENETTA Smith DPM by Kita Arias.  07/05/23   11:51 EDT    Patient or patient representative verbalized consent to the visit recording.  I have personally performed the services described in this document as transcribed by the above  individual, and it is both accurate and complete.

## 2023-07-12 PROBLEM — M86.172 ACUTE OSTEOMYELITIS OF LEFT FOOT: Status: ACTIVE | Noted: 2023-07-12

## 2023-07-12 PROBLEM — M21.862 ACQUIRED POSTERIOR EQUINUS, LEFT: Status: ACTIVE | Noted: 2023-07-12

## 2023-07-12 PROBLEM — L97.522 CHRONIC ULCER OF LEFT FOOT WITH FAT LAYER EXPOSED: Status: ACTIVE | Noted: 2023-07-12

## 2023-07-26 ENCOUNTER — OFFICE VISIT (OUTPATIENT)
Dept: FAMILY MEDICINE CLINIC | Facility: CLINIC | Age: 48
End: 2023-07-26
Payer: COMMERCIAL

## 2023-07-26 VITALS
OXYGEN SATURATION: 95 % | WEIGHT: 309 LBS | DIASTOLIC BLOOD PRESSURE: 81 MMHG | SYSTOLIC BLOOD PRESSURE: 132 MMHG | BODY MASS INDEX: 41.85 KG/M2 | HEIGHT: 72 IN | HEART RATE: 90 BPM | TEMPERATURE: 97 F

## 2023-07-26 DIAGNOSIS — E78.49 OTHER HYPERLIPIDEMIA: ICD-10-CM

## 2023-07-26 DIAGNOSIS — E11.42 TYPE 2 DIABETES MELLITUS WITH DIABETIC POLYNEUROPATHY, WITH LONG-TERM CURRENT USE OF INSULIN: ICD-10-CM

## 2023-07-26 DIAGNOSIS — I10 WHITE COAT SYNDROME WITH DIAGNOSIS OF HYPERTENSION: ICD-10-CM

## 2023-07-26 DIAGNOSIS — E11.29 MICROALBUMINURIA DUE TO TYPE 2 DIABETES MELLITUS: ICD-10-CM

## 2023-07-26 DIAGNOSIS — E11.42 DIABETIC POLYNEUROPATHY ASSOCIATED WITH TYPE 2 DIABETES MELLITUS: ICD-10-CM

## 2023-07-26 DIAGNOSIS — I10 BENIGN HYPERTENSION: Primary | ICD-10-CM

## 2023-07-26 DIAGNOSIS — L97.522 CHRONIC ULCER OF LEFT FOOT WITH FAT LAYER EXPOSED: ICD-10-CM

## 2023-07-26 DIAGNOSIS — R80.9 MICROALBUMINURIA DUE TO TYPE 2 DIABETES MELLITUS: ICD-10-CM

## 2023-07-26 DIAGNOSIS — Z79.4 TYPE 2 DIABETES MELLITUS WITH DIABETIC POLYNEUROPATHY, WITH LONG-TERM CURRENT USE OF INSULIN: ICD-10-CM

## 2023-07-26 PROCEDURE — 99214 OFFICE O/P EST MOD 30 MIN: CPT | Performed by: FAMILY MEDICINE

## 2023-07-26 RX ORDER — AMLODIPINE BESYLATE 10 MG/1
10 TABLET ORAL DAILY
Qty: 90 TABLET | Refills: 3 | Status: SHIPPED | OUTPATIENT
Start: 2023-07-26

## 2023-07-27 LAB
CHOLEST SERPL-MCNC: 152 MG/DL (ref 100–199)
HBA1C MFR BLD: 7.5 % (ref 4.8–5.6)
HDLC SERPL-MCNC: 37 MG/DL
LDLC SERPL CALC-MCNC: 81 MG/DL (ref 0–99)
MICROALBUMIN UR-MCNC: 56 UG/ML
TRIGL SERPL-MCNC: 199 MG/DL (ref 0–149)
VLDLC SERPL CALC-MCNC: 34 MG/DL (ref 5–40)

## 2023-07-31 ENCOUNTER — HOSPITAL ENCOUNTER (OUTPATIENT)
Facility: HOSPITAL | Age: 48
Setting detail: HOSPITAL OUTPATIENT SURGERY
Discharge: HOME | End: 2023-07-31
Attending: PODIATRIST | Admitting: PODIATRIST
Payer: COMMERCIAL

## 2023-07-31 ENCOUNTER — ANESTHESIA (OUTPATIENT)
Dept: PERIOP | Facility: HOSPITAL | Age: 48
End: 2023-07-31
Payer: COMMERCIAL

## 2023-07-31 ENCOUNTER — ANESTHESIA EVENT (OUTPATIENT)
Dept: PERIOP | Facility: HOSPITAL | Age: 48
End: 2023-07-31
Payer: COMMERCIAL

## 2023-07-31 VITALS
HEART RATE: 68 BPM | TEMPERATURE: 97 F | SYSTOLIC BLOOD PRESSURE: 133 MMHG | BODY MASS INDEX: 41.45 KG/M2 | HEIGHT: 72 IN | WEIGHT: 306 LBS | RESPIRATION RATE: 17 BRPM | DIASTOLIC BLOOD PRESSURE: 74 MMHG | OXYGEN SATURATION: 96 %

## 2023-07-31 DIAGNOSIS — M86.172 ACUTE OSTEOMYELITIS OF LEFT FOOT: ICD-10-CM

## 2023-07-31 DIAGNOSIS — L97.522 CHRONIC ULCER OF LEFT FOOT WITH FAT LAYER EXPOSED: ICD-10-CM

## 2023-07-31 DIAGNOSIS — M21.862 ACQUIRED POSTERIOR EQUINUS, LEFT: ICD-10-CM

## 2023-07-31 DIAGNOSIS — E11.42 DM TYPE 2 WITH DIABETIC PERIPHERAL NEUROPATHY: ICD-10-CM

## 2023-07-31 LAB
GLUCOSE BLDC GLUCOMTR-MCNC: 136 MG/DL (ref 70–105)
GLUCOSE BLDC GLUCOMTR-MCNC: 150 MG/DL (ref 70–105)

## 2023-07-31 PROCEDURE — 27605 INCISION OF ACHILLES TENDON: CPT | Performed by: PODIATRIST

## 2023-07-31 PROCEDURE — 25010000002 FENTANYL CITRATE (PF) 100 MCG/2ML SOLUTION: Performed by: NURSE ANESTHETIST, CERTIFIED REGISTERED

## 2023-07-31 PROCEDURE — 25010000002 HYDROMORPHONE 1 MG/ML SOLUTION: Performed by: NURSE ANESTHETIST, CERTIFIED REGISTERED

## 2023-07-31 PROCEDURE — 25010000002 VANCOMYCIN HCL IN NACL 1.75-0.9 GM/500ML-% SOLUTION: Performed by: NURSE ANESTHETIST, CERTIFIED REGISTERED

## 2023-07-31 PROCEDURE — 28122 PARTIAL REMOVAL OF FOOT BONE: CPT | Performed by: PODIATRIST

## 2023-07-31 PROCEDURE — 25010000002 SUGAMMADEX 200 MG/2ML SOLUTION: Performed by: NURSE ANESTHETIST, CERTIFIED REGISTERED

## 2023-07-31 PROCEDURE — 87147 CULTURE TYPE IMMUNOLOGIC: CPT | Performed by: PODIATRIST

## 2023-07-31 PROCEDURE — 82948 REAGENT STRIP/BLOOD GLUCOSE: CPT

## 2023-07-31 PROCEDURE — 25010000002 VANCOMYCIN HCL IN NACL 1.75-0.9 GM/500ML-% SOLUTION: Performed by: PODIATRIST

## 2023-07-31 PROCEDURE — 87186 SC STD MICRODIL/AGAR DIL: CPT | Performed by: PODIATRIST

## 2023-07-31 PROCEDURE — 0 LIDOCAINE 1 % SOLUTION: Performed by: PODIATRIST

## 2023-07-31 PROCEDURE — 25010000002 SUCCINYLCHOLINE PER 20 MG: Performed by: NURSE ANESTHETIST, CERTIFIED REGISTERED

## 2023-07-31 PROCEDURE — 25010000002 DEXAMETHASONE PER 1 MG: Performed by: NURSE ANESTHETIST, CERTIFIED REGISTERED

## 2023-07-31 PROCEDURE — 25010000002 PROPOFOL 200 MG/20ML EMULSION: Performed by: NURSE ANESTHETIST, CERTIFIED REGISTERED

## 2023-07-31 PROCEDURE — 88311 DECALCIFY TISSUE: CPT | Performed by: PODIATRIST

## 2023-07-31 PROCEDURE — 87176 TISSUE HOMOGENIZATION CULTR: CPT | Performed by: PODIATRIST

## 2023-07-31 PROCEDURE — 87075 CULTR BACTERIA EXCEPT BLOOD: CPT | Performed by: PODIATRIST

## 2023-07-31 PROCEDURE — 25010000002 MIDAZOLAM PER 1 MG: Performed by: NURSE ANESTHETIST, CERTIFIED REGISTERED

## 2023-07-31 PROCEDURE — 87205 SMEAR GRAM STAIN: CPT | Performed by: PODIATRIST

## 2023-07-31 PROCEDURE — 88305 TISSUE EXAM BY PATHOLOGIST: CPT | Performed by: PODIATRIST

## 2023-07-31 PROCEDURE — 25010000002 ONDANSETRON PER 1 MG: Performed by: NURSE ANESTHETIST, CERTIFIED REGISTERED

## 2023-07-31 PROCEDURE — 87070 CULTURE OTHR SPECIMN AEROBIC: CPT | Performed by: PODIATRIST

## 2023-07-31 PROCEDURE — 25010000002 BUPIVACAINE (PF) 0.25 % SOLUTION: Performed by: PODIATRIST

## 2023-07-31 RX ORDER — VANCOMYCIN 1.75 GRAM/500 ML IN 0.9 % SODIUM CHLORIDE INTRAVENOUS
1750 ONCE
Status: COMPLETED | OUTPATIENT
Start: 2023-07-31 | End: 2023-07-31

## 2023-07-31 RX ORDER — DROPERIDOL 2.5 MG/ML
0.62 INJECTION, SOLUTION INTRAMUSCULAR; INTRAVENOUS
Status: DISCONTINUED | OUTPATIENT
Start: 2023-07-31 | End: 2023-07-31 | Stop reason: HOSPADM

## 2023-07-31 RX ORDER — MIDAZOLAM HYDROCHLORIDE 1 MG/ML
INJECTION INTRAMUSCULAR; INTRAVENOUS AS NEEDED
Status: DISCONTINUED | OUTPATIENT
Start: 2023-07-31 | End: 2023-07-31 | Stop reason: SURG

## 2023-07-31 RX ORDER — SODIUM CHLORIDE 0.9 % (FLUSH) 0.9 %
10 SYRINGE (ML) INJECTION AS NEEDED
Status: DISCONTINUED | OUTPATIENT
Start: 2023-07-31 | End: 2023-07-31 | Stop reason: HOSPADM

## 2023-07-31 RX ORDER — FENTANYL CITRATE 50 UG/ML
50 INJECTION, SOLUTION INTRAMUSCULAR; INTRAVENOUS
Status: DISCONTINUED | OUTPATIENT
Start: 2023-07-31 | End: 2023-07-31 | Stop reason: HOSPADM

## 2023-07-31 RX ORDER — LIDOCAINE HYDROCHLORIDE 10 MG/ML
INJECTION, SOLUTION INFILTRATION; PERINEURAL AS NEEDED
Status: DISCONTINUED | OUTPATIENT
Start: 2023-07-31 | End: 2023-07-31 | Stop reason: HOSPADM

## 2023-07-31 RX ORDER — HYDROCODONE BITARTRATE AND ACETAMINOPHEN 7.5; 325 MG/1; MG/1
1 TABLET ORAL EVERY 6 HOURS PRN
Qty: 28 TABLET | Refills: 0 | Status: SHIPPED | OUTPATIENT
Start: 2023-07-31

## 2023-07-31 RX ORDER — ONDANSETRON 2 MG/ML
4 INJECTION INTRAMUSCULAR; INTRAVENOUS ONCE AS NEEDED
Status: DISCONTINUED | OUTPATIENT
Start: 2023-07-31 | End: 2023-07-31 | Stop reason: HOSPADM

## 2023-07-31 RX ORDER — FENTANYL CITRATE 50 UG/ML
INJECTION, SOLUTION INTRAMUSCULAR; INTRAVENOUS AS NEEDED
Status: DISCONTINUED | OUTPATIENT
Start: 2023-07-31 | End: 2023-07-31 | Stop reason: SURG

## 2023-07-31 RX ORDER — BUPIVACAINE HYDROCHLORIDE 2.5 MG/ML
INJECTION, SOLUTION EPIDURAL; INFILTRATION; INTRACAUDAL AS NEEDED
Status: DISCONTINUED | OUTPATIENT
Start: 2023-07-31 | End: 2023-07-31 | Stop reason: HOSPADM

## 2023-07-31 RX ORDER — DEXAMETHASONE SODIUM PHOSPHATE 4 MG/ML
INJECTION, SOLUTION INTRA-ARTICULAR; INTRALESIONAL; INTRAMUSCULAR; INTRAVENOUS; SOFT TISSUE AS NEEDED
Status: DISCONTINUED | OUTPATIENT
Start: 2023-07-31 | End: 2023-07-31 | Stop reason: SURG

## 2023-07-31 RX ORDER — ONDANSETRON 2 MG/ML
INJECTION INTRAMUSCULAR; INTRAVENOUS AS NEEDED
Status: DISCONTINUED | OUTPATIENT
Start: 2023-07-31 | End: 2023-07-31 | Stop reason: SURG

## 2023-07-31 RX ORDER — LIDOCAINE HYDROCHLORIDE 10 MG/ML
0.5 INJECTION, SOLUTION INFILTRATION; PERINEURAL ONCE AS NEEDED
Status: DISCONTINUED | OUTPATIENT
Start: 2023-07-31 | End: 2023-07-31 | Stop reason: HOSPADM

## 2023-07-31 RX ORDER — PROMETHAZINE HYDROCHLORIDE 25 MG/1
25 SUPPOSITORY RECTAL ONCE AS NEEDED
Status: DISCONTINUED | OUTPATIENT
Start: 2023-07-31 | End: 2023-07-31 | Stop reason: HOSPADM

## 2023-07-31 RX ORDER — DOXYCYCLINE HYCLATE 100 MG
100 TABLET ORAL 2 TIMES DAILY
Qty: 20 TABLET | Refills: 0 | Status: SHIPPED | OUTPATIENT
Start: 2023-07-31 | End: 2023-08-10

## 2023-07-31 RX ORDER — VANCOMYCIN 1.75 GRAM/500 ML IN 0.9 % SODIUM CHLORIDE INTRAVENOUS
AS NEEDED
Status: DISCONTINUED | OUTPATIENT
Start: 2023-07-31 | End: 2023-07-31 | Stop reason: SURG

## 2023-07-31 RX ORDER — SUCCINYLCHOLINE CHLORIDE 20 MG/ML
INJECTION INTRAMUSCULAR; INTRAVENOUS AS NEEDED
Status: DISCONTINUED | OUTPATIENT
Start: 2023-07-31 | End: 2023-07-31 | Stop reason: SURG

## 2023-07-31 RX ORDER — PROPOFOL 10 MG/ML
INJECTION, EMULSION INTRAVENOUS AS NEEDED
Status: DISCONTINUED | OUTPATIENT
Start: 2023-07-31 | End: 2023-07-31 | Stop reason: SURG

## 2023-07-31 RX ORDER — NALOXONE HCL 0.4 MG/ML
0.2 VIAL (ML) INJECTION AS NEEDED
Status: DISCONTINUED | OUTPATIENT
Start: 2023-07-31 | End: 2023-07-31 | Stop reason: HOSPADM

## 2023-07-31 RX ORDER — LABETALOL HYDROCHLORIDE 5 MG/ML
5 INJECTION, SOLUTION INTRAVENOUS
Status: DISCONTINUED | OUTPATIENT
Start: 2023-07-31 | End: 2023-07-31 | Stop reason: HOSPADM

## 2023-07-31 RX ORDER — HYDROCODONE BITARTRATE AND ACETAMINOPHEN 5; 325 MG/1; MG/1
1 TABLET ORAL ONCE AS NEEDED
Status: COMPLETED | OUTPATIENT
Start: 2023-07-31 | End: 2023-07-31

## 2023-07-31 RX ORDER — FLUMAZENIL 0.1 MG/ML
0.2 INJECTION INTRAVENOUS AS NEEDED
Status: DISCONTINUED | OUTPATIENT
Start: 2023-07-31 | End: 2023-07-31 | Stop reason: HOSPADM

## 2023-07-31 RX ORDER — DIPHENHYDRAMINE HYDROCHLORIDE 50 MG/ML
12.5 INJECTION INTRAMUSCULAR; INTRAVENOUS
Status: DISCONTINUED | OUTPATIENT
Start: 2023-07-31 | End: 2023-07-31 | Stop reason: HOSPADM

## 2023-07-31 RX ORDER — ROCURONIUM BROMIDE 10 MG/ML
INJECTION, SOLUTION INTRAVENOUS AS NEEDED
Status: DISCONTINUED | OUTPATIENT
Start: 2023-07-31 | End: 2023-07-31 | Stop reason: SURG

## 2023-07-31 RX ORDER — IPRATROPIUM BROMIDE AND ALBUTEROL SULFATE 2.5; .5 MG/3ML; MG/3ML
3 SOLUTION RESPIRATORY (INHALATION) ONCE AS NEEDED
Status: DISCONTINUED | OUTPATIENT
Start: 2023-07-31 | End: 2023-07-31 | Stop reason: HOSPADM

## 2023-07-31 RX ORDER — SODIUM CHLORIDE, SODIUM LACTATE, POTASSIUM CHLORIDE, CALCIUM CHLORIDE 600; 310; 30; 20 MG/100ML; MG/100ML; MG/100ML; MG/100ML
1000 INJECTION, SOLUTION INTRAVENOUS CONTINUOUS
Status: DISCONTINUED | OUTPATIENT
Start: 2023-07-31 | End: 2023-07-31 | Stop reason: HOSPADM

## 2023-07-31 RX ORDER — PROMETHAZINE HYDROCHLORIDE 25 MG/1
25 TABLET ORAL ONCE AS NEEDED
Status: DISCONTINUED | OUTPATIENT
Start: 2023-07-31 | End: 2023-07-31 | Stop reason: HOSPADM

## 2023-07-31 RX ORDER — HYDRALAZINE HYDROCHLORIDE 20 MG/ML
5 INJECTION INTRAMUSCULAR; INTRAVENOUS
Status: DISCONTINUED | OUTPATIENT
Start: 2023-07-31 | End: 2023-07-31 | Stop reason: HOSPADM

## 2023-07-31 RX ADMIN — FENTANYL CITRATE 50 MCG: 50 INJECTION, SOLUTION INTRAMUSCULAR; INTRAVENOUS at 12:35

## 2023-07-31 RX ADMIN — DEXAMETHASONE SODIUM PHOSPHATE 4 MG: 4 INJECTION, SOLUTION INTRAMUSCULAR; INTRAVENOUS at 12:30

## 2023-07-31 RX ADMIN — HYDROCODONE BITARTRATE AND ACETAMINOPHEN 1 TABLET: 5; 325 TABLET ORAL at 13:49

## 2023-07-31 RX ADMIN — LIDOCAINE HYDROCHLORIDE 100 MG: 20 INJECTION, SOLUTION EPIDURAL; INFILTRATION; INTRACAUDAL; PERINEURAL at 12:29

## 2023-07-31 RX ADMIN — FENTANYL CITRATE 50 MCG: 50 INJECTION, SOLUTION INTRAMUSCULAR; INTRAVENOUS at 12:30

## 2023-07-31 RX ADMIN — ROCURONIUM BROMIDE 30 MG: 10 INJECTION, SOLUTION INTRAVENOUS at 12:38

## 2023-07-31 RX ADMIN — Medication 1.75 G: at 12:20

## 2023-07-31 RX ADMIN — SUGAMMADEX 200 MG: 100 INJECTION, SOLUTION INTRAVENOUS at 12:55

## 2023-07-31 RX ADMIN — SUCCINYLCHOLINE CHLORIDE 140 MG: 20 INJECTION, SOLUTION INTRAMUSCULAR; INTRAVENOUS at 12:29

## 2023-07-31 RX ADMIN — Medication 1750 MG: at 10:13

## 2023-07-31 RX ADMIN — PROPOFOL 200 MG: 10 INJECTION, EMULSION INTRAVENOUS at 12:29

## 2023-07-31 RX ADMIN — HYDROMORPHONE HYDROCHLORIDE 0.5 MG: 1 INJECTION, SOLUTION INTRAMUSCULAR; INTRAVENOUS; SUBCUTANEOUS at 13:50

## 2023-07-31 RX ADMIN — SODIUM CHLORIDE, POTASSIUM CHLORIDE, SODIUM LACTATE AND CALCIUM CHLORIDE 1000 ML: 600; 310; 30; 20 INJECTION, SOLUTION INTRAVENOUS at 10:13

## 2023-07-31 RX ADMIN — MIDAZOLAM 2 MG: 1 INJECTION INTRAMUSCULAR; INTRAVENOUS at 12:30

## 2023-07-31 RX ADMIN — ONDANSETRON 4 MG: 2 INJECTION INTRAMUSCULAR; INTRAVENOUS at 12:30

## 2023-07-31 NOTE — OP NOTE
Operative Note   Foot and Ankle Surgery   Provider: Dr. Roc Smith   Location: [facility]      Procedure:  1.  Total fifth metatarsal resection, left foot  2.  Percutaneous tendo Achilles lengthening, left lower extremity  3.  Excisional debridement to subcutaneous tissue, left foot    Pre-operative Diagnosis:   1.  Chronic ulcer with bone necrosis, left foot  2.  Acquired equinus contracture, left lower extremity  3. S/p transmetatarsal amputation, left foot  4.  Diabetes mellitus with peripheral neuropathy    Post-operative Diagnosis: Same    Surgeon: Roc Smith    Assistant: Gonzalo Disla, PGY 2    Anesthesia: General    Implants: None    Findings: No grossly abnormal findings involving the fifth metatarsal.  No evidence of purulence or soft tissue necrosis.    Specimen: Fifth metatarsal bone sent to microbiology as well as pathology.    Blood Loss: Less than 5cc    Complications: None    Post Op Plan: Discharge home.  Start course of doxycycline.  Strict nonweightbearing activity.  Follow-up with me in 2 weeks.    Summary:    Patient is a 47-year-old diabetic male who has been seen in office for long-term issues involving his left lower extremity.  Patient has had a longstanding open wound to the plantar lateral aspect of his foot.  He underwent transmetatarsal amputation several years ago.  MRI was performed suggesting signs of osteomyelitis involving the residual fifth metatarsal.  We did discuss options at length.  He has undergone multiple weeks of wound care and offloading without any substantial improvement.  He has not had any gross signs of infection recently; however, I do feel that the most appropriate option would be to proceed with fifth metatarsal resection and percutaneous tendo Achilles lengthening.  Patient understands the procedure is very much salvage attempt for the foot.  He understands that he remains at risk of wound healing complications, progressive infection, and major  amputation.    Procedure, risks, complications, and goals were discussed with the patient at bedside.  Risks include but are not limited to infection, complications from anesthesia (including death), chronic pain or numbness, hematoma/seroma, deep vein thrombosis, wound complications, and potential for additional surgical procedures.  Patient understands and elects to proceed with surgery at this time. Informed consent was obtained before proceeding to the operating suite.  All questions were answered to the patient's satisfaction. No guarantees or assurances were given or implied.    Procedure:    Patient was brought to the operating room and placed on the operative table in the supine position.  Once adequate general anesthesia was administered, a pneumatic tourniquet was placed about the patient's left thigh.  The left lower extremity was scrubbed prepped and draped in the usual sterile fashion.  The limb was elevated and exsanguinated and the pneumatic tourniquet was inflated to 300 mmHg.  A formal timeout was conducted prior skin incision.    Initially, the limb was elevated and attention was focused at the level of the Achilles tendon.  The percutaneous tendo Achilles approach was performed with 2 medial and 1 lateral full-thickness incision  approximately 2 cm starting from the insertion of the Achilles tendon.  This allowed for adequate length involving the Achilles tendon without rupture.  The wounds were irrigated and closed with 3-0 nylon in a simple interrupted manner.    A separate incision was performed to the lateral aspect of the foot at the level of the fifth metatarsal base.  Incision was performed at the level of the glabrous junction.  Full-thickness incision was performed to the level of bone.  Dissection continued until the fifth metatarsal was fully visualized.  No grossly abnormal features were noted to the area.  The fifth metatarsal bone was removed in its entirety.  A portion of  the bone was sent to microbiology for culture and sensitivity.  The other portion of the bone was sent to pathology as permanent.  The wound was inspected showing no abnormal findings.  All rough edges were smoothed.  The wound was irrigated with copious amounts of normal saline.  Deep structures were closed with a 2-0 Vicryl in a simple interrupted manner.  The skin was closed with staples.    Finally, the plantar wound was debrided with a 15 blade and curette.  All viable and nonviable skin and subcutaneous tissue were resected to a healthy bleeding base.  The postdebridement measurements of the wound were 2.5 x 2.0 x 0.3 cm.    The incisions and wounds were dressed with Xeroform and sterile compressive dressings.  The tourniquet was released and a prompt hyperemic response was noted to the plantar flap of the left foot.  The limb was then placed into a well-padded posterior splint.  Patient tolerated the procedure and anesthesia well.  He was transferred from the operating room to the recovery room with vital signs stable and neurovascular status unchanged to the left lower extremity.      Dr. Roc Smith DPM  [facility]  [phone]    Note is dictated utilizing voice recognition software. Unfortunately this leads to occasional typographical errors. I apologize in advance if the situation occurs. If questions occur please do not hesitate to call our office.

## 2023-08-02 LAB
LAB AP CASE REPORT: NORMAL
PATH REPORT.FINAL DX SPEC: NORMAL
PATH REPORT.GROSS SPEC: NORMAL

## 2023-08-03 ENCOUNTER — TELEPHONE (OUTPATIENT)
Dept: PODIATRY | Facility: CLINIC | Age: 48
End: 2023-08-03

## 2023-08-03 LAB
BACTERIA SPEC AEROBE CULT: ABNORMAL
BACTERIA SPEC AEROBE CULT: ABNORMAL
GRAM STN SPEC: ABNORMAL
GRAM STN SPEC: ABNORMAL

## 2023-08-03 NOTE — TELEPHONE ENCOUNTER
----- Message from RENETTA Smith DPM sent at 8/3/2023 12:34 PM EDT -----  Can someone please call patient and reschedule his appointment from 8/14 to next week on either the ninth or 10th?  Thank you  ----- Message -----  From: Lab, Background User  Sent: 8/2/2023  10:08 AM EDT  To: RENETTA Smith DPM

## 2023-08-05 LAB — BACTERIA SPEC ANAEROBE CULT: NORMAL

## 2023-08-09 ENCOUNTER — OFFICE VISIT (OUTPATIENT)
Dept: PODIATRY | Facility: CLINIC | Age: 48
End: 2023-08-09
Payer: COMMERCIAL

## 2023-08-09 VITALS — BODY MASS INDEX: 41.45 KG/M2 | WEIGHT: 306 LBS | HEIGHT: 72 IN | OXYGEN SATURATION: 100 % | HEART RATE: 120 BPM

## 2023-08-09 DIAGNOSIS — Z89.432 HISTORY OF TRANSMETATARSAL AMPUTATION OF LEFT FOOT: ICD-10-CM

## 2023-08-09 DIAGNOSIS — M21.862 ACQUIRED POSTERIOR EQUINUS, LEFT: ICD-10-CM

## 2023-08-09 DIAGNOSIS — E11.42 DM TYPE 2 WITH DIABETIC PERIPHERAL NEUROPATHY: ICD-10-CM

## 2023-08-09 DIAGNOSIS — L97.522 CHRONIC ULCER OF LEFT FOOT WITH FAT LAYER EXPOSED: Primary | ICD-10-CM

## 2023-08-09 DIAGNOSIS — M86.172 ACUTE OSTEOMYELITIS OF LEFT FOOT: ICD-10-CM

## 2023-08-09 PROCEDURE — 99024 POSTOP FOLLOW-UP VISIT: CPT | Performed by: PODIATRIST

## 2023-08-09 RX ORDER — AMOXICILLIN AND CLAVULANATE POTASSIUM 875; 125 MG/1; MG/1
1 TABLET, FILM COATED ORAL 2 TIMES DAILY
Qty: 20 TABLET | Refills: 0 | Status: SHIPPED | OUTPATIENT
Start: 2023-08-09 | End: 2023-08-19

## 2023-08-09 NOTE — PROGRESS NOTES
08/09/2023  Foot and Ankle Surgery - Established Patient/Follow-up  Provider: Dr. Roc Smith DPM  Location: [facility]    Subjective:  Sánchez Spaulding is a 47 y.o. male.     Chief Complaint   Patient presents with    Left Foot - Post-op     7/31 Dr Smith   Chronic ulcer with bone necrosis, left foot  Acquired equinus contracture, left lower extremity  S/p transmetatarsal amputation      Follow-up     DIEGO Larose MD       HPI: The patient is a 47-year-old male who returns after surgery for cuboid planning and tibial tendo-Achilles lengthening to the left lower extremity. He is accompanied by an adult female.    The adult female reports the bleeding started when the dressing got pulled off. He acknowledges that he is currently taking his antibiotic and is almost finished. The patient acknowledges that he has collagen at home. He acknowledges he has experienced the rash previously. The adult female reports chlorhexidine was usually what has been used for prep. The adult female inquires if he should have a splint, a boot, or will he just being doing the dressing. The patient states he does not have a boot.    Additionally, the patient reports that he is currently off work for 12 weeks.    Allergies   Allergen Reactions    Trulicity [Dulaglutide] GI Intolerance    Gluconic Acid Rash     Current Outpatient Medications on File Prior to Visit   Medication Sig Dispense Refill    amLODIPine (NORVASC) 10 MG tablet Take 1 tablet by mouth Daily. 90 tablet 3    atorvastatin (LIPITOR) 40 MG tablet Take 1 tablet by mouth once daily 90 tablet 3    doxycycline (VIBRAMYICN) 100 MG tablet Take 1 tablet by mouth 2 (Two) Times a Day for 10 days. 20 tablet 0    Farxiga 10 MG tablet Take 1 tablet by mouth once daily (Patient taking differently: Take 10 mg by mouth Daily.) 90 tablet 3    HYDROcodone-acetaminophen (NORCO) 7.5-325 MG per tablet Take 1 tablet by mouth Every 6 (Six) Hours As Needed for Moderate Pain (Pain). 28  "tablet 0    Janumet  MG per tablet Take 1 tablet by mouth twice daily (Patient taking differently: Take 1 tablet by mouth 2 (Two) Times a Day With Meals.) 180 tablet 1    Lantus SoloStar 100 UNIT/ML injection pen INJECT 70 UNITS SUBCUTANEOUSLY IN THE EVENING 15 mL 3    lisinopril (PRINIVIL,ZESTRIL) 30 MG tablet Take 1 tablet by mouth Daily. 90 tablet 3    nateglinide (Starlix) 120 MG tablet Take 1 tablet by mouth 3 (Three) Times a Day Before Meals. 90 tablet 3    ReliOn Pen Needles 32G X 4 MM misc Use to inject insulin once per day 100 each 3     No current facility-administered medications on file prior to visit.       Objective   Pulse 120   Ht 182.9 cm (72\")   Wt (!) 139 kg (306 lb)   SpO2 100%   BMI 41.50 kg/mý     Foot/Ankle Exam     Left foot additional comments: 08/09/2023 plantar wound appears granular and healthy in appearance. No deep tunneling or extension. Lateral incision site is dry and stable with intact staples. Posterior incision sites are well healed with intact sutures. No progressive deformity or instability.                                                                                                                General:   Appearance: appears stated age and healthy    Orientation: AAOx3    Affect: appropriate       VASCULAR       Left Foot Vascularity   Normal vascular exam    Dorsalis pedis:  2+  Posterior tibial:  2+  Skin Temperature: warm    Edema Grading:  None  CFT:  < 3 seconds  Pedal Hair Growth:  Present  Varicosities: none        MUSCULOSKELETAL       Left Foot Musculoskeletal   Ecchymosis:  None  Tenderness: none        DERMATOLOGIC      Left Foot Dermatologic   Skin: skin intact    Nails: normal        Left Foot Additional Comments:  Diabetic foot ulcer to left plantar foot measures approximately 0.9 cm x 0.8 cm x 0.2 cm. Wound bed is red, moist, clean. Small serosanguineous drainage periwound with callus to the left plantar foot but otherwise clear, dry and intact. " No signs of infection.     01/09/2023  The wound to the plantar aspect of the left mid-foot has a circular wound that measures approximately 1 cm in diameter with granular wound base. No signs of infection or further concerns.     01/25/2023  Wound is stable and granular in appearance. Wound measures less than 1 cm in diameter. No signs of infection.     02/08/2023: Wound is relatively unchanged and measures approximately 1 cm in diameter with granular wound base. Wound extends to subcutaneous tissue with no deeper tunneling or tracking.     03/02/2022: Wound remains present and measures approximately 1 cm in diameter with granular wound base. Mild malodor.     04/13/2023:Wound to the plantar aspect of the foot is relatively unchanged and appears granular. New blister and ulceration involving the plantar lateral aspect of the foot measuring approximately 2.0 x 1.5 cm. Mild fibrinous debris and purulent appearing tissue that extends to subcutaneous tissue. No jerzy malodor. No significant fluctuance, erythema, or prominent edema involving the foot. No progressive deformity.     04/26/2023: Wound appears improved to the plantar and lateral aspects of the foot with improving skin island between the two. Granular wound base with mild serous drainage. Mild swelling, but no erythema or calor noted to the foot. No tunneling or tracking concerning purulence.     05/10/2023: Wounds are relatively stable and granular in appearance. Mild serous drainage with swelling involving the left lower extremity. No erythema, purulence, malodor, or surrounding fluctuance. No other open wounds.     05/31/2023  Wound has increased in size mildly and now measures 2.5 cm in diameter. Wound does have mildly fibrotic wound base. Mild periwound erythema. No fluctuance or jerzy purulence. No malodor.     06/20/2023  Wound is granular today with mild serous drainage. No periwound erythema or edema. No gross signs of local infection. No  progressive deformity or instability.     07/05/2023  Continued open wound involving the plantar lateral aspect of the left foot with draining wound base and mild serous drainage. No jerzy purulence, periwound erythema or edema. Wound measures approximately 2.5 cm in diameter. Moderate equinus contracture.    Assessment & Plan   Diagnoses and all orders for this visit:    1. Chronic ulcer of left foot with fat layer exposed (Primary)    2. Acute osteomyelitis of left foot    3. History of transmetatarsal amputation of left foot    4. Acquired posterior equinus, left    5. DM type 2 with diabetic peripheral neuropathy    Other orders  -     amoxicillin-clavulanate (AUGMENTIN) 875-125 MG per tablet; Take 1 tablet by mouth 2 (Two) Times a Day for 10 days.  Dispense: 20 tablet; Refill: 0        Patient returns after surgery for cuboid planning and tendo-Achilles lengthening to the left lower extremity. Patient experienced a reaction to the dressing. Upon examination of area, it was found a significant amount of hyperemia, although there is some closure to the wound. I discussed with the patient that the bone that I sent off did show some infection. I will refill his antibiotic to continue to suppress any infection.  Given that the entirety of the fifth metatarsal base was resected I do not feel that IV antibiotics are necessary at this time.  I discussed his foot looks to be in a better position and his lateral incision looks to be healing well, even though there are some skin changes. I discussed with the patient that he can shower, however, I do not want him to soak his foot. I would like him to try a collagen dressing every other day. I discussed with the patient that he can wear a boot for protection purposes. Sutures and staples will be removed today. I advise to keep an eye on the rash that is present. I discussed there was a different topical prep for surgery and he experienced the same reaction; therefore, the  area needs to be washed good. I will issue patient a CAM walking boot only for ambulating. I will see the patient back in 2 weeks.      No orders of the defined types were placed in this encounter.         Note is dictated utilizing voice recognition software. Unfortunately this leads to occasional typographical errors. I apologize in advance if the situation occurs. If questions occur please do not hesitate to call our office.    Transcribed from ambient dictation for RENETTA Smith DPM by Nelida Yen.  08/09/23   13:59 EDT    Patient or patient representative verbalized consent to the visit recording.  I have personally performed the services described in this document as transcribed by the above individual, and it is both accurate and complete.  Answers submitted by the patient for this visit:  Primary Reason for Visit (Submitted on 8/3/2023)  What is the primary reason for your visit?: Other  Other (Submitted on 8/3/2023)  Please describe your symptoms.: Follow up to left foot surgery  Have you had these symptoms before?: Yes  How long have you been having these symptoms?: 1-4 days  Please list any medications you are currently taking for this condition.: Antibiotic and pain meds

## 2023-08-10 DIAGNOSIS — E11.42 TYPE 2 DIABETES MELLITUS WITH DIABETIC POLYNEUROPATHY, WITH LONG-TERM CURRENT USE OF INSULIN: ICD-10-CM

## 2023-08-10 DIAGNOSIS — Z79.4 TYPE 2 DIABETES MELLITUS WITH DIABETIC POLYNEUROPATHY, WITH LONG-TERM CURRENT USE OF INSULIN: ICD-10-CM

## 2023-08-10 RX ORDER — NATEGLINIDE 120 MG/1
TABLET ORAL
Qty: 270 TABLET | Refills: 3 | Status: SHIPPED | OUTPATIENT
Start: 2023-08-10

## 2023-08-24 ENCOUNTER — OFFICE VISIT (OUTPATIENT)
Dept: PODIATRY | Facility: CLINIC | Age: 48
End: 2023-08-24
Payer: COMMERCIAL

## 2023-08-24 VITALS — RESPIRATION RATE: 20 BRPM | HEIGHT: 72 IN | BODY MASS INDEX: 41.45 KG/M2 | WEIGHT: 306 LBS

## 2023-08-24 DIAGNOSIS — E11.42 DM TYPE 2 WITH DIABETIC PERIPHERAL NEUROPATHY: ICD-10-CM

## 2023-08-24 DIAGNOSIS — Z89.432 HISTORY OF TRANSMETATARSAL AMPUTATION OF LEFT FOOT: ICD-10-CM

## 2023-08-24 DIAGNOSIS — L97.522 CHRONIC ULCER OF LEFT FOOT WITH FAT LAYER EXPOSED: Primary | ICD-10-CM

## 2023-08-24 DIAGNOSIS — M21.862 ACQUIRED POSTERIOR EQUINUS, LEFT: ICD-10-CM

## 2023-08-24 PROCEDURE — 99024 POSTOP FOLLOW-UP VISIT: CPT | Performed by: PODIATRIST

## 2023-08-24 NOTE — PROGRESS NOTES
08/24/2023  Foot and Ankle Surgery - Established Patient/Follow-up  Provider: Dr. Roc Smith DPM  Location: [facility]    Subjective:  Sánchez Spaulding is a 48 y.o. male.     Chief Complaint   Patient presents with    Left Foot - Post-op     7/13/2023   Total fifth metatarsal resection, left foot  2.  Percutaneous tendo Achilles lengthening, left lower extremity  3.  Excisional debridement to subcutaneous tissue, left foot      Post-op     ANKITA Larose md  11/30/2022       HPI: Sánchez Spaulding is a 48-year-old male who presents to the office today for a follow-up regarding his left foot approximately 4 weeks status post left foot surgery. He is accompanied by an adult female.    The patient states that he is doing well. He reports that he has been changing his dressing daily with collagen. He notes that he does not have a lot of drainage during the day. The patient states that he took a picture from his last visit and it is getting smaller. He adds that he still has a c-shaped hard callus around it as well. The patient states that he has not been moving his foot up and down.    Allergies   Allergen Reactions    Trulicity [Dulaglutide] GI Intolerance    Gluconic Acid Rash       Current Outpatient Medications on File Prior to Visit   Medication Sig Dispense Refill    amLODIPine (NORVASC) 10 MG tablet Take 1 tablet by mouth Daily. 90 tablet 3    atorvastatin (LIPITOR) 40 MG tablet Take 1 tablet by mouth once daily 90 tablet 3    Farxiga 10 MG tablet Take 1 tablet by mouth once daily (Patient taking differently: Take 10 mg by mouth Daily.) 90 tablet 3    Janumet  MG per tablet Take 1 tablet by mouth twice daily (Patient taking differently: Take 1 tablet by mouth 2 (Two) Times a Day With Meals.) 180 tablet 1    Lantus SoloStar 100 UNIT/ML injection pen INJECT 70 UNITS SUBCUTANEOUSLY IN THE EVENING 15 mL 3    lisinopril (PRINIVIL,ZESTRIL) 30 MG tablet Take 1 tablet by mouth Daily. 90 tablet 3  "   nateglinide (STARLIX) 120 MG tablet TAKE 1 TABLET BY MOUTH THREE TIMES DAILY BEFORE MEAL(S) 270 tablet 3    ReliOn Pen Needles 32G X 4 MM misc Use to inject insulin once per day 100 each 3     No current facility-administered medications on file prior to visit.       Objective   Resp 20   Ht 182.9 cm (72\")   Wt (!) 139 kg (306 lb)   BMI 41.50 kg/mý     Foot/Ankle Exam  Left foot additional comments: 08/09/2023 plantar wound appears granular and healthy in appearance. No deep tunneling or extension. Lateral incision site is dry and stable with intact staples. Posterior incision sites are well healed with intact sutures. No progressive deformity or instability.                                                                                                                General:   Appearance: appears stated age and healthy    Orientation: AAOx3    Affect: appropriate       VASCULAR       Left Foot Vascularity   Normal vascular exam    Dorsalis pedis:  2+  Posterior tibial:  2+  Skin Temperature: warm    Edema Grading:  None  CFT:  < 3 seconds  Pedal Hair Growth:  Present  Varicosities: none        MUSCULOSKELETAL       Left Foot Musculoskeletal   Ecchymosis:  None  Tenderness: none        DERMATOLOGIC      Left Foot Dermatologic   Skin: skin intact    Nails: normal        Left Foot Additional Comments:  Diabetic foot ulcer to left plantar foot measures approximately 0.9 cm x 0.8 cm x 0.2 cm. Wound bed is red, moist, clean. Small serosanguineous drainage periwound with callus to the left plantar foot but otherwise clear, dry and intact. No signs of infection.     01/09/2023  The wound to the plantar aspect of the left mid-foot has a circular wound that measures approximately 1 cm in diameter with granular wound base. No signs of infection or further concerns.     01/25/2023  Wound is stable and granular in appearance. Wound measures less than 1 cm in diameter. No signs of infection.     02/08/2023: Wound is " relatively unchanged and measures approximately 1 cm in diameter with granular wound base. Wound extends to subcutaneous tissue with no deeper tunneling or tracking.     03/02/2022: Wound remains present and measures approximately 1 cm in diameter with granular wound base. Mild malodor.     04/13/2023:Wound to the plantar aspect of the foot is relatively unchanged and appears granular. New blister and ulceration involving the plantar lateral aspect of the foot measuring approximately 2.0 x 1.5 cm. Mild fibrinous debris and purulent appearing tissue that extends to subcutaneous tissue. No jerzy malodor. No significant fluctuance, erythema, or prominent edema involving the foot. No progressive deformity.     04/26/2023: Wound appears improved to the plantar and lateral aspects of the foot with improving skin island between the two. Granular wound base with mild serous drainage. Mild swelling, but no erythema or calor noted to the foot. No tunneling or tracking concerning purulence.     05/10/2023: Wounds are relatively stable and granular in appearance. Mild serous drainage with swelling involving the left lower extremity. No erythema, purulence, malodor, or surrounding fluctuance. No other open wounds.     05/31/2023  Wound has increased in size mildly and now measures 2.5 cm in diameter. Wound does have mildly fibrotic wound base. Mild periwound erythema. No fluctuance or jerzy purulence. No malodor.     06/20/2023  Wound is granular today with mild serous drainage. No periwound erythema or edema. No gross signs of local infection. No progressive deformity or instability.     07/05/2023  Continued open wound involving the plantar lateral aspect of the left foot with draining wound base and mild serous drainage. No jerzy purulence, periwound erythema or edema. Wound measures approximately 2.5 cm in diameter. Moderate equinus contracture.    08/24/2023: Wound now measures 3.0 cm x 2.0 cm. Hypergranular tissue. No  purulence. No periwound erythema, no further concerns or issues. Posterior and lateral incision sites are well healed.    Assessment & Plan   Diagnoses and all orders for this visit:    1. Chronic ulcer of left foot with fat layer exposed (Primary)    2. History of transmetatarsal amputation of left foot    3. Acquired posterior equinus, left    4. DM type 2 with diabetic peripheral neuropathy      Patient presents to the office today for a follow-up regarding his left foot approximately after surgery. We discussed the etiology and biomechanics involved with his left foot surgery. I explained that it is not uncommon with chronic inflammation or infection; however, we have tried to manage the wound with an infectious route. I recommend dressing changes daily. We discussed wound care versus split-thickness skin grafts. I recommend collagen dressing every other day. I advised the patient to keep the dressing intact until Monday, 08/28/2022. I advised the patient to move his foot up and down and gentle massage on the back of his Achilles. The patient will return to the office in 2 weeks for reevaluation.    No orders of the defined types were placed in this encounter.         Note is dictated utilizing voice recognition software. Unfortunately this leads to occasional typographical errors. I apologize in advance if the situation occurs. If questions occur please do not hesitate to call our office.    Transcribed from ambient dictation for RENETTA Smith DPM by Sully Trejo.  08/24/23   11:41 EDT    Patient or patient representative verbalized consent to the visit recording.  I have personally performed the services described in this document as transcribed by the above individual, and it is both accurate and complete.  Answers submitted by the patient for this visit:  Primary Reason for Visit (Submitted on 8/22/2023)  What is the primary reason for your visit?: Other  Other (Submitted on 8/22/2023)  Please describe  your symptoms.: Check up on left foit  Have you had these symptoms before?: Yes  How long have you been having these symptoms?: Greater than 2 weeks

## 2023-09-12 ENCOUNTER — OFFICE VISIT (OUTPATIENT)
Dept: PODIATRY | Facility: CLINIC | Age: 48
End: 2023-09-12
Payer: COMMERCIAL

## 2023-09-12 VITALS — BODY MASS INDEX: 41.45 KG/M2 | OXYGEN SATURATION: 97 % | HEART RATE: 114 BPM | HEIGHT: 72 IN | WEIGHT: 306 LBS

## 2023-09-12 DIAGNOSIS — E11.42 DM TYPE 2 WITH DIABETIC PERIPHERAL NEUROPATHY: ICD-10-CM

## 2023-09-12 DIAGNOSIS — Z89.432 HISTORY OF TRANSMETATARSAL AMPUTATION OF LEFT FOOT: ICD-10-CM

## 2023-09-12 DIAGNOSIS — M21.862 ACQUIRED POSTERIOR EQUINUS, LEFT: ICD-10-CM

## 2023-09-12 DIAGNOSIS — L97.522 CHRONIC ULCER OF LEFT FOOT WITH FAT LAYER EXPOSED: Primary | ICD-10-CM

## 2023-09-12 PROCEDURE — 99024 POSTOP FOLLOW-UP VISIT: CPT | Performed by: PODIATRIST

## 2023-09-12 NOTE — PROGRESS NOTES
"09/12/2023  Foot and Ankle Surgery - Established Patient/Follow-up  Provider: Dr. Roc Smith DPM  Location: [facility]    Subjective:  Sánchez Spaulding is a 48 y.o. male.     Chief Complaint   Patient presents with    Left Foot - Follow-up     7/13/2023   Total fifth metatarsal resection  Percutaneous tendo Achilles lengthening  Excisional debridement to subcutaneous tissue      Follow-up     DIEGO Larose MD 07/2023       HPI: The patient is a 48-year-old male who presents to the clinic for a follow-up regarding his left foot wound. He is accompanied by an adult female.    He reports improvement in his left foot wound. He states he has been applying collagen and trying to leave the wound on for 2 to 3 days.    Allergies   Allergen Reactions    Trulicity [Dulaglutide] GI Intolerance    Gluconic Acid Rash       Current Outpatient Medications on File Prior to Visit   Medication Sig Dispense Refill    amLODIPine (NORVASC) 10 MG tablet Take 1 tablet by mouth Daily. 90 tablet 3    atorvastatin (LIPITOR) 40 MG tablet Take 1 tablet by mouth once daily 90 tablet 3    Farxiga 10 MG tablet Take 1 tablet by mouth once daily (Patient taking differently: Take 10 mg by mouth Daily.) 90 tablet 3    Janumet  MG per tablet Take 1 tablet by mouth twice daily (Patient taking differently: Take 1 tablet by mouth 2 (Two) Times a Day With Meals.) 180 tablet 1    Lantus SoloStar 100 UNIT/ML injection pen INJECT 70 UNITS SUBCUTANEOUSLY IN THE EVENING 15 mL 3    lisinopril (PRINIVIL,ZESTRIL) 30 MG tablet Take 1 tablet by mouth Daily. 90 tablet 3    nateglinide (STARLIX) 120 MG tablet TAKE 1 TABLET BY MOUTH THREE TIMES DAILY BEFORE MEAL(S) 270 tablet 3    ReliOn Pen Needles 32G X 4 MM misc Use to inject insulin once per day 100 each 3     No current facility-administered medications on file prior to visit.       Objective   Pulse 114   Ht 182.9 cm (72\")   Wt (!) 139 kg (306 lb)   SpO2 97%   BMI 41.50 kg/m² "     Foot/Ankle Exam  General:   Appearance: appears stated age and healthy    Orientation: AAOx3    Affect: appropriate       VASCULAR       Left Foot Vascularity   Normal vascular exam    Dorsalis pedis:  2+  Posterior tibial:  2+  Skin Temperature: warm    Edema Grading:  None  CFT:  < 3 seconds  Pedal Hair Growth:  Present  Varicosities: none        MUSCULOSKELETAL       Left Foot Musculoskeletal   Ecchymosis:  None  Tenderness: none        DERMATOLOGIC      Left Foot Dermatologic   Skin: skin intact    Nails: normal        Left Foot Additional Comments:  Diabetic foot ulcer to left plantar foot measures approximately 0.9 cm x 0.8 cm x 0.2 cm. Wound bed is red, moist, clean. Small serosanguineous drainage periwound with callus to the left plantar foot but otherwise clear, dry and intact. No signs of infection.     01/09/2023  The wound to the plantar aspect of the left mid-foot has a circular wound that measures approximately 1 cm in diameter with granular wound base. No signs of infection or further concerns.     01/25/2023  Wound is stable and granular in appearance. Wound measures less than 1 cm in diameter. No signs of infection.     02/08/2023: Wound is relatively unchanged and measures approximately 1 cm in diameter with granular wound base. Wound extends to subcutaneous tissue with no deeper tunneling or tracking.     03/02/2022: Wound remains present and measures approximately 1 cm in diameter with granular wound base. Mild malodor.     04/13/2023:Wound to the plantar aspect of the foot is relatively unchanged and appears granular. New blister and ulceration involving the plantar lateral aspect of the foot measuring approximately 2.0 x 1.5 cm. Mild fibrinous debris and purulent appearing tissue that extends to subcutaneous tissue. No jerzy malodor. No significant fluctuance, erythema, or prominent edema involving the foot. No progressive deformity.     04/26/2023: Wound appears improved to the plantar and  lateral aspects of the foot with improving skin island between the two. Granular wound base with mild serous drainage. Mild swelling, but no erythema or calor noted to the foot. No tunneling or tracking concerning purulence.     05/10/2023: Wounds are relatively stable and granular in appearance. Mild serous drainage with swelling involving the left lower extremity. No erythema, purulence, malodor, or surrounding fluctuance. No other open wounds.     05/31/2023  Wound has increased in size mildly and now measures 2.5 cm in diameter. Wound does have mildly fibrotic wound base. Mild periwound erythema. No fluctuance or jerzy purulence. No malodor.     06/20/2023  Wound is granular today with mild serous drainage. No periwound erythema or edema. No gross signs of local infection. No progressive deformity or instability.     07/05/2023  Continued open wound involving the plantar lateral aspect of the left foot with draining wound base and mild serous drainage. No jerzy purulence, periwound erythema or edema. Wound measures approximately 2.5 cm in diameter. Moderate equinus contracture.    09/12/2023: Wound is significantly improved in size. Wound now measures approximately 1.5 cm in diameter with hypergranular tissue. No deep tunneling or tracking. No rajat wound erythema, edema, or signs of infection    Assessment & Plan   Diagnoses and all orders for this visit:    1. Chronic ulcer of left foot with fat layer exposed (Primary)    2. History of transmetatarsal amputation of left foot    3. Acquired posterior equinus, left    4. DM type 2 with diabetic peripheral neuropathy        The patient is a 48-year-old male who presents to the office today for a follow-up regarding his left foot wound. His wound has significantly improved in size.  Repeat cauterization of the hypergranular tissue was performed today with silver nitrate.  We discussed the etiology and biomechanics involved with his left foot wound.  I would like  him to continue collagen dressing changes every other day and continue to monitor the wound.  The patient will return to the office in 2 to 3 weeks for reevaluation.    No orders of the defined types were placed in this encounter.         Note is dictated utilizing voice recognition software. Unfortunately this leads to occasional typographical errors. I apologize in advance if the situation occurs. If questions occur please do not hesitate to call our office.    Transcribed from ambient dictation for RENETTA Smith DPM by Sully Trejo.  09/12/23   11:27 EDT    Patient or patient representative verbalized consent to the visit recording.  I have personally performed the services described in this document as transcribed by the above individual, and it is both accurate and complete.  Answers submitted by the patient for this visit:  Primary Reason for Visit (Submitted on 9/12/2023)  What is the primary reason for your visit?: Other  Other (Submitted on 9/12/2023)  Please describe your symptoms.: Follow up  Have you had these symptoms before?: Yes  How long have you been having these symptoms?: Greater than 2 weeks

## 2023-09-28 ENCOUNTER — OFFICE VISIT (OUTPATIENT)
Dept: PODIATRY | Facility: CLINIC | Age: 48
End: 2023-09-28
Payer: COMMERCIAL

## 2023-09-28 VITALS — HEIGHT: 72 IN | OXYGEN SATURATION: 96 % | BODY MASS INDEX: 41.45 KG/M2 | WEIGHT: 306 LBS | HEART RATE: 106 BPM

## 2023-09-28 DIAGNOSIS — Z89.432 HISTORY OF TRANSMETATARSAL AMPUTATION OF LEFT FOOT: Primary | ICD-10-CM

## 2023-09-28 DIAGNOSIS — E11.42 DM TYPE 2 WITH DIABETIC PERIPHERAL NEUROPATHY: ICD-10-CM

## 2023-09-28 DIAGNOSIS — M21.962 ACQUIRED FOOT DEFORMITY, LEFT: ICD-10-CM

## 2023-09-28 PROCEDURE — 99024 POSTOP FOLLOW-UP VISIT: CPT | Performed by: PODIATRIST

## 2023-09-28 NOTE — PROGRESS NOTES
09/28/2023  Foot and Ankle Surgery - Established Patient/Follow-up  Provider: Dr. Roc Smith DPM  Location: [facility]    Subjective:  Sánchez Spaulding is a 48 y.o. male.     Chief Complaint   Patient presents with    Left Foot - Follow-up       7/13/2023   Total fifth metatarsal resection  Percutaneous tendo Achilles lengthening  Excisional debridement to subcutaneous tissue        Follow-up     DIEGO Larose MD 07/2023       HPI: The patient is a 48-year-old male who presents to the clinic for a follow-up regarding his left foot more than 2 months after cuboid resection and Achilles lengthening. He is accompanied by an adult female.    The patient states he is doing well. He inquires if he can get a heavier duty boot to wear during the holidays. He states the CROW boot did not fit into his shoe.    Allergies   Allergen Reactions    Trulicity [Dulaglutide] GI Intolerance    Gluconic Acid Rash       Current Outpatient Medications on File Prior to Visit   Medication Sig Dispense Refill    amLODIPine (NORVASC) 10 MG tablet Take 1 tablet by mouth Daily. 90 tablet 3    atorvastatin (LIPITOR) 40 MG tablet Take 1 tablet by mouth once daily 90 tablet 3    Farxiga 10 MG tablet Take 1 tablet by mouth once daily (Patient taking differently: Take 10 mg by mouth Daily.) 90 tablet 3    Janumet  MG per tablet Take 1 tablet by mouth twice daily (Patient taking differently: Take 1 tablet by mouth 2 (Two) Times a Day With Meals.) 180 tablet 1    Lantus SoloStar 100 UNIT/ML injection pen INJECT 70 UNITS SUBCUTANEOUSLY IN THE EVENING 15 mL 3    lisinopril (PRINIVIL,ZESTRIL) 30 MG tablet Take 1 tablet by mouth Daily. 90 tablet 3    nateglinide (STARLIX) 120 MG tablet TAKE 1 TABLET BY MOUTH THREE TIMES DAILY BEFORE MEAL(S) 270 tablet 3    ReliOn Pen Needles 32G X 4 MM misc Use to inject insulin once per day 100 each 3     No current facility-administered medications on file prior to visit.       Objective  "  Pulse 106   Ht 182.9 cm (72\")   Wt (!) 139 kg (306 lb)   SpO2 96%   BMI 41.50 kg/m²     Foot/Ankle Exam    GENERAL  Orientation:  AAOx3  Affect:  appropriate    VASCULAR     Right Foot Vascularity   Normal vascular exam    Dorsalis pedis:  2+  Posterior tibial:  2+  Skin temperature:  warm  Edema grading:  None  CFT:  < 3 seconds  Pedal hair growth:  Present  Varicosities:  none     Left Foot Vascularity   Normal vascular exam    Dorsalis pedis:  2+  Posterior tibial:  2+  Skin temperature:  warm  Edema grading:  None  CFT:  < 3 seconds  Pedal hair growth:  Present  Varicosities:  none     NEUROLOGIC     Right Foot Neurologic   Light touch sensation: normal  Hot/Cold sensation: normal  Achilles reflex:  2+     Left Foot Neurologic   Light touch sensation: normal  Hot/Cold sensation:  normal  Achilles reflex:  2+    MUSCULOSKELETAL     Right Foot Musculoskeletal   Arch:  Normal     Left Foot Musculoskeletal   Arch:  Normal    MUSCLE STRENGTH     Right Foot Muscle Strength   Normal strength    Foot dorsiflexion:  5  Foot plantar flexion:  5  Foot inversion:  5  Foot eversion:  5     Left Foot Muscle Strength   Normal strength    Foot dorsiflexion:  5  Foot plantar flexion:  5  Foot inversion:  5  Foot eversion:  5    DERMATOLOGIC      Right Foot Dermatologic   Skin  Right foot skin is intact.   Nails comment:  Nails 1-5     Left Foot Dermatologic   Skin  Left foot skin is intact.   Nails comment:  Nails 1-5    TESTS     Right Foot Tests   Anterior drawer: negative  Varus tilt: negative     Left Foot Tests   Anterior drawer: negative  Varus tilt: negative     Left foot additional comments: General:   Appearance: appears stated age and healthy    Orientation: AAOx3    Affect: appropriate       VASCULAR       Left Foot Vascularity   Normal vascular exam    Dorsalis pedis:  2+  Posterior tibial:  2+  Skin Temperature: warm    Edema Grading:  None  CFT:  < 3 seconds  Pedal Hair Growth:  Present  Varicosities: none  "       MUSCULOSKELETAL       Left Foot Musculoskeletal   Ecchymosis:  None  Tenderness: none        DERMATOLOGIC      Left Foot Dermatologic   Skin: skin intact    Nails: normal        Left Foot Additional Comments:  Diabetic foot ulcer to left plantar foot measures approximately 0.9 cm x 0.8 cm x 0.2 cm. Wound bed is red, moist, clean. Small serosanguineous drainage periwound with callus to the left plantar foot but otherwise clear, dry and intact. No signs of infection.     01/09/2023  The wound to the plantar aspect of the left mid-foot has a circular wound that measures approximately 1 cm in diameter with granular wound base. No signs of infection or further concerns.     01/25/2023  Wound is stable and granular in appearance. Wound measures less than 1 cm in diameter. No signs of infection.     02/08/2023: Wound is relatively unchanged and measures approximately 1 cm in diameter with granular wound base. Wound extends to subcutaneous tissue with no deeper tunneling or tracking.     03/02/2022: Wound remains present and measures approximately 1 cm in diameter with granular wound base. Mild malodor.     04/13/2023:Wound to the plantar aspect of the foot is relatively unchanged and appears granular. New blister and ulceration involving the plantar lateral aspect of the foot measuring approximately 2.0 x 1.5 cm. Mild fibrinous debris and purulent appearing tissue that extends to subcutaneous tissue. No jerzy malodor. No significant fluctuance, erythema, or prominent edema involving the foot. No progressive deformity.     04/26/2023: Wound appears improved to the plantar and lateral aspects of the foot with improving skin island between the two. Granular wound base with mild serous drainage. Mild swelling, but no erythema or calor noted to the foot. No tunneling or tracking concerning purulence.     05/10/2023: Wounds are relatively stable and granular in appearance. Mild serous drainage with swelling involving the  left lower extremity. No erythema, purulence, malodor, or surrounding fluctuance. No other open wounds.     05/31/2023  Wound has increased in size mildly and now measures 2.5 cm in diameter. Wound does have mildly fibrotic wound base. Mild periwound erythema. No fluctuance or jerzy purulence. No malodor.     06/20/2023  Wound is granular today with mild serous drainage. No periwound erythema or edema. No gross signs of local infection. No progressive deformity or instability.     07/05/2023  Continued open wound involving the plantar lateral aspect of the left foot with draining wound base and mild serous drainage. No jerzy purulence, periwound erythema or edema. Wound measures approximately 2.5 cm in diameter. Moderate equinus contracture.    09/12/2023: Wound is significantly improved in size. Wound now measures approximately 1.5 cm in diameter with hypergranular tissue. No deep tunneling or tracking. No rajat wound erythema, edema, or signs of infection        09/28/2023: Wound is now healed to the plantar lateral aspect of the left foot. No other complicating features on exam today.    Assessment & Plan   Diagnoses and all orders for this visit:    1. History of transmetatarsal amputation of left foot (Primary)    2. Acquired foot deformity, left    3. DM type 2 with diabetic peripheral neuropathy        The patient is a 48-year-old male who presents to the office today for a follow-up regarding his left foot more than 2 months after cuboid resection and Achilles lengthening. The wound is now healed to the plantar lateral aspect of the left foot. No other complicating features on exam today. We discussed the etiology and biomechanics involved with his left foot symptoms. I advised the patient to continue wearing the boot for the next 3 to 4 weeks. He may start moisturizing his left foot. I advised the patient to limit his activity and gradually increase as tolerated.  Patient does feel that he would benefit from  a Crow boot given that he is going to be increasing his activity at work over the next few months.  I do feel that this would be appropriate given his foot structure and I have referred him to [facility] for the device.  I would like him to wear the cam boot until he obtains the Crow boot.  Patient is to moisturize his foot daily and continue to monitor.  I have asked that he follow-up with me in 4 weeks for reevaluation.    No orders of the defined types were placed in this encounter.         Note is dictated utilizing voice recognition software. Unfortunately this leads to occasional typographical errors. I apologize in advance if the situation occurs. If questions occur please do not hesitate to call our office.    Transcribed from ambient dictation for RENETTA Smith DPM by Angel Barbosa.  09/28/23   12:33 EDT    Patient or patient representative verbalized consent to the visit recording.  I have personally performed the services described in this document as transcribed by the above individual, and it is both accurate and complete.

## 2023-09-29 ENCOUNTER — TELEPHONE (OUTPATIENT)
Dept: PODIATRY | Facility: CLINIC | Age: 48
End: 2023-09-29

## 2023-09-29 NOTE — TELEPHONE ENCOUNTER
Hub staff attempted to follow warm transfer process and was unsuccessful     Caller: PATIENT    Relationship to patient: SELF     Best call back number: [phone]    Patient is needing: PATENT WAS CALLING TO SPEAK TO [employer] REGARDING PAPERWORK. PATIENT STATED HIS RETURN TO WORK CERTIFICATION WAS SENT TO YOUR OFFICE TODAY SO HE WAS CALLING TO SEE IF [employer] RECEIVED THAT. PATIENT MENTIONED MERT SENT HIS FMLA EXTENSION TO THE COMPANY BUT HE SPOKE WITH THEM TODAY AND THEY CLAIM THEY NEVER RECEIVED IT. PATIENT WAS WANTING TO SEE IF MERT COULD REFAX HIS FMLA EXTENSION TO THE COMPANY ASAP. PATIENT SAID THE EXTENSION WAS FROM 09.12.23  THRU 09.30.23. PATIENT WOULD LIKE A CALL BACK TO DISCUSS THIS. THANK YOU!

## 2023-10-01 DIAGNOSIS — E11.42 TYPE 2 DIABETES MELLITUS WITH DIABETIC POLYNEUROPATHY, WITH LONG-TERM CURRENT USE OF INSULIN: ICD-10-CM

## 2023-10-01 DIAGNOSIS — Z79.4 TYPE 2 DIABETES MELLITUS WITH DIABETIC POLYNEUROPATHY, WITH LONG-TERM CURRENT USE OF INSULIN: ICD-10-CM

## 2023-10-02 RX ORDER — INSULIN GLARGINE 100 [IU]/ML
INJECTION, SOLUTION SUBCUTANEOUS
Qty: 15 ML | Refills: 0 | Status: SHIPPED | OUTPATIENT
Start: 2023-10-02

## 2023-10-25 DIAGNOSIS — Z79.4 TYPE 2 DIABETES MELLITUS WITH DIABETIC POLYNEUROPATHY, WITH LONG-TERM CURRENT USE OF INSULIN: ICD-10-CM

## 2023-10-25 DIAGNOSIS — E11.42 TYPE 2 DIABETES MELLITUS WITH DIABETIC POLYNEUROPATHY, WITH LONG-TERM CURRENT USE OF INSULIN: ICD-10-CM

## 2023-10-25 RX ORDER — INSULIN GLARGINE 100 [IU]/ML
INJECTION, SOLUTION SUBCUTANEOUS
Qty: 15 ML | Refills: 2 | Status: SHIPPED | OUTPATIENT
Start: 2023-10-25

## 2023-10-26 ENCOUNTER — OFFICE VISIT (OUTPATIENT)
Dept: PODIATRY | Facility: CLINIC | Age: 48
End: 2023-10-26
Payer: COMMERCIAL

## 2023-10-26 VITALS — HEART RATE: 102 BPM | WEIGHT: 306 LBS | HEIGHT: 72 IN | BODY MASS INDEX: 41.45 KG/M2 | OXYGEN SATURATION: 95 %

## 2023-10-26 DIAGNOSIS — E11.42 DM TYPE 2 WITH DIABETIC PERIPHERAL NEUROPATHY: ICD-10-CM

## 2023-10-26 DIAGNOSIS — M21.962 FOOT DEFORMITY, BILATERAL: ICD-10-CM

## 2023-10-26 DIAGNOSIS — M21.961 FOOT DEFORMITY, BILATERAL: ICD-10-CM

## 2023-10-26 DIAGNOSIS — Z89.432 HISTORY OF TRANSMETATARSAL AMPUTATION OF LEFT FOOT: Primary | ICD-10-CM

## 2023-10-26 NOTE — PROGRESS NOTES
"10/26/2023  Foot and Ankle Surgery - Established Patient/Follow-up  Provider: Dr. Roc Smith DPM  Location: [facility]    Subjective:  Sánchez Spaulding is a 48 y.o. male.     Chief Complaint   Patient presents with    Left Foot - Follow-up     7/13/2023   Total fifth metatarsal resection  Percutaneous tendo Achilles lengthening  Excisional debridement to subcutaneous tissue      Follow-up     DIEGO Larose MD 11/30/2022       HPI: The patient is a 48-year-old male who returns to the office today for a follow-up regarding his left foot. He is accompanied by an adult female today.    The patient is doing well overall, but he does report experiencing significant pain \"right up here\" possibly secondary to not ambulating normally. He states he attempts to wear his shoes around the house but wears the boot at work. He acknowledges he has been moisturizing his left foot. The patient reports his work is beginning to ramp up, but he has not had to be out and about too much. He denies obtaining a CROW boot.     The patient reports his right foot is continuing to do well without complications or issues.       Allergies   Allergen Reactions    Trulicity [Dulaglutide] GI Intolerance    Gluconic Acid Rash       Current Outpatient Medications on File Prior to Visit   Medication Sig Dispense Refill    amLODIPine (NORVASC) 10 MG tablet Take 1 tablet by mouth Daily. 90 tablet 3    atorvastatin (LIPITOR) 40 MG tablet Take 1 tablet by mouth once daily 90 tablet 3    Farxiga 10 MG tablet Take 1 tablet by mouth once daily (Patient taking differently: Take 10 mg by mouth Daily.) 90 tablet 3    Janumet  MG per tablet Take 1 tablet by mouth twice daily (Patient taking differently: Take 1 tablet by mouth 2 (Two) Times a Day With Meals.) 180 tablet 1    Lantus SoloStar 100 UNIT/ML injection pen INJECT 70 UNITS SUBCUTANEOUSLY IN THE EVENING 15 mL 2    lisinopril (PRINIVIL,ZESTRIL) 30 MG tablet Take 1 tablet by mouth " "Daily. 90 tablet 3    nateglinide (STARLIX) 120 MG tablet TAKE 1 TABLET BY MOUTH THREE TIMES DAILY BEFORE MEAL(S) 270 tablet 3    ReliOn Pen Needles 32G X 4 MM misc Use to inject insulin once per day 100 each 3     No current facility-administered medications on file prior to visit.       Objective   Pulse 102   Ht 182.9 cm (72\")   Wt (!) 139 kg (306 lb)   SpO2 95%   BMI 41.50 kg/m²     Foot/Ankle Exam    GENERAL  Orientation:  AAOx3  Affect:  appropriate    VASCULAR     Right Foot Vascularity   Normal vascular exam    Dorsalis pedis:  2+  Posterior tibial:  2+  Skin temperature:  warm  Edema grading:  None  CFT:  < 3 seconds  Pedal hair growth:  Present  Varicosities:  none     Left Foot Vascularity   Normal vascular exam    Dorsalis pedis:  2+  Posterior tibial:  2+  Skin temperature:  warm  Edema grading:  None  CFT:  < 3 seconds  Pedal hair growth:  Present  Varicosities:  none     NEUROLOGIC     Right Foot Neurologic   Light touch sensation: normal  Hot/Cold sensation: normal  Achilles reflex:  2+     Left Foot Neurologic   Light touch sensation: normal  Hot/Cold sensation:  normal  Achilles reflex:  2+    MUSCULOSKELETAL     Right Foot Musculoskeletal   Arch:  Normal     Left Foot Musculoskeletal   Arch:  Normal    MUSCLE STRENGTH     Right Foot Muscle Strength   Normal strength    Foot dorsiflexion:  5  Foot plantar flexion:  5  Foot inversion:  5  Foot eversion:  5     Left Foot Muscle Strength   Normal strength    Foot dorsiflexion:  5  Foot plantar flexion:  5  Foot inversion:  5  Foot eversion:  5    DERMATOLOGIC      Right Foot Dermatologic   Skin  Right foot skin is intact.   Nails comment:  Nails 1-5     Left Foot Dermatologic   Skin  Left foot skin is intact.   Nails comment:  Nails 1-5    TESTS     Right Foot Tests   Anterior drawer: negative  Varus tilt: negative     Left Foot Tests   Anterior drawer: negative  Varus tilt: negative     Left foot additional comments:     Left Foot Additional " Comments:  Diabetic foot ulcer to left plantar foot measures approximately 0.9 cm x 0.8 cm x 0.2 cm. Wound bed is red, moist, clean. Small serosanguineous drainage periwound with callus to the left plantar foot but otherwise clear, dry and intact. No signs of infection.     01/09/2023  The wound to the plantar aspect of the left mid-foot has a circular wound that measures approximately 1 cm in diameter with granular wound base. No signs of infection or further concerns.     01/25/2023  Wound is stable and granular in appearance. Wound measures less than 1 cm in diameter. No signs of infection.     02/08/2023: Wound is relatively unchanged and measures approximately 1 cm in diameter with granular wound base. Wound extends to subcutaneous tissue with no deeper tunneling or tracking.     03/02/2022: Wound remains present and measures approximately 1 cm in diameter with granular wound base. Mild malodor.     04/13/2023:Wound to the plantar aspect of the foot is relatively unchanged and appears granular. New blister and ulceration involving the plantar lateral aspect of the foot measuring approximately 2.0 x 1.5 cm. Mild fibrinous debris and purulent appearing tissue that extends to subcutaneous tissue. No jerzy malodor. No significant fluctuance, erythema, or prominent edema involving the foot. No progressive deformity.     04/26/2023: Wound appears improved to the plantar and lateral aspects of the foot with improving skin island between the two. Granular wound base with mild serous drainage. Mild swelling, but no erythema or calor noted to the foot. No tunneling or tracking concerning purulence.     05/10/2023: Wounds are relatively stable and granular in appearance. Mild serous drainage with swelling involving the left lower extremity. No erythema, purulence, malodor, or surrounding fluctuance. No other open wounds.     05/31/2023  Wound has increased in size mildly and now measures 2.5 cm in diameter. Wound does  have mildly fibrotic wound base. Mild periwound erythema. No fluctuance or jerzy purulence. No malodor.     06/20/2023  Wound is granular today with mild serous drainage. No periwound erythema or edema. No gross signs of local infection. No progressive deformity or instability.     07/05/2023  Continued open wound involving the plantar lateral aspect of the left foot with draining wound base and mild serous drainage. No jerzy purulence, periwound erythema or edema. Wound measures approximately 2.5 cm in diameter. Moderate equinus contracture.    09/12/2023: Wound is significantly improved in size. Wound now measures approximately 1.5 cm in diameter with hypergranular tissue. No deep tunneling or tracking. No rajat wound erythema, edema, or signs of infection    09/28/2023: Wound is now healed to the plantar lateral aspect of the left foot. No other complicating features on exam today.    10/26/2023  No open wounds or signs of infection involving the left foot. Minimal callus formation. No other complicating features at this time.      Assessment & Plan   Diagnoses and all orders for this visit:    1. History of transmetatarsal amputation of left foot (Primary)    2. Foot deformity, bilateral    3. DM type 2 with diabetic peripheral neuropathy        Patient presents to the office today for a follow-up regarding his left foot. His right foot is stable. No results were obtained or interpreted today. Advised the patient to continue moisturizing his left foot. Explained there are no restrictions in place at this time and he may return to normal activity; however, his activity may need to be less than was normal previously. Advised he may need to wear his boot at work with increased activity. Discussed he may have arthritis in his knee due to compensating for his neuropathy over the years. Recommended gradually transitioning out of the boot to see if this decreases his pain and continue with at-home exercises or physical  therapy. He may need to see a knee specialist if his pain persists. The patient will return to the office in 3 months for re-evaluation. Greater than 20 minutes was spent before, during, and after evaluation for patient care.    No orders of the defined types were placed in this encounter.         Note is dictated utilizing voice recognition software. Unfortunately this leads to occasional typographical errors. I apologize in advance if the situation occurs. If questions occur please do not hesitate to call our office.    Transcribed from ambient dictation for RENETTA Smith DPM by Kita Arias.  10/26/23   08:51 EDT    Patient or patient representative verbalized consent to the visit recording.  I have personally performed the services described in this document as transcribed by the above individual, and it is both accurate and complete.

## 2023-11-25 DIAGNOSIS — Z79.4 TYPE 2 DIABETES MELLITUS WITH DIABETIC POLYNEUROPATHY, WITH LONG-TERM CURRENT USE OF INSULIN: ICD-10-CM

## 2023-11-25 DIAGNOSIS — E11.42 TYPE 2 DIABETES MELLITUS WITH DIABETIC POLYNEUROPATHY, WITH LONG-TERM CURRENT USE OF INSULIN: ICD-10-CM

## 2023-11-27 RX ORDER — SITAGLIPTIN AND METFORMIN HYDROCHLORIDE 1000; 50 MG/1; MG/1
1 TABLET, FILM COATED ORAL 2 TIMES DAILY
Qty: 180 TABLET | Refills: 1 | Status: SHIPPED | OUTPATIENT
Start: 2023-11-27

## 2023-12-29 DIAGNOSIS — E11.42 TYPE 2 DIABETES MELLITUS WITH DIABETIC POLYNEUROPATHY, WITH LONG-TERM CURRENT USE OF INSULIN: ICD-10-CM

## 2023-12-29 DIAGNOSIS — Z79.4 TYPE 2 DIABETES MELLITUS WITH DIABETIC POLYNEUROPATHY, WITH LONG-TERM CURRENT USE OF INSULIN: ICD-10-CM

## 2023-12-29 RX ORDER — INSULIN GLARGINE 100 [IU]/ML
INJECTION, SOLUTION SUBCUTANEOUS
Qty: 15 ML | Refills: 0 | Status: SHIPPED | OUTPATIENT
Start: 2023-12-29

## 2024-01-18 DIAGNOSIS — Z79.4 TYPE 2 DIABETES MELLITUS WITH DIABETIC POLYNEUROPATHY, WITH LONG-TERM CURRENT USE OF INSULIN: ICD-10-CM

## 2024-01-18 DIAGNOSIS — E11.42 TYPE 2 DIABETES MELLITUS WITH DIABETIC POLYNEUROPATHY, WITH LONG-TERM CURRENT USE OF INSULIN: ICD-10-CM

## 2024-01-18 RX ORDER — INSULIN GLARGINE 100 [IU]/ML
INJECTION, SOLUTION SUBCUTANEOUS
Qty: 15 ML | Refills: 1 | Status: SHIPPED | OUTPATIENT
Start: 2024-01-18

## 2024-01-25 ENCOUNTER — OFFICE VISIT (OUTPATIENT)
Dept: PODIATRY | Facility: CLINIC | Age: 49
End: 2024-01-25
Payer: COMMERCIAL

## 2024-01-25 VITALS — HEIGHT: 72 IN | WEIGHT: 306 LBS | RESPIRATION RATE: 20 BRPM | BODY MASS INDEX: 41.45 KG/M2

## 2024-01-25 DIAGNOSIS — Z89.432 HISTORY OF TRANSMETATARSAL AMPUTATION OF LEFT FOOT: Primary | ICD-10-CM

## 2024-01-25 DIAGNOSIS — E11.42 DM TYPE 2 WITH DIABETIC PERIPHERAL NEUROPATHY: ICD-10-CM

## 2024-01-25 DIAGNOSIS — M21.962 FOOT DEFORMITY, BILATERAL: ICD-10-CM

## 2024-01-25 DIAGNOSIS — M21.961 FOOT DEFORMITY, BILATERAL: ICD-10-CM

## 2024-01-25 NOTE — PROGRESS NOTES
01/25/2024  Foot and Ankle Surgery - Established Patient/Follow-up  Provider: Dr. Roc Smith DPM  Location: [facility]    Subjective:  Sánchez Spaulding is a 48 y.o. male.     Chief Complaint   Patient presents with    Left Foot - Follow-up    Follow-up     DIEGO Larose MD 7/26/2023       HPI: The patient is a 48-year-old male who returns to the office today for issues involving his bilateral feet. He is accompanied by an adult female today.    The patient is doing well overall and notes improvement in his symptoms. He states he needs to look at new shoes or more inserts. He notes he obtained his inserts at [facility] in the past, and approximately 2 years ago, he obtained his inserts at [employer]. The patient notes no cost was incurred at [employer], but a small cost was incurred at [facility], although he is unsure of what it consisted of. The adult female believes his shoes may have been covered by insurance and the patient had to pay approximately $200 out-of-pocket for the inserts. The patient would like to try  for the inserts. He states his shoes are great, but he is on his last pair of inserts and the insert on the right is beginning to wear.     The patient reports his blood glucose levels are doing well.     Allergies   Allergen Reactions    Trulicity [Dulaglutide] GI Intolerance    Gluconic Acid Rash       Current Outpatient Medications on File Prior to Visit   Medication Sig Dispense Refill    amLODIPine (NORVASC) 10 MG tablet Take 1 tablet by mouth Daily. 90 tablet 3    atorvastatin (LIPITOR) 40 MG tablet Take 1 tablet by mouth once daily 90 tablet 3    Farxiga 10 MG tablet Take 1 tablet by mouth once daily (Patient taking differently: Take 10 mg by mouth Daily.) 90 tablet 3    Janumet  MG per tablet Take 1 tablet by mouth 2 (Two) Times a Day. 180 tablet 1    Lantus SoloStar 100 UNIT/ML injection pen INJECT 70 UNITS SUBCUTANEOUSLY IN THE EVENING 15 mL 1    lisinopril (PRINIVIL,ZESTRIL) 30  "MG tablet Take 1 tablet by mouth Daily. 90 tablet 3    nateglinide (STARLIX) 120 MG tablet TAKE 1 TABLET BY MOUTH THREE TIMES DAILY BEFORE MEAL(S) 270 tablet 3    ReliOn Pen Needles 32G X 4 MM misc Use to inject insulin once per day 100 each 3     No current facility-administered medications on file prior to visit.       Objective   Resp 20   Ht 182.9 cm (72\")   Wt (!) 139 kg (306 lb)   BMI 41.50 kg/m²     Foot/Ankle Exam    GENERAL  Orientation:  AAOx3  Affect:  appropriate    VASCULAR     Right Foot Vascularity   Normal vascular exam    Dorsalis pedis:  2+  Posterior tibial:  2+  Skin temperature:  warm  Edema grading:  None  CFT:  < 3 seconds  Pedal hair growth:  Present  Varicosities:  none     Left Foot Vascularity   Normal vascular exam    Dorsalis pedis:  2+  Posterior tibial:  2+  Skin temperature:  warm  Edema grading:  None  CFT:  < 3 seconds  Pedal hair growth:  Present  Varicosities:  none     NEUROLOGIC     Right Foot Neurologic   Light touch sensation: normal  Hot/Cold sensation: normal  Achilles reflex:  2+     Left Foot Neurologic   Light touch sensation: normal  Hot/Cold sensation:  normal  Achilles reflex:  2+    MUSCULOSKELETAL     Right Foot Musculoskeletal   Arch:  Normal     Left Foot Musculoskeletal   Arch:  Normal    MUSCLE STRENGTH     Right Foot Muscle Strength   Normal strength    Foot dorsiflexion:  5  Foot plantar flexion:  5  Foot inversion:  5  Foot eversion:  5     Left Foot Muscle Strength   Normal strength    Foot dorsiflexion:  5  Foot plantar flexion:  5  Foot inversion:  5  Foot eversion:  5    DERMATOLOGIC      Right Foot Dermatologic   Skin  Right foot skin is intact.   Nails comment:  Nails 1-5     Left Foot Dermatologic   Skin  Left foot skin is intact.   Nails comment:  Nails 1-5    TESTS     Right Foot Tests   Anterior drawer: negative  Varus tilt: negative     Left Foot Tests   Anterior drawer: negative  Varus tilt: negative     Right foot additional comments: " 01/25/2024  No open wounds or signs of infection to either foot. No gross deformity and no progressive deformity or instability.     Left foot additional comments:     Left Foot Additional Comments:  Diabetic foot ulcer to left plantar foot measures approximately 0.9 cm x 0.8 cm x 0.2 cm. Wound bed is red, moist, clean. Small serosanguineous drainage periwound with callus to the left plantar foot but otherwise clear, dry and intact. No signs of infection.     01/09/2023  The wound to the plantar aspect of the left mid-foot has a circular wound that measures approximately 1 cm in diameter with granular wound base. No signs of infection or further concerns.     01/25/2023  Wound is stable and granular in appearance. Wound measures less than 1 cm in diameter. No signs of infection.     02/08/2023: Wound is relatively unchanged and measures approximately 1 cm in diameter with granular wound base. Wound extends to subcutaneous tissue with no deeper tunneling or tracking.     03/02/2022: Wound remains present and measures approximately 1 cm in diameter with granular wound base. Mild malodor.     04/13/2023:Wound to the plantar aspect of the foot is relatively unchanged and appears granular. New blister and ulceration involving the plantar lateral aspect of the foot measuring approximately 2.0 x 1.5 cm. Mild fibrinous debris and purulent appearing tissue that extends to subcutaneous tissue. No jerzy malodor. No significant fluctuance, erythema, or prominent edema involving the foot. No progressive deformity.     04/26/2023: Wound appears improved to the plantar and lateral aspects of the foot with improving skin island between the two. Granular wound base with mild serous drainage. Mild swelling, but no erythema or calor noted to the foot. No tunneling or tracking concerning purulence.     05/10/2023: Wounds are relatively stable and granular in appearance. Mild serous drainage with swelling involving the left lower  extremity. No erythema, purulence, malodor, or surrounding fluctuance. No other open wounds.     05/31/2023  Wound has increased in size mildly and now measures 2.5 cm in diameter. Wound does have mildly fibrotic wound base. Mild periwound erythema. No fluctuance or jerzy purulence. No malodor.     06/20/2023  Wound is granular today with mild serous drainage. No periwound erythema or edema. No gross signs of local infection. No progressive deformity or instability.     07/05/2023  Continued open wound involving the plantar lateral aspect of the left foot with draining wound base and mild serous drainage. No jerzy purulence, periwound erythema or edema. Wound measures approximately 2.5 cm in diameter. Moderate equinus contracture.    09/12/2023: Wound is significantly improved in size. Wound now measures approximately 1.5 cm in diameter with hypergranular tissue. No deep tunneling or tracking. No rajat wound erythema, edema, or signs of infection    09/28/2023: Wound is now healed to the plantar lateral aspect of the left foot. No other complicating features on exam today.    10/26/2023  No open wounds or signs of infection involving the left foot. Minimal callus formation. No other complicating features at this time.    01/25/2024  No open wounds or signs of infection to either foot. No gross deformity and no progressive deformity or instability.      Assessment & Plan   Diagnoses and all orders for this visit:    1. History of transmetatarsal amputation of left foot (Primary)    2. Foot deformity, bilateral    3. DM type 2 with diabetic peripheral neuropathy        The patient returns to the office today for issues involving his bilateral feet. No results were obtained or interpreted today. He is doing well overall. Discussed new diabetic shoes and inserts. Will provide an order to [facility] as well as consider other pharmacy options. The patient will return to the office in 6 months for re-evaluation. Greater  than 20 minutes was spent before, during, and after evaluation for patient care.      No orders of the defined types were placed in this encounter.         Note is dictated utilizing voice recognition software. Unfortunately this leads to occasional typographical errors. I apologize in advance if the situation occurs. If questions occur please do not hesitate to call our office.    Transcribed from ambient dictation for RENETTA Smith DPM by Kita Arias.  01/25/24   09:56 EST    Patient or patient representative verbalized consent to the visit recording.  I have personally performed the services described in this document as transcribed by the above individual, and it is both accurate and complete.

## 2024-02-07 ENCOUNTER — TELEPHONE (OUTPATIENT)
Dept: PODIATRY | Facility: CLINIC | Age: 49
End: 2024-02-07
Payer: COMMERCIAL

## 2024-02-07 NOTE — TELEPHONE ENCOUNTER
Caller: ILEANA    Relationship: [facility]    Best call back number: [phone]    What form or medical record are you requesting: RX FOR CUSTOM ACOMMODATIVE INSERTS - ORDER HAD THE WRONG  FOR THE PATIENT    Who is requesting this form or medical record from you: [facility]    How would you like to receive the form or medical records (pick-up, mail, fax): FAX  If fax, what is the fax number: [phone]    Timeframe paperwork needed: ASAP

## 2024-02-18 DIAGNOSIS — Z79.4 TYPE 2 DIABETES MELLITUS WITH DIABETIC POLYNEUROPATHY, WITH LONG-TERM CURRENT USE OF INSULIN: ICD-10-CM

## 2024-02-18 DIAGNOSIS — E11.42 TYPE 2 DIABETES MELLITUS WITH DIABETIC POLYNEUROPATHY, WITH LONG-TERM CURRENT USE OF INSULIN: ICD-10-CM

## 2024-02-19 RX ORDER — DAPAGLIFLOZIN 10 MG/1
TABLET, FILM COATED ORAL
Qty: 90 TABLET | Refills: 3 | Status: SHIPPED | OUTPATIENT
Start: 2024-02-19

## 2024-03-04 DIAGNOSIS — E11.42 TYPE 2 DIABETES MELLITUS WITH DIABETIC POLYNEUROPATHY, WITH LONG-TERM CURRENT USE OF INSULIN: ICD-10-CM

## 2024-03-04 DIAGNOSIS — Z79.4 TYPE 2 DIABETES MELLITUS WITH DIABETIC POLYNEUROPATHY, WITH LONG-TERM CURRENT USE OF INSULIN: ICD-10-CM

## 2024-03-04 RX ORDER — INSULIN GLARGINE 100 [IU]/ML
INJECTION, SOLUTION SUBCUTANEOUS
Qty: 24 ML | Refills: 5 | Status: SHIPPED | OUTPATIENT
Start: 2024-03-04

## 2024-03-27 ENCOUNTER — TELEPHONE (OUTPATIENT)
Dept: PODIATRY | Facility: CLINIC | Age: 49
End: 2024-03-27
Payer: COMMERCIAL

## 2024-03-27 NOTE — TELEPHONE ENCOUNTER
Caller: SAMANTHA    Relationship to patient: Other    Best call back number: [phone]    Patient is needing: SAMANTHA WITH [facility] CALLED STATING SHE HAS FAXED A FROM THAT NEEDS TO BE FILLED OUT BY DR FAITH MULTIPLE TIMES T THE OFFICE FAXED THE LAST TWO MONTHS AND HAS NOT RECEIVED A RESPONSE. PLEASE ADVISE SAMANTHA ON THIS MATTER

## 2024-04-08 RX ORDER — LISINOPRIL 30 MG/1
30 TABLET ORAL DAILY
Qty: 90 TABLET | Refills: 3 | Status: SHIPPED | OUTPATIENT
Start: 2024-04-08

## 2024-05-30 DIAGNOSIS — E78.49 OTHER HYPERLIPIDEMIA: ICD-10-CM

## 2024-05-30 RX ORDER — ATORVASTATIN CALCIUM 40 MG/1
TABLET, FILM COATED ORAL
Qty: 90 TABLET | Refills: 3 | Status: SHIPPED | OUTPATIENT
Start: 2024-05-30

## 2024-06-04 ENCOUNTER — OFFICE VISIT (OUTPATIENT)
Dept: FAMILY MEDICINE CLINIC | Facility: CLINIC | Age: 49
End: 2024-06-04
Payer: COMMERCIAL

## 2024-06-04 VITALS
TEMPERATURE: 97.7 F | BODY MASS INDEX: 42.26 KG/M2 | WEIGHT: 312 LBS | HEART RATE: 88 BPM | SYSTOLIC BLOOD PRESSURE: 116 MMHG | RESPIRATION RATE: 18 BRPM | OXYGEN SATURATION: 90 % | HEIGHT: 72 IN | DIASTOLIC BLOOD PRESSURE: 71 MMHG

## 2024-06-04 DIAGNOSIS — I10 BENIGN HYPERTENSION: Primary | ICD-10-CM

## 2024-06-04 DIAGNOSIS — L97.522 CHRONIC ULCER OF LEFT FOOT WITH FAT LAYER EXPOSED: ICD-10-CM

## 2024-06-04 DIAGNOSIS — Z79.4 TYPE 2 DIABETES MELLITUS WITH DIABETIC POLYNEUROPATHY, WITH LONG-TERM CURRENT USE OF INSULIN: ICD-10-CM

## 2024-06-04 DIAGNOSIS — R80.9 MICROALBUMINURIA DUE TO TYPE 2 DIABETES MELLITUS: ICD-10-CM

## 2024-06-04 DIAGNOSIS — E78.49 OTHER HYPERLIPIDEMIA: ICD-10-CM

## 2024-06-04 DIAGNOSIS — E11.42 TYPE 2 DIABETES MELLITUS WITH DIABETIC POLYNEUROPATHY, WITH LONG-TERM CURRENT USE OF INSULIN: ICD-10-CM

## 2024-06-04 DIAGNOSIS — E11.29 MICROALBUMINURIA DUE TO TYPE 2 DIABETES MELLITUS: ICD-10-CM

## 2024-06-04 DIAGNOSIS — Z12.5 ENCOUNTER FOR PROSTATE CANCER SCREENING: ICD-10-CM

## 2024-06-04 PROCEDURE — 99214 OFFICE O/P EST MOD 30 MIN: CPT | Performed by: FAMILY MEDICINE

## 2024-06-04 NOTE — PROGRESS NOTES
Answers submitted by the patient for this visit:  Primary Reason for Visit (Submitted on 6/3/2024)  What is the primary reason for your visit?: Other  Other (Submitted on 6/3/2024)  Please describe your symptoms.: Follow up  Have you had these symptoms before?: Yes  How long have you been having these symptoms?: Greater than 2 weeks  Subjective   Sánchez Spaulding is a 48 y.o. male.   Chief Complaint   Patient presents with    Diabetes    Hypertension    Hyperlipidemia       History of Present Illness   48 y.o. male with PMH of diabetes type 2, hypertension with whitecoat syndrome, high cholesterol, diabetic polyneuropathy, diabetic retinopathy, and previous osteomyelitis of his feet.  I last saw him in July.  He was scheduled to have left 5th metatarsal head resection and Achilles tendon lengthening on July 31 of last year.  That was done.  He has followed closely with Dr. Smith since then.  He had to spend quite a bit of time off of his left foot to allow healing.  It looks like bone cultures grew out beta-hemolytic group G Streptococcus and rare Staphylococcus aureus.  Largely susceptible.    Last labs we did to follow his diabetes was July of last year.  Blood sugar this morning was 116.  He reports no changes in his medications.  He feels well.  Back to work full-time.  Looking forward to a vacation soon.  No change in visual acuity.  No change in the numbness and burning in his feet.  Reports that his left foot has healed well after his surgery.    Blood pressure today is excellent at 116/77.          Patient Active Problem List    Diagnosis Date Noted    Chronic ulcer of left foot with fat layer exposed 07/12/2023     Note Last Updated: 7/12/2023     Added automatically from request for surgery [id number]      Acute osteomyelitis of left foot 07/12/2023     Note Last Updated: 7/12/2023     Added automatically from request for surgery [id number]      Acquired posterior equinus, left 07/12/2023     Note Last Updated:  7/12/2023     Added automatically from request for surgery [id number]      Morbid obesity 05/31/2022    Microalbuminuria due to type 2 diabetes mellitus 01/23/2022    White coat syndrome with diagnosis of hypertension 01/11/2022    Charcot foot due to diabetes mellitus 01/11/2022     Note Last Updated: 1/11/2022     right      Type 2 diabetes mellitus with diabetic polyneuropathy, with long-term current use of insulin      Note Last Updated: 1/26/2022     DX in mid - 30's      Severe nonproliferative diabetic retinopathy associated with type 2 diabetes mellitus 02/15/2021    Benign hypertension 11/15/2016    Diabetic polyneuropathy 06/01/2016    Hyperlipidemia 04/27/2016           Past Surgical History:   Procedure Laterality Date    ACHILLES TENDON SURGERY Left 07/31/2023    Procedure: ACHILLES TENDON LENGTHENING;  Surgeon: RENTETA Smith DPM;  Location: [facility] MAIN OR;  Service: Podiatry;  Laterality: Left;    EYE SURGERY  2021    FOOT SURGERY      2016  foot surgery X5 2017    LUNG BIOPSY  1996    METATARSAL OSTEOTOMY Left 07/31/2023    Procedure: METATARSECTOMY;  Surgeon: RENETTA Smith DPM;  Location: [facility] MAIN OR;  Service: Podiatry;  Laterality: Left;    TENOTOMY ACHILLES TENDON  2023    Lengthening    TOENAIL EXCISION  2014    TRANS METATARSAL AMPUTATION Left 12/28/2018     Current Outpatient Medications on File Prior to Visit   Medication Sig    amLODIPine (NORVASC) 10 MG tablet Take 1 tablet by mouth Daily.    atorvastatin (LIPITOR) 40 MG tablet Take 1 tablet by mouth once daily    dapagliflozin Propanediol (Farxiga) 10 MG tablet Take 1 tablet by mouth once daily    Janumet  MG per tablet Take 1 tablet by mouth 2 (Two) Times a Day.    Lantus SoloStar 100 UNIT/ML injection pen INJECT 70 UNITS SUBCUTANEOUSLY IN THE EVENING    lisinopril (PRINIVIL,ZESTRIL) 30 MG tablet Take 1 tablet by mouth once daily    nateglinide (STARLIX) 120 MG tablet TAKE 1 TABLET BY MOUTH THREE TIMES DAILY BEFORE MEAL(S)  "   ReliOn Pen Needles 32G X 4 MM misc Use to inject insulin once per day     No current facility-administered medications on file prior to visit.     Allergies   Allergen Reactions    Trulicity [Dulaglutide] GI Intolerance    Gluconic Acid Rash     Social History     Socioeconomic History    Marital status:      Spouse name: Kenny    Number of children: 0   Tobacco Use    Smoking status: Never     Passive exposure: Never    Smokeless tobacco: Never   Vaping Use    Vaping status: Never Used   Substance and Sexual Activity    Alcohol use: Yes     Alcohol/week: 2.0 standard drinks of alcohol     Comment: Maybe 2 every two months    Drug use: No    Sexual activity: Yes     Partners: Female     Birth control/protection: None     Family History   Problem Relation Age of Onset    Kidney nephrosis Father     Cancer Mother     Cancer Maternal Grandfather     Diabetes Maternal Aunt        Review of Systems    Objective   /71 (BP Location: Right arm, Patient Position: Sitting, Cuff Size: Large Adult)   Pulse 88   Temp 97.7 °F (36.5 °C) (Infrared)   Resp 18   Ht 182.9 cm (72\")   Wt (!) 142 kg (312 lb)   SpO2 90%   BMI 42.31 kg/m²   Physical Exam  Constitutional:       Appearance: He is well-developed and overweight. He is not toxic-appearing.   HENT:      Head: Normocephalic and atraumatic.   Eyes:      Conjunctiva/sclera: Conjunctivae normal.   Cardiovascular:      Rate and Rhythm: Normal rate.   Pulmonary:      Effort: Pulmonary effort is normal. No respiratory distress.   Musculoskeletal:         General: Normal range of motion.      Cervical back: Normal range of motion.      Right lower leg: No edema.      Left lower leg: No edema.      Comments: He has protective diabetic foot wear on.   Skin:     General: Skin is warm and dry.      Findings: No rash.   Neurological:      Mental Status: He is alert and oriented to person, place, and time.      Gait: Gait normal.   Psychiatric:         Mood and Affect: " Mood normal.         Behavior: Behavior normal.           No visits with results within 4 Month(s) from this visit.   Latest known visit with results is:   Admission on 07/31/2023, Discharged on 07/31/2023   Component Date Value Ref Range Status    Glucose 07/31/2023 150 (H)  70 - 105 mg/dL Final    Serial Number: [id number]:  [id number]    Anaerobic Culture 07/31/2023 No anaerobes isolated at 5 days   Corrected    Tissue Culture 07/31/2023 Scant growth (1+) Streptococcus, Beta Hemolytic, Group G (A)   Final      This organism is considered to be universally susceptible to penicillin.  No further antibiotic testing will be performed. If Clindamycin or Erythromycin is the drug of choice, notify the laboratory within 7 days to request susceptibility testing.    Tissue Culture 07/31/2023 Rare Staphylococcus aureus (A)   Final    Gram Stain 07/31/2023 Rare (1+) WBCs per low power field   Final    Gram Stain 07/31/2023 No organisms seen   Final    Case Report 07/31/2023    Final                    Value:Surgical Pathology Report                         Case: [id number]                                  Authorizing Provider:  RENETTA Smith DPM      Collected:           07/31/2023 01:15 PM          Ordering Location:     [facility] MAIN  Received:            07/31/2023 03:01 PM                                 OR                                                                           Pathologist:           Syd Torres MD                                                            Specimen:    Foot, Left, 5th metatarsal                                                                 Final Diagnosis 07/31/2023    Final                    Value:This result contains rich text formatting which cannot be displayed here.    Gross Description 07/31/2023    Final                    Value:This result contains rich text formatting which cannot be displayed here.    Glucose 07/31/2023 136 (H)  70 - 105 mg/dL  Final    Serial Number: [id number]:  [id number]            Assessment & Plan   Diagnoses and all orders for this visit:    1. Benign hypertension (Primary)  -     CBC & Differential  -     Comprehensive Metabolic Panel    2. Other hyperlipidemia  -     Lipid Panel    3. Microalbuminuria due to type 2 diabetes mellitus    4. Type 2 diabetes mellitus with diabetic polyneuropathy, with long-term current use of insulin  -     Hemoglobin A1c  -     MicroAlbumin, Urine, Random - Urine, Clean Catch    5. Chronic ulcer of left foot with fat layer exposed    6. Encounter for prostate cancer screening  -     PSA Screen    Status of multiple chronic medical problems reviewed today as above.  Hypertension is a chronic problem currently under good control.  Continue amlodipine 10 mg/day and lisinopril 30 mg/day.  Diabetes control is improving as of the last A1c.  Sounds like his recent blood sugar numbers are good.  For now continue Janumet 50/1001 p.o. twice daily, Starlix 120 mg 3 times a day, Farxiga 10 mg/day and Lantus 70 units every evening.  Labs as above to monitor status of his diabetes, cholesterol, renal function.  Will follow-up with him when those tests are back.  Since reviewing labs will get a PSA to screen for prostate cancer and a urinalysis to evaluate for microalbumin.  I will follow-up with him when these tests are back and we will see him again in 3 months or sooner if needed.        Call with any problems or concerns before next visit       Return in about 3 months (around 9/4/2024).      Much of this report is an electronic transcription of spoken language to printed text using Dragon dictation software.  As such, the subtleties and finesse of spoken language may permit erroneous, or at times, nonsensical words or phrases to be inadvertently transcribed; thus changes may be made at a later date to rectify these errors.     Pricilla Larose MD6/4/202419:49 EDT  This note has been electronically  signed

## 2024-06-05 LAB
ALBUMIN SERPL-MCNC: 4.3 G/DL (ref 4.1–5.1)
ALBUMIN/GLOB SERPL: 1.6 {RATIO} (ref 1.2–2.2)
ALP SERPL-CCNC: 83 IU/L (ref 44–121)
ALT SERPL-CCNC: 23 IU/L (ref 0–44)
AST SERPL-CCNC: 18 IU/L (ref 0–40)
BASOPHILS # BLD AUTO: 0.1 X10E3/UL (ref 0–0.2)
BASOPHILS NFR BLD AUTO: 1 %
BILIRUB SERPL-MCNC: 0.3 MG/DL (ref 0–1.2)
BUN SERPL-MCNC: 20 MG/DL (ref 6–24)
BUN/CREAT SERPL: 21 (ref 9–20)
CALCIUM SERPL-MCNC: 9.9 MG/DL (ref 8.7–10.2)
CHLORIDE SERPL-SCNC: 101 MMOL/L (ref 96–106)
CHOLEST SERPL-MCNC: 169 MG/DL (ref 100–199)
CO2 SERPL-SCNC: 24 MMOL/L (ref 20–29)
CREAT SERPL-MCNC: 0.97 MG/DL (ref 0.76–1.27)
EGFRCR SERPLBLD CKD-EPI 2021: 95 ML/MIN/1.73
EOSINOPHIL # BLD AUTO: 0.5 X10E3/UL (ref 0–0.4)
EOSINOPHIL NFR BLD AUTO: 4 %
ERYTHROCYTE [DISTWIDTH] IN BLOOD BY AUTOMATED COUNT: 12.7 % (ref 11.6–15.4)
GLOBULIN SER CALC-MCNC: 2.7 G/DL (ref 1.5–4.5)
GLUCOSE SERPL-MCNC: 234 MG/DL (ref 70–99)
HBA1C MFR BLD: 9 % (ref 4.8–5.6)
HCT VFR BLD AUTO: 40.7 % (ref 37.5–51)
HDLC SERPL-MCNC: 37 MG/DL
HGB BLD-MCNC: 13.8 G/DL (ref 13–17.7)
IMM GRANULOCYTES # BLD AUTO: 0.1 X10E3/UL (ref 0–0.1)
IMM GRANULOCYTES NFR BLD AUTO: 1 %
LDLC SERPL CALC-MCNC: 87 MG/DL (ref 0–99)
LYMPHOCYTES # BLD AUTO: 2.8 X10E3/UL (ref 0.7–3.1)
LYMPHOCYTES NFR BLD AUTO: 27 %
MCH RBC QN AUTO: 30.7 PG (ref 26.6–33)
MCHC RBC AUTO-ENTMCNC: 33.9 G/DL (ref 31.5–35.7)
MCV RBC AUTO: 91 FL (ref 79–97)
MICROALBUMIN UR-MCNC: 21.8 UG/ML
MONOCYTES # BLD AUTO: 0.6 X10E3/UL (ref 0.1–0.9)
MONOCYTES NFR BLD AUTO: 6 %
NEUTROPHILS # BLD AUTO: 6.6 X10E3/UL (ref 1.4–7)
NEUTROPHILS NFR BLD AUTO: 61 %
PLATELET # BLD AUTO: 274 X10E3/UL (ref 150–450)
POTASSIUM SERPL-SCNC: 4.8 MMOL/L (ref 3.5–5.2)
PROT SERPL-MCNC: 7 G/DL (ref 6–8.5)
PSA SERPL-MCNC: 0.3 NG/ML (ref 0–4)
RBC # BLD AUTO: 4.49 X10E6/UL (ref 4.14–5.8)
SODIUM SERPL-SCNC: 138 MMOL/L (ref 134–144)
TRIGL SERPL-MCNC: 271 MG/DL (ref 0–149)
VLDLC SERPL CALC-MCNC: 45 MG/DL (ref 5–40)
WBC # BLD AUTO: 10.6 X10E3/UL (ref 3.4–10.8)

## 2024-06-14 DIAGNOSIS — E11.42 TYPE 2 DIABETES MELLITUS WITH DIABETIC POLYNEUROPATHY, WITH LONG-TERM CURRENT USE OF INSULIN: ICD-10-CM

## 2024-06-14 DIAGNOSIS — Z79.4 TYPE 2 DIABETES MELLITUS WITH DIABETIC POLYNEUROPATHY, WITH LONG-TERM CURRENT USE OF INSULIN: ICD-10-CM

## 2024-06-14 RX ORDER — SITAGLIPTIN AND METFORMIN HYDROCHLORIDE 1000; 50 MG/1; MG/1
1 TABLET, FILM COATED ORAL 2 TIMES DAILY
Qty: 180 TABLET | Refills: 3 | Status: SHIPPED | OUTPATIENT
Start: 2024-06-14

## 2024-06-18 DIAGNOSIS — I10 BENIGN HYPERTENSION: ICD-10-CM

## 2024-06-18 RX ORDER — AMLODIPINE BESYLATE 10 MG/1
10 TABLET ORAL DAILY
Qty: 90 TABLET | Refills: 3 | Status: SHIPPED | OUTPATIENT
Start: 2024-06-18

## 2024-07-25 ENCOUNTER — OFFICE VISIT (OUTPATIENT)
Dept: PODIATRY | Facility: CLINIC | Age: 49
End: 2024-07-25
Payer: COMMERCIAL

## 2024-07-25 VITALS — HEART RATE: 81 BPM | HEIGHT: 72 IN | OXYGEN SATURATION: 95 % | BODY MASS INDEX: 40.9 KG/M2 | WEIGHT: 302 LBS

## 2024-07-25 DIAGNOSIS — E11.42 DM TYPE 2 WITH DIABETIC PERIPHERAL NEUROPATHY: ICD-10-CM

## 2024-07-25 DIAGNOSIS — M21.961 FOOT DEFORMITY, BILATERAL: ICD-10-CM

## 2024-07-25 DIAGNOSIS — Z79.4 TYPE 2 DIABETES MELLITUS WITH HYPERGLYCEMIA, WITH LONG-TERM CURRENT USE OF INSULIN: ICD-10-CM

## 2024-07-25 DIAGNOSIS — Z89.432 HISTORY OF TRANSMETATARSAL AMPUTATION OF LEFT FOOT: Primary | ICD-10-CM

## 2024-07-25 DIAGNOSIS — E11.65 TYPE 2 DIABETES MELLITUS WITH HYPERGLYCEMIA, WITH LONG-TERM CURRENT USE OF INSULIN: ICD-10-CM

## 2024-07-25 DIAGNOSIS — M21.962 FOOT DEFORMITY, BILATERAL: ICD-10-CM

## 2024-07-25 NOTE — PROGRESS NOTES
"07/25/2024  Foot and Ankle Surgery - Established Patient/Follow-up  Provider: Dr. Roc Smith DPM  Location: [facility]    Subjective:  Sánchez Spaulding is a 48 y.o. male.     Chief Complaint   Patient presents with    Left Foot - Follow-up, Pain, Diabetes     DM FOOT CARE    Right Foot - Follow-up, Pain, Diabetes     DM FOOT CARE    Follow-up     PCP EVERETTE 06/24/24       History of Present Illness  The patient presents for evaluation of multiple medical concerns.    The patient's blood glucose levels remain stable. His previous A1c level was recorded at 7.5. He reports no ocular or renal issues, with no instances of ocular floaters.      Allergies   Allergen Reactions    Trulicity [Dulaglutide] GI Intolerance    Gluconic Acid Rash       Current Outpatient Medications on File Prior to Visit   Medication Sig Dispense Refill    amLODIPine (NORVASC) 10 MG tablet Take 1 tablet by mouth Daily. 90 tablet 3    atorvastatin (LIPITOR) 40 MG tablet Take 1 tablet by mouth once daily 90 tablet 3    dapagliflozin Propanediol (Farxiga) 10 MG tablet Take 1 tablet by mouth once daily 90 tablet 3    Janumet  MG per tablet Take 1 tablet by mouth 2 (Two) Times a Day. 180 tablet 3    Lantus SoloStar 100 UNIT/ML injection pen INJECT 70 UNITS SUBCUTANEOUSLY IN THE EVENING 24 mL 5    lisinopril (PRINIVIL,ZESTRIL) 30 MG tablet Take 1 tablet by mouth once daily 90 tablet 3    nateglinide (STARLIX) 120 MG tablet TAKE 1 TABLET BY MOUTH THREE TIMES DAILY BEFORE MEAL(S) 270 tablet 3    ReliOn Pen Needles 32G X 4 MM misc Use to inject insulin once per day 100 each 3     No current facility-administered medications on file prior to visit.       Objective   Pulse 81   Ht 182.9 cm (72\")   Wt (!) 137 kg (302 lb)   SpO2 95%   BMI 40.96 kg/m²     Foot/Ankle Exam  Physical Exam  GENERAL  Orientation:  AAOx3  Affect:  appropriate     VASCULAR      Right Foot Vascularity   Normal vascular exam    Dorsalis pedis:  2+  Posterior " tibial:  2+  Skin temperature:  warm  Edema grading:  None  CFT:  < 3 seconds  Pedal hair growth:  Present  Varicosities:  none      Left Foot Vascularity   Normal vascular exam    Dorsalis pedis:  2+  Posterior tibial:  2+  Skin temperature:  warm  Edema grading:  None  CFT:  < 3 seconds  Pedal hair growth:  Present  Varicosities:  none     NEUROLOGIC      Right Foot Neurologic   Light touch sensation: normal  Hot/Cold sensation: normal  Achilles reflex:  2+      Left Foot Neurologic   Light touch sensation: normal  Hot/Cold sensation:  normal  Achilles reflex:  2+     MUSCULOSKELETAL      Right Foot Musculoskeletal   Arch:  Normal      Left Foot Musculoskeletal   Arch:  Normal     MUSCLE STRENGTH      Right Foot Muscle Strength   Normal strength    Foot dorsiflexion:  5  Foot plantar flexion:  5  Foot inversion:  5  Foot eversion:  5      Left Foot Muscle Strength   Normal strength    Foot dorsiflexion:  5  Foot plantar flexion:  5  Foot inversion:  5  Foot eversion:  5     DERMATOLOGIC       Right Foot Dermatologic   Skin  Right foot skin is intact.   Nails comment:  Nails 1-5      Left Foot Dermatologic   Skin  Left foot skin is intact.   Nails comment:  Nails 1-5     TESTS      Right Foot Tests   Anterior drawer: negative  Varus tilt: negative      Left Foot Tests   Anterior drawer: negative  Varus tilt: negative     Right foot additional comments: 01/25/2024  No open wounds or signs of infection to either foot. No gross deformity and no progressive deformity or instability.     Left foot additional comments:     Left Foot Additional Comments:  Diabetic foot ulcer to left plantar foot measures approximately 0.9 cm x 0.8 cm x 0.2 cm. Wound bed is red, moist, clean. Small serosanguineous drainage periwound with callus to the left plantar foot but otherwise clear, dry and intact. No signs of infection.     01/09/2023  The wound to the plantar aspect of the left mid-foot has a circular wound that measures  approximately 1 cm in diameter with granular wound base. No signs of infection or further concerns.     01/25/2023  Wound is stable and granular in appearance. Wound measures less than 1 cm in diameter. No signs of infection.     02/08/2023: Wound is relatively unchanged and measures approximately 1 cm in diameter with granular wound base. Wound extends to subcutaneous tissue with no deeper tunneling or tracking.     03/02/2022: Wound remains present and measures approximately 1 cm in diameter with granular wound base. Mild malodor.     04/13/2023:Wound to the plantar aspect of the foot is relatively unchanged and appears granular. New blister and ulceration involving the plantar lateral aspect of the foot measuring approximately 2.0 x 1.5 cm. Mild fibrinous debris and purulent appearing tissue that extends to subcutaneous tissue. No jerzy malodor. No significant fluctuance, erythema, or prominent edema involving the foot. No progressive deformity.     04/26/2023: Wound appears improved to the plantar and lateral aspects of the foot with improving skin island between the two. Granular wound base with mild serous drainage. Mild swelling, but no erythema or calor noted to the foot. No tunneling or tracking concerning purulence.     05/10/2023: Wounds are relatively stable and granular in appearance. Mild serous drainage with swelling involving the left lower extremity. No erythema, purulence, malodor, or surrounding fluctuance. No other open wounds.     05/31/2023  Wound has increased in size mildly and now measures 2.5 cm in diameter. Wound does have mildly fibrotic wound base. Mild periwound erythema. No fluctuance or jerzy purulence. No malodor.     06/20/2023  Wound is granular today with mild serous drainage. No periwound erythema or edema. No gross signs of local infection. No progressive deformity or instability.     07/05/2023  Continued open wound involving the plantar lateral aspect of the left foot with  draining wound base and mild serous drainage. No jerzy purulence, periwound erythema or edema. Wound measures approximately 2.5 cm in diameter. Moderate equinus contracture.     09/12/2023: Wound is significantly improved in size. Wound now measures approximately 1.5 cm in diameter with hypergranular tissue. No deep tunneling or tracking. No rajat wound erythema, edema, or signs of infection     09/28/2023: Wound is now healed to the plantar lateral aspect of the left foot. No other complicating features on exam today.     10/26/2023  No open wounds or signs of infection involving the left foot. Minimal callus formation. No other complicating features at this time.     01/25/2024  No open wounds or signs of infection to either foot. No gross deformity and no progressive deformity or instability.    7/25/24: No open wounds or signs of infection.  No preulcerative callus formation involving the plantar lateral aspect of the left foot.  Those are well trimmed.  No progressive deformity or instability involving the feet.  Vascular status remains stable and intact.      Results  Laboratory Studies  A1c was 7.5.    Assessment & Plan   Diagnoses and all orders for this visit:    1. History of transmetatarsal amputation of left foot (Primary)    2. Foot deformity, bilateral    3. DM type 2 with diabetic peripheral neuropathy    4. Type 2 diabetes mellitus with hyperglycemia, with long-term current use of insulin      Assessment & Plan    Patient appears to be doing quite well at this time.  He has no progressive issues involving his feet.  No open wounds or areas of concern at this time.  Patient understands that he remains at high diabetic foot risk.  Patient's most recent A1c was 7.5%.  Explained importance of diabetic foot care, daily foot checks, and glycemic control. Patient should check both feet on a daily basis, monitor and control blood sugars, make sure that both feet and in between toes are towel dried after baths  or showers. Avoid barefoot walking at all times.  I discussed wearing white socks and checking shoes before wearing them. Patient was given information on proper foot care. Call the office at the first signs of a wound or with signs of infection.Reviewed proper basic stretching and manual therapy exercises along with appropriate shoes and activity.  Discussed proper use and/or avoidance of OTC anti-inflammatories.  Patient is to call with any additional issues or concerns.  Greater than 20 minutes was spent before, during, and after evaluation for patient care.  Follow-up  A follow-up appointment is scheduled for 6 months from now.             Patient or patient representative verbalized consent for the use of Ambient Listening during the visit with  RENETTA Smith DPM for chart documentation. 7/25/2024  09:40 EDT    RENETTA Smith DPM

## 2024-09-03 DIAGNOSIS — Z79.4 TYPE 2 DIABETES MELLITUS WITH DIABETIC POLYNEUROPATHY, WITH LONG-TERM CURRENT USE OF INSULIN: ICD-10-CM

## 2024-09-03 DIAGNOSIS — E11.42 TYPE 2 DIABETES MELLITUS WITH DIABETIC POLYNEUROPATHY, WITH LONG-TERM CURRENT USE OF INSULIN: ICD-10-CM

## 2024-09-03 RX ORDER — INSULIN GLARGINE 100 [IU]/ML
70 INJECTION, SOLUTION SUBCUTANEOUS EVERY EVENING
Qty: 30 ML | Refills: 5 | Status: SHIPPED | OUTPATIENT
Start: 2024-09-03

## 2024-09-10 DIAGNOSIS — E11.42 TYPE 2 DIABETES MELLITUS WITH DIABETIC POLYNEUROPATHY, WITH LONG-TERM CURRENT USE OF INSULIN: ICD-10-CM

## 2024-09-10 DIAGNOSIS — Z79.4 TYPE 2 DIABETES MELLITUS WITH DIABETIC POLYNEUROPATHY, WITH LONG-TERM CURRENT USE OF INSULIN: ICD-10-CM

## 2024-09-10 RX ORDER — INSULIN GLARGINE 100 [IU]/ML
70 INJECTION, SOLUTION SUBCUTANEOUS EVERY EVENING
Qty: 30 ML | Refills: 5 | Status: SHIPPED | OUTPATIENT
Start: 2024-09-10

## 2024-09-11 ENCOUNTER — OFFICE VISIT (OUTPATIENT)
Dept: FAMILY MEDICINE CLINIC | Facility: CLINIC | Age: 49
End: 2024-09-11
Payer: COMMERCIAL

## 2024-09-11 VITALS
HEART RATE: 81 BPM | WEIGHT: 311.6 LBS | BODY MASS INDEX: 42.2 KG/M2 | HEIGHT: 72 IN | RESPIRATION RATE: 18 BRPM | TEMPERATURE: 97.7 F | DIASTOLIC BLOOD PRESSURE: 84 MMHG | SYSTOLIC BLOOD PRESSURE: 142 MMHG | OXYGEN SATURATION: 92 %

## 2024-09-11 DIAGNOSIS — Z79.4 TYPE 2 DIABETES MELLITUS WITH DIABETIC POLYNEUROPATHY, WITH LONG-TERM CURRENT USE OF INSULIN: Primary | ICD-10-CM

## 2024-09-11 DIAGNOSIS — I10 BENIGN HYPERTENSION: ICD-10-CM

## 2024-09-11 DIAGNOSIS — E78.49 OTHER HYPERLIPIDEMIA: ICD-10-CM

## 2024-09-11 DIAGNOSIS — I10 WHITE COAT SYNDROME WITH DIAGNOSIS OF HYPERTENSION: ICD-10-CM

## 2024-09-11 DIAGNOSIS — Z12.11 COLON CANCER SCREENING: ICD-10-CM

## 2024-09-11 DIAGNOSIS — E11.42 TYPE 2 DIABETES MELLITUS WITH DIABETIC POLYNEUROPATHY, WITH LONG-TERM CURRENT USE OF INSULIN: Primary | ICD-10-CM

## 2024-09-11 PROCEDURE — 99214 OFFICE O/P EST MOD 30 MIN: CPT | Performed by: FAMILY MEDICINE

## 2024-09-11 RX ORDER — DAPAGLIFLOZIN 10 MG/1
1 TABLET, FILM COATED ORAL DAILY
Qty: 90 TABLET | Refills: 3 | Status: SHIPPED | OUTPATIENT
Start: 2024-09-11

## 2024-09-11 RX ORDER — NATEGLINIDE 120 MG/1
120 TABLET ORAL
Qty: 270 TABLET | Refills: 3 | Status: SHIPPED | OUTPATIENT
Start: 2024-09-11

## 2024-09-11 RX ORDER — ATORVASTATIN CALCIUM 40 MG/1
40 TABLET, FILM COATED ORAL DAILY
Qty: 90 TABLET | Refills: 3 | Status: SHIPPED | OUTPATIENT
Start: 2024-09-11

## 2024-09-11 RX ORDER — LISINOPRIL 30 MG/1
30 TABLET ORAL DAILY
Qty: 90 TABLET | Refills: 3 | Status: SHIPPED | OUTPATIENT
Start: 2024-09-11

## 2024-09-11 NOTE — PROGRESS NOTES
Answers submitted by the patient for this visit:  Primary Reason for Visit (Submitted on 9/11/2024)  What is the primary reason for your visit?: Diabetes  Diabetes Questionnaire (Submitted on 9/11/2024)  Chief Complaint: Diabetes problem  Diabetes type: type 2  MedicAlert ID: No  Disease duration: 10 Years  Treatment compliance: all of the time  Symptom course: stable  Home blood tests: 1-2 x per week  High score: 140-180  Below 70: never  blurred vision: No  foot paresthesias: Yes  foot ulcerations: No  polydipsia: No  polyuria: No  weight loss: No  blackouts: No  hospitalization: No  nocturnal hypoglycemia: No  required assistance: No  required glucagon: No  confusion: No  headaches: No  speech difficulty: No  sweats: No  tremors: No  Dose schedule: at bedtime  Given by: patient  Injection sites: abdominal wall  Current diet: high fat/cholesterol  Meal planning: none  Exercise: intermittently  Eye exam current: No  Sees podiatrist: Yes  Subjective   Sánchez Spaulding is a 49 y.o. male.   Chief Complaint   Patient presents with    Diabetes    Hypertension    Hyperlipidemia       History of Present Illness   49 y.o. male presents the office today to follow-up.  He has type 2 diabetes complicated by multiple foot problems and retinopathy.  Follows routinely with podiatry.  Recent blood sugar numbers-running below 150.  Blood sugar this morning was 137.    No other complaints today.  He has not had anything to screen for colon cancer.    Last A1c was 3 months ago and it was 9%.  At that time he had been incapacitated due to foot surgery.  No changes were made to his treatment then.    HTN- 142/84.     Patient Active Problem List    Diagnosis Date Noted    Chronic ulcer of left foot with fat layer exposed 07/12/2023     Note Last Updated: 7/12/2023     Added automatically from request for surgery [id number]      Acute osteomyelitis of left foot 07/12/2023     Note Last Updated: 7/12/2023     Added automatically from request  for surgery [id number]      Acquired posterior equinus, left 07/12/2023     Note Last Updated: 7/12/2023     Added automatically from request for surgery [id number]      Morbid obesity 05/31/2022    Microalbuminuria due to type 2 diabetes mellitus 01/23/2022    White coat syndrome with diagnosis of hypertension 01/11/2022    Charcot foot due to diabetes mellitus 01/11/2022     Note Last Updated: 1/11/2022     right      Type 2 diabetes mellitus with diabetic polyneuropathy, with long-term current use of insulin      Note Last Updated: 1/26/2022     DX in mid - 30's      Severe nonproliferative diabetic retinopathy associated with type 2 diabetes mellitus 02/15/2021    Benign hypertension 11/15/2016    Diabetic polyneuropathy 06/01/2016    Hyperlipidemia 04/27/2016           Past Surgical History:   Procedure Laterality Date    ACHILLES TENDON SURGERY Left 07/31/2023    Procedure: ACHILLES TENDON LENGTHENING;  Surgeon: RENETTA Smith DPM;  Location: [facility] OR;  Service: Podiatry;  Laterality: Left;    EYE SURGERY  2021    FOOT SURGERY      2016  foot surgery X5 2017    LUNG BIOPSY  1996    METATARSAL OSTEOTOMY Left 07/31/2023    Procedure: METATARSECTOMY;  Surgeon: RENETTA Smith DPM;  Location: [facility] MAIN OR;  Service: Podiatry;  Laterality: Left;    TENOTOMY ACHILLES TENDON  2023    Lengthening    TOENAIL EXCISION  2014    TRANS METATARSAL AMPUTATION Left 12/28/2018     Current Outpatient Medications on File Prior to Visit   Medication Sig    amLODIPine (NORVASC) 10 MG tablet Take 1 tablet by mouth Daily.    Janumet  MG per tablet Take 1 tablet by mouth 2 (Two) Times a Day.    Lantus SoloStar 100 UNIT/ML injection pen Inject 70 Units under the skin into the appropriate area as directed Every Evening.    ReliOn Pen Needles 32G X 4 MM misc Use to inject insulin once per day    [DISCONTINUED] atorvastatin (LIPITOR) 40 MG tablet Take 1 tablet by mouth once daily    [DISCONTINUED] dapagliflozin Propanediol  "(Farxiga) 10 MG tablet Take 1 tablet by mouth once daily    [DISCONTINUED] lisinopril (PRINIVIL,ZESTRIL) 30 MG tablet Take 1 tablet by mouth once daily    [DISCONTINUED] nateglinide (STARLIX) 120 MG tablet TAKE 1 TABLET BY MOUTH THREE TIMES DAILY BEFORE MEAL(S) (Patient not taking: Reported on 9/11/2024)     No current facility-administered medications on file prior to visit.     Allergies   Allergen Reactions    Trulicity [Dulaglutide] GI Intolerance    Gluconic Acid Rash     Social History     Socioeconomic History    Marital status:      Spouse name: Kenny    Number of children: 0   Tobacco Use    Smoking status: Never     Passive exposure: Never    Smokeless tobacco: Never   Vaping Use    Vaping status: Never Used   Substance and Sexual Activity    Alcohol use: Yes     Alcohol/week: 2.0 standard drinks of alcohol     Comment: Maybe 2 every two months    Drug use: No    Sexual activity: Yes     Partners: Female     Birth control/protection: None     Family History   Problem Relation Age of Onset    Kidney nephrosis Father     Cancer Mother     Cancer Maternal Grandfather     Diabetes Maternal Aunt        Review of Systems    Objective   /84 (BP Location: Right arm, Patient Position: Sitting, Cuff Size: Large Adult)   Pulse 81   Temp 97.7 °F (36.5 °C) (Infrared)   Resp 18   Ht 182.9 cm (72\")   Wt (!) 141 kg (311 lb 9.6 oz)   SpO2 92%   BMI 42.26 kg/m²   Physical Exam  Constitutional:       Appearance: He is well-developed.      Comments:      HENT:      Head: Normocephalic and atraumatic.   Eyes:      Conjunctiva/sclera: Conjunctivae normal.   Cardiovascular:      Rate and Rhythm: Normal rate.   Pulmonary:      Effort: Pulmonary effort is normal.   Musculoskeletal:         General: Normal range of motion.      Cervical back: Normal range of motion.   Skin:     General: Skin is warm and dry.      Findings: No rash.   Neurological:      Mental Status: He is alert and oriented to person, place, " and time.   Psychiatric:         Behavior: Behavior normal.           Office Visit on 06/04/2024   Component Date Value Ref Range Status    Hemoglobin A1C 06/04/2024 9.0 (H)  4.8 - 5.6 % Final    Comment:          Prediabetes: 5.7 - 6.4           Diabetes: >6.4           Glycemic control for adults with diabetes: <7.0      WBC 06/04/2024 10.6  3.4 - 10.8 x10E3/uL Final    RBC 06/04/2024 4.49  4.14 - 5.80 x10E6/uL Final    Hemoglobin 06/04/2024 13.8  13.0 - 17.7 g/dL Final    Hematocrit 06/04/2024 40.7  37.5 - 51.0 % Final    MCV 06/04/2024 91  79 - 97 fL Final    MCH 06/04/2024 30.7  26.6 - 33.0 pg Final    MCHC 06/04/2024 33.9  31.5 - 35.7 g/dL Final    RDW 06/04/2024 12.7  11.6 - 15.4 % Final    Platelets 06/04/2024 274  150 - 450 x10E3/uL Final    Neutrophil Rel % 06/04/2024 61  Not Estab. % Final    Lymphocyte Rel % 06/04/2024 27  Not Estab. % Final    Monocyte Rel % 06/04/2024 6  Not Estab. % Final    Eosinophil Rel % 06/04/2024 4  Not Estab. % Final    Basophil Rel % 06/04/2024 1  Not Estab. % Final    Neutrophils Absolute 06/04/2024 6.6  1.4 - 7.0 x10E3/uL Final    Lymphocytes Absolute 06/04/2024 2.8  0.7 - 3.1 x10E3/uL Final    Monocytes Absolute 06/04/2024 0.6  0.1 - 0.9 x10E3/uL Final    Eosinophils Absolute 06/04/2024 0.5 (H)  0.0 - 0.4 x10E3/uL Final    Basophils Absolute 06/04/2024 0.1  0.0 - 0.2 x10E3/uL Final    Immature Granulocyte Rel % 06/04/2024 1  Not Estab. % Final    Immature Grans Absolute 06/04/2024 0.1  0.0 - 0.1 x10E3/uL Final    Glucose 06/04/2024 234 (H)  70 - 99 mg/dL Final    BUN 06/04/2024 20  6 - 24 mg/dL Final    Creatinine 06/04/2024 0.97  0.76 - 1.27 mg/dL Final    EGFR Result 06/04/2024 95  >59 mL/min/1.73 Final    BUN/Creatinine Ratio 06/04/2024 21 (H)  9 - 20 Final    Sodium 06/04/2024 138  134 - 144 mmol/L Final    Potassium 06/04/2024 4.8  3.5 - 5.2 mmol/L Final    Chloride 06/04/2024 101  96 - 106 mmol/L Final    Total CO2 06/04/2024 24  20 - 29 mmol/L Final    Calcium  06/04/2024 9.9  8.7 - 10.2 mg/dL Final    Total Protein 06/04/2024 7.0  6.0 - 8.5 g/dL Final    Albumin 06/04/2024 4.3  4.1 - 5.1 g/dL Final    Globulin 06/04/2024 2.7  1.5 - 4.5 g/dL Final    A/G Ratio 06/04/2024 1.6  1.2 - 2.2 Final    Total Bilirubin 06/04/2024 0.3  0.0 - 1.2 mg/dL Final    Alkaline Phosphatase 06/04/2024 83  44 - 121 IU/L Final    AST (SGOT) 06/04/2024 18  0 - 40 IU/L Final    ALT (SGPT) 06/04/2024 23  0 - 44 IU/L Final    Total Cholesterol 06/04/2024 169  100 - 199 mg/dL Final    Triglycerides 06/04/2024 271 (H)  0 - 149 mg/dL Final    HDL Cholesterol 06/04/2024 37 (L)  >39 mg/dL Final    VLDL Cholesterol Lonny 06/04/2024 45 (H)  5 - 40 mg/dL Final    LDL Chol Calc (NIH) 06/04/2024 87  0 - 99 mg/dL Final    Microalbumin, Urine 06/04/2024 21.8  Not Estab. ug/mL Final    PSA 06/04/2024 0.3  0.0 - 4.0 ng/mL Final    Comment: Roche ECLIA methodology.  According to the American Urological Association, Serum PSA should  decrease and remain at undetectable levels after radical  prostatectomy. The AUA defines biochemical recurrence as an initial  PSA value 0.2 ng/mL or greater followed by a subsequent confirmatory  PSA value 0.2 ng/mL or greater.  Values obtained with different assay methods or kits cannot be used  interchangeably. Results cannot be interpreted as absolute evidence  of the presence or absence of malignant disease.              Assessment & Plan   Diagnoses and all orders for this visit:    1. Type 2 diabetes mellitus with diabetic polyneuropathy, with long-term current use of insulin (Primary)  -     nateglinide (STARLIX) 120 MG tablet; Take 1 tablet by mouth 3 (Three) Times a Day Before Meals.  Dispense: 270 tablet; Refill: 3  -     dapagliflozin Propanediol (Farxiga) 10 MG tablet; Take 10 mg by mouth Daily.  Dispense: 90 tablet; Refill: 3  -     Hemoglobin A1c    2. Benign hypertension  -     CBC & Differential  -     Comprehensive Metabolic Panel  -     Magnesium    3. White coat  syndrome with diagnosis of hypertension    4. Colon cancer screening  -     Cologuard - Stool, Per Rectum; Future    5. Other hyperlipidemia  -     atorvastatin (LIPITOR) 40 MG tablet; Take 1 tablet by mouth Daily.  Dispense: 90 tablet; Refill: 3    Other orders  -     lisinopril (PRINIVIL,ZESTRIL) 30 MG tablet; Take 1 tablet by mouth Daily.  Dispense: 90 tablet; Refill: 3    Status of multiple chronic medical problems reviewed today as above.  Labs to monitor his diabetes.  I have refilled multiple medications to make sure they are going to the correct pharmacy-[facility] in [place] where he works.  Blood pressure control is stable.  Continue lisinopril at 30 mg/day.  Cologuard to screen for colon cancer.  I will follow-up with him when the blood work is back as above.  Follow-up here again in 3 months or sooner if needed.        Call with any problems or concerns before next visit       Return in about 3 months (around 12/11/2024).      Much of this report is an electronic transcription of spoken language to printed text using Dragon dictation software.  As such, the subtleties and finesse of spoken language may permit erroneous, or at times, nonsensical words or phrases to be inadvertently transcribed; thus changes may be made at a later date to rectify these errors.     Pricilla Larose [id number]/11/202409:28 EDT  This note has been electronically signed

## 2024-09-12 LAB
ALBUMIN SERPL-MCNC: 4.3 G/DL (ref 3.8–4.9)
ALP SERPL-CCNC: 88 IU/L (ref 44–121)
ALT SERPL-CCNC: 17 IU/L (ref 0–44)
AST SERPL-CCNC: 15 IU/L (ref 0–40)
BASOPHILS # BLD AUTO: 0.1 X10E3/UL (ref 0–0.2)
BASOPHILS NFR BLD AUTO: 1 %
BILIRUB SERPL-MCNC: 0.3 MG/DL (ref 0–1.2)
BUN SERPL-MCNC: 16 MG/DL (ref 6–24)
BUN/CREAT SERPL: 15 (ref 9–20)
CALCIUM SERPL-MCNC: 9.8 MG/DL (ref 8.7–10.2)
CHLORIDE SERPL-SCNC: 99 MMOL/L (ref 96–106)
CO2 SERPL-SCNC: 24 MMOL/L (ref 20–29)
CREAT SERPL-MCNC: 1.04 MG/DL (ref 0.76–1.27)
EGFRCR SERPLBLD CKD-EPI 2021: 87 ML/MIN/1.73
EOSINOPHIL # BLD AUTO: 0.5 X10E3/UL (ref 0–0.4)
EOSINOPHIL NFR BLD AUTO: 5 %
ERYTHROCYTE [DISTWIDTH] IN BLOOD BY AUTOMATED COUNT: 13 % (ref 11.6–15.4)
GLOBULIN SER CALC-MCNC: 2.8 G/DL (ref 1.5–4.5)
GLUCOSE SERPL-MCNC: 161 MG/DL (ref 70–99)
HBA1C MFR BLD: 9.1 % (ref 4.8–5.6)
HCT VFR BLD AUTO: 45.7 % (ref 37.5–51)
HGB BLD-MCNC: 14.6 G/DL (ref 13–17.7)
IMM GRANULOCYTES # BLD AUTO: 0.1 X10E3/UL (ref 0–0.1)
IMM GRANULOCYTES NFR BLD AUTO: 1 %
LYMPHOCYTES # BLD AUTO: 2.3 X10E3/UL (ref 0.7–3.1)
LYMPHOCYTES NFR BLD AUTO: 25 %
MAGNESIUM SERPL-MCNC: 1.9 MG/DL (ref 1.6–2.3)
MCH RBC QN AUTO: 29.9 PG (ref 26.6–33)
MCHC RBC AUTO-ENTMCNC: 31.9 G/DL (ref 31.5–35.7)
MCV RBC AUTO: 94 FL (ref 79–97)
MONOCYTES # BLD AUTO: 0.6 X10E3/UL (ref 0.1–0.9)
MONOCYTES NFR BLD AUTO: 6 %
NEUTROPHILS # BLD AUTO: 5.8 X10E3/UL (ref 1.4–7)
NEUTROPHILS NFR BLD AUTO: 62 %
PLATELET # BLD AUTO: 254 X10E3/UL (ref 150–450)
POTASSIUM SERPL-SCNC: 5.3 MMOL/L (ref 3.5–5.2)
PROT SERPL-MCNC: 7.1 G/DL (ref 6–8.5)
RBC # BLD AUTO: 4.88 X10E6/UL (ref 4.14–5.8)
SODIUM SERPL-SCNC: 138 MMOL/L (ref 134–144)
WBC # BLD AUTO: 9.3 X10E3/UL (ref 3.4–10.8)

## 2024-09-16 DIAGNOSIS — E11.42 TYPE 2 DIABETES MELLITUS WITH DIABETIC POLYNEUROPATHY, WITH LONG-TERM CURRENT USE OF INSULIN: Primary | ICD-10-CM

## 2024-09-16 DIAGNOSIS — Z79.4 TYPE 2 DIABETES MELLITUS WITH DIABETIC POLYNEUROPATHY, WITH LONG-TERM CURRENT USE OF INSULIN: Primary | ICD-10-CM

## 2024-10-15 DIAGNOSIS — Z79.4 TYPE 2 DIABETES MELLITUS WITH DIABETIC POLYNEUROPATHY, WITH LONG-TERM CURRENT USE OF INSULIN: ICD-10-CM

## 2024-10-15 DIAGNOSIS — E11.42 TYPE 2 DIABETES MELLITUS WITH DIABETIC POLYNEUROPATHY, WITH LONG-TERM CURRENT USE OF INSULIN: ICD-10-CM

## 2024-11-14 DIAGNOSIS — E11.42 TYPE 2 DIABETES MELLITUS WITH DIABETIC POLYNEUROPATHY, WITH LONG-TERM CURRENT USE OF INSULIN: ICD-10-CM

## 2024-11-14 DIAGNOSIS — Z79.4 TYPE 2 DIABETES MELLITUS WITH DIABETIC POLYNEUROPATHY, WITH LONG-TERM CURRENT USE OF INSULIN: ICD-10-CM

## 2024-12-02 DIAGNOSIS — Z79.4 TYPE 2 DIABETES MELLITUS WITH DIABETIC POLYNEUROPATHY, WITH LONG-TERM CURRENT USE OF INSULIN: Primary | ICD-10-CM

## 2024-12-02 DIAGNOSIS — E11.42 TYPE 2 DIABETES MELLITUS WITH DIABETIC POLYNEUROPATHY, WITH LONG-TERM CURRENT USE OF INSULIN: Primary | ICD-10-CM

## 2024-12-07 DIAGNOSIS — E11.42 TYPE 2 DIABETES MELLITUS WITH DIABETIC POLYNEUROPATHY, WITH LONG-TERM CURRENT USE OF INSULIN: ICD-10-CM

## 2024-12-07 DIAGNOSIS — Z79.4 TYPE 2 DIABETES MELLITUS WITH DIABETIC POLYNEUROPATHY, WITH LONG-TERM CURRENT USE OF INSULIN: ICD-10-CM

## 2025-01-29 ENCOUNTER — OFFICE VISIT (OUTPATIENT)
Age: 50
End: 2025-01-29
Payer: COMMERCIAL

## 2025-01-29 VITALS — HEIGHT: 72 IN | WEIGHT: 311 LBS | BODY MASS INDEX: 42.12 KG/M2 | OXYGEN SATURATION: 95 %

## 2025-01-29 DIAGNOSIS — M21.962 FOOT DEFORMITY, BILATERAL: Primary | ICD-10-CM

## 2025-01-29 DIAGNOSIS — E11.42 DM TYPE 2 WITH DIABETIC PERIPHERAL NEUROPATHY: ICD-10-CM

## 2025-01-29 DIAGNOSIS — M14.671 CHARCOT JOINT OF RIGHT FOOT: ICD-10-CM

## 2025-01-29 DIAGNOSIS — Z79.4 TYPE 2 DIABETES MELLITUS WITH HYPERGLYCEMIA, WITH LONG-TERM CURRENT USE OF INSULIN: ICD-10-CM

## 2025-01-29 DIAGNOSIS — Z89.432 HISTORY OF TRANSMETATARSAL AMPUTATION OF LEFT FOOT: ICD-10-CM

## 2025-01-29 DIAGNOSIS — M21.961 FOOT DEFORMITY, BILATERAL: Primary | ICD-10-CM

## 2025-01-29 DIAGNOSIS — E11.65 TYPE 2 DIABETES MELLITUS WITH HYPERGLYCEMIA, WITH LONG-TERM CURRENT USE OF INSULIN: ICD-10-CM

## 2025-01-29 RX ORDER — TIRZEPATIDE 5 MG/.5ML
INJECTION, SOLUTION SUBCUTANEOUS
COMMUNITY
Start: 2024-10-15

## 2025-01-30 NOTE — PROGRESS NOTES
01/29/2025  Foot and Ankle Surgery - Established Patient/Follow-up  Provider: Dr. Roc Smith DPM  Location: [facility]    Subjective:  Sánchez Spaulding is a 49 y.o. male.     Chief Complaint   Patient presents with    Left Foot - Follow-up, Pain    Follow-up     Pricilla Larose MD- 09/11/2024       History of Present Illness  The patient presents for evaluation of his feet.    He reports a general sense of well-being, with no significant health issues over the past 2 years. He has not undergone any recent radiographic examinations.    His blood glucose levels have been well-controlled since starting Mounjaro in September, with readings consistently around 100. He has experienced a weight loss of approximately 28 pounds. However, he has not had an A1c test since starting the medication.    MEDICATIONS  Current: Mounjaro      Allergies   Allergen Reactions    Trulicity [Dulaglutide] GI Intolerance    Gluconic Acid Rash       Current Outpatient Medications on File Prior to Visit   Medication Sig Dispense Refill    amLODIPine (NORVASC) 10 MG tablet Take 1 tablet by mouth Daily. 90 tablet 3    atorvastatin (LIPITOR) 40 MG tablet Take 1 tablet by mouth Daily. 90 tablet 3    dapagliflozin Propanediol (Farxiga) 10 MG tablet Take 10 mg by mouth Daily. 90 tablet 3    Janumet  MG per tablet Take 1 tablet by mouth 2 (Two) Times a Day. 180 tablet 3    Lantus SoloStar 100 UNIT/ML injection pen Inject 70 Units under the skin into the appropriate area as directed Every Evening. 30 mL 5    lisinopril (PRINIVIL,ZESTRIL) 30 MG tablet Take 1 tablet by mouth Daily. 90 tablet 3    Mounjaro 5 MG/0.5ML solution auto-injector INJECT 5 MG UNDER THE SKIN INTO THE APPROPRIATE AREA AS DIRECTED ONCE WEEKLY      nateglinide (STARLIX) 120 MG tablet Take 1 tablet by mouth 3 (Three) Times a Day Before Meals. 270 tablet 3    ReliOn Pen Needles 32G X 4 MM misc Use to inject insulin once per day 100 each 3    Tirzepatide 2.5  "MG/0.5ML solution auto-injector Inject 2.5 mg under the skin into the appropriate area as directed Every 7 (Seven) Days. 2 mL 1     No current facility-administered medications on file prior to visit.       Objective   Ht 182.9 cm (72\")   Wt (!) 141 kg (311 lb)   SpO2 95%   BMI 42.18 kg/m²     Foot/Ankle Exam  Physical Exam  GENERAL  Orientation:  AAOx3  Affect:  appropriate     VASCULAR      Right Foot Vascularity   Normal vascular exam    Dorsalis pedis:  2+  Posterior tibial:  2+  Skin temperature:  warm  Edema grading:  None  CFT:  < 3 seconds  Pedal hair growth:  Present  Varicosities:  none      Left Foot Vascularity   Normal vascular exam    Dorsalis pedis:  2+  Posterior tibial:  2+  Skin temperature:  warm  Edema grading:  None  CFT:  < 3 seconds  Pedal hair growth:  Present  Varicosities:  none     NEUROLOGIC      Right Foot Neurologic   Light touch sensation: normal  Hot/Cold sensation: normal  Achilles reflex:  2+      Left Foot Neurologic   Light touch sensation: normal  Hot/Cold sensation:  normal  Achilles reflex:  2+     MUSCULOSKELETAL      Right Foot Musculoskeletal   Arch:  Normal      Left Foot Musculoskeletal   Arch:  Normal     MUSCLE STRENGTH      Right Foot Muscle Strength   Normal strength    Foot dorsiflexion:  5  Foot plantar flexion:  5  Foot inversion:  5  Foot eversion:  5      Left Foot Muscle Strength   Normal strength    Foot dorsiflexion:  5  Foot plantar flexion:  5  Foot inversion:  5  Foot eversion:  5     DERMATOLOGIC       Right Foot Dermatologic   Skin  Right foot skin is intact.   Nails comment:  Nails 1-5      Left Foot Dermatologic   Skin  Left foot skin is intact.   Nails comment:  Nails 1-5     TESTS      Right Foot Tests   Anterior drawer: negative  Varus tilt: negative      Left Foot Tests   Anterior drawer: negative  Varus tilt: negative     Right foot additional comments: 01/25/2024  No open wounds or signs of infection to either foot. No gross deformity and no " progressive deformity or instability.     Left foot additional comments:     Left Foot Additional Comments:  Diabetic foot ulcer to left plantar foot measures approximately 0.9 cm x 0.8 cm x 0.2 cm. Wound bed is red, moist, clean. Small serosanguineous drainage periwound with callus to the left plantar foot but otherwise clear, dry and intact. No signs of infection.     01/09/2023  The wound to the plantar aspect of the left mid-foot has a circular wound that measures approximately 1 cm in diameter with granular wound base. No signs of infection or further concerns.     01/25/2023  Wound is stable and granular in appearance. Wound measures less than 1 cm in diameter. No signs of infection.     02/08/2023: Wound is relatively unchanged and measures approximately 1 cm in diameter with granular wound base. Wound extends to subcutaneous tissue with no deeper tunneling or tracking.     03/02/2022: Wound remains present and measures approximately 1 cm in diameter with granular wound base. Mild malodor.     04/13/2023:Wound to the plantar aspect of the foot is relatively unchanged and appears granular. New blister and ulceration involving the plantar lateral aspect of the foot measuring approximately 2.0 x 1.5 cm. Mild fibrinous debris and purulent appearing tissue that extends to subcutaneous tissue. No jerzy malodor. No significant fluctuance, erythema, or prominent edema involving the foot. No progressive deformity.     04/26/2023: Wound appears improved to the plantar and lateral aspects of the foot with improving skin island between the two. Granular wound base with mild serous drainage. Mild swelling, but no erythema or calor noted to the foot. No tunneling or tracking concerning purulence.     05/10/2023: Wounds are relatively stable and granular in appearance. Mild serous drainage with swelling involving the left lower extremity. No erythema, purulence, malodor, or surrounding fluctuance. No other open wounds.      05/31/2023  Wound has increased in size mildly and now measures 2.5 cm in diameter. Wound does have mildly fibrotic wound base. Mild periwound erythema. No fluctuance or jerzy purulence. No malodor.     06/20/2023  Wound is granular today with mild serous drainage. No periwound erythema or edema. No gross signs of local infection. No progressive deformity or instability.     07/05/2023  Continued open wound involving the plantar lateral aspect of the left foot with draining wound base and mild serous drainage. No jerzy purulence, periwound erythema or edema. Wound measures approximately 2.5 cm in diameter. Moderate equinus contracture.     09/12/2023: Wound is significantly improved in size. Wound now measures approximately 1.5 cm in diameter with hypergranular tissue. No deep tunneling or tracking. No rajat wound erythema, edema, or signs of infection     09/28/2023: Wound is now healed to the plantar lateral aspect of the left foot. No other complicating features on exam today.     10/26/2023  No open wounds or signs of infection involving the left foot. Minimal callus formation. No other complicating features at this time.     01/25/2024  No open wounds or signs of infection to either foot. No gross deformity and no progressive deformity or instability.     7/25/24: No open wounds or signs of infection.  No preulcerative callus formation involving the plantar lateral aspect of the left foot.  Those are well trimmed.  No progressive deformity or instability involving the feet.  Vascular status remains stable and intact.    1/29/25: No open wounds or signs of infection.  Foot structure is relatively unchanged.  Sensation remains diminished.  Vascular status is stable.      Results  Laboratory Studies  Blood sugar around 100.    Assessment & Plan   Diagnoses and all orders for this visit:    1. Bilateral foot pain (Primary)  -     Cancel: XR Foot 2 View Bilateral; Future  -     Cancel: XR Foot 2 View Bilateral;  Future  -     XR foot 3+ vw bilateral; Future    2. History of transmetatarsal amputation of left foot    3. Foot deformity, bilateral    4. DM type 2 with diabetic peripheral neuropathy    5. Type 2 diabetes mellitus with hyperglycemia, with long-term current use of insulin      Assessment & Plan    Patient returns for routine diabetic foot check.  He continues to do well without any complaints or issues.  Imaging was reviewed showing no substantial or acute changes involving the feet.  Overall, he is doing quite well at this time.  He has been able to perform normal daily activities without any limitation or issues.  He states that his blood sugars are well-controlled.  He has been recently losing weight and trying to increase his activity.  I continue to feel that patient is at moderate diabetic foot risk.Explained importance of diabetic foot care, daily foot checks, and glycemic control. Patient should check both feet on a daily basis, monitor and control blood sugars, make sure that both feet and in between toes are towel dried after baths or showers. Avoid barefoot walking at all times.  I discussed wearing white socks and checking shoes before wearing them. Patient was given information on proper foot care. Call the office at the first signs of a wound or with signs of infection.Reviewed proper basic stretching and manual therapy exercises along with appropriate shoes and activity.  Discussed proper use and/or avoidance of OTC anti-inflammatories.  Patient is to call with any additional issues or concerns.  Greater than 20 minutes was spent before, during, and after evaluation for patient care.    Follow-up  The patient will follow up in 6 months.             Patient or patient representative verbalized consent for the use of Ambient Listening during the visit with  RENETTA Smith DPM for chart documentation. 1/30/2025  06:32 EST    RENETTA Smith DPM

## 2025-02-11 DIAGNOSIS — E11.42 TYPE 2 DIABETES MELLITUS WITH DIABETIC POLYNEUROPATHY, WITH LONG-TERM CURRENT USE OF INSULIN: ICD-10-CM

## 2025-02-11 DIAGNOSIS — Z79.4 TYPE 2 DIABETES MELLITUS WITH DIABETIC POLYNEUROPATHY, WITH LONG-TERM CURRENT USE OF INSULIN: ICD-10-CM

## 2025-02-11 NOTE — TELEPHONE ENCOUNTER
Rf'd.  He had some nausea when we titrated up monthly.  Took him back down to 2.5 mg/week since November.  Will try again to get him back on the 5 mg dose.

## 2025-03-26 ENCOUNTER — OFFICE VISIT (OUTPATIENT)
Dept: FAMILY MEDICINE CLINIC | Facility: CLINIC | Age: 50
End: 2025-03-26
Payer: COMMERCIAL

## 2025-03-26 VITALS
BODY MASS INDEX: 39.88 KG/M2 | RESPIRATION RATE: 18 BRPM | TEMPERATURE: 97.3 F | DIASTOLIC BLOOD PRESSURE: 72 MMHG | WEIGHT: 294.4 LBS | HEIGHT: 72 IN | OXYGEN SATURATION: 95 % | SYSTOLIC BLOOD PRESSURE: 108 MMHG | HEART RATE: 92 BPM

## 2025-03-26 DIAGNOSIS — E11.42 TYPE 2 DIABETES MELLITUS WITH DIABETIC POLYNEUROPATHY, WITH LONG-TERM CURRENT USE OF INSULIN: Primary | ICD-10-CM

## 2025-03-26 DIAGNOSIS — E11.29 MICROALBUMINURIA DUE TO TYPE 2 DIABETES MELLITUS: ICD-10-CM

## 2025-03-26 DIAGNOSIS — E78.2 MIXED HYPERLIPIDEMIA: ICD-10-CM

## 2025-03-26 DIAGNOSIS — I10 WHITE COAT SYNDROME WITH DIAGNOSIS OF HYPERTENSION: ICD-10-CM

## 2025-03-26 DIAGNOSIS — R80.9 MICROALBUMINURIA DUE TO TYPE 2 DIABETES MELLITUS: ICD-10-CM

## 2025-03-26 DIAGNOSIS — I10 BENIGN HYPERTENSION: ICD-10-CM

## 2025-03-26 DIAGNOSIS — Z79.4 TYPE 2 DIABETES MELLITUS WITH DIABETIC POLYNEUROPATHY, WITH LONG-TERM CURRENT USE OF INSULIN: Primary | ICD-10-CM

## 2025-03-26 PROCEDURE — 99214 OFFICE O/P EST MOD 30 MIN: CPT | Performed by: FAMILY MEDICINE

## 2025-03-26 RX ORDER — ONDANSETRON 4 MG/1
4 TABLET, ORALLY DISINTEGRATING ORAL EVERY 8 HOURS PRN
Qty: 24 TABLET | Refills: 2 | Status: SHIPPED | OUTPATIENT
Start: 2025-03-26

## 2025-03-26 NOTE — PROGRESS NOTES
Answers submitted by the patient for this visit:  Diabetes Questionnaire (Submitted on 3/26/2025)  Chief Complaint: Diabetes problem  MedicAlert ID: No  Disease duration: 10 Years  Treatment compliance: most of the time  Symptom course: stable  Home blood tests: 1-2 x per week  High score: 140-180  Below 70: never  blurred vision: No  foot paresthesias: Yes  foot ulcerations: No  polydipsia: No  polyuria: No  weight loss: No  blackouts: No  hospitalization: No  nocturnal hypoglycemia: No  required assistance: No  required glucagon: No  confusion: No  headaches: No  speech difficulty: No  sweats: No  tremors: Yes  Dose schedule: pre-breakfast, at bedtime  Given by: patient  Injection sites: abdominal wall  Current diet: high fat/cholesterol, high salt  Meal planning: none  Exercise: daily  Eye exam current: No  Sees podiatrist: Yes  Subjective   Sánchez Spaulding is a 49 y.o. male.   Chief Complaint   Patient presents with    Diabetes    Hypertension    Hyperlipidemia       History of Present Illness   49 y.o. male with past medical history of type 2 diabetes with polyneuropathy, history of osteomyelitis and amputation, diabetes also complicated by microalbuminuria and retinopathy.    He also has HLD and HTN.  Last visit was 6 months ago.  Not sure what happened with our 3-month follow-up.  Continues to follow with Dr. Smith regarding his foot problems.  Last lab work to monitor diabetes was 6 months ago.  A1c was 9.1 at that time.    Continues on Janumet, Lantus, Starlix, Farxiga, tirzepatide at 5 mg/week.    Also on amlodipine for his blood pressure, lisinopril for blood pressure and renal protection and atorvastatin for lipid management.  Last lipid panel was 9 months ago.    Preventive care-last PSA was 9 months ago and it was excellent at 0.3.    History of Present Illness  The patient presents for evaluation of diabetes.    He reports a significant improvement in his blood glucose levels, with the highest  recorded fasting level being 122 since his last visit. The majority of his readings have been within the range of 90 to 100. He is currently on a regimen of Mounjaro, administered once weekly. An attempt to increase the dosage to 7.5 resulted in adverse effects, prompting a return to the initial dosage. He expresses interest in retrying the 7.5 dosage during his upcoming vacation week, as he will be at home and can monitor any potential side effects. He does not have any antiemetic medication at home. He has 3 pens of 5 mg and 3 pens of 7.5 mg of Mounjaro.    He has experienced a weight loss of approximately 17 pounds, which he attributes to an overall improvement in his health status.    MEDICATIONS  Mounjaro      Patient Active Problem List    Diagnosis Date Noted    Chronic ulcer of left foot with fat layer exposed 07/12/2023     Note Last Updated: 7/12/2023     Added automatically from request for surgery [id number]      Acute osteomyelitis of left foot 07/12/2023     Note Last Updated: 7/12/2023     Added automatically from request for surgery [id number]      Acquired posterior equinus, left 07/12/2023     Note Last Updated: 7/12/2023     Added automatically from request for surgery [id number]      Morbid obesity 05/31/2022    Microalbuminuria due to type 2 diabetes mellitus 01/23/2022    White coat syndrome with diagnosis of hypertension 01/11/2022    Charcot foot due to diabetes mellitus 01/11/2022     Note Last Updated: 1/11/2022     right      Type 2 diabetes mellitus with diabetic polyneuropathy, with long-term current use of insulin      Note Last Updated: 1/26/2022     DX in mid - 30's      Severe nonproliferative diabetic retinopathy associated with type 2 diabetes mellitus 02/15/2021    Benign hypertension 11/15/2016    Diabetic polyneuropathy 06/01/2016    Hyperlipidemia 04/27/2016           Past Surgical History:   Procedure Laterality Date    ACHILLES TENDON SURGERY Left 07/31/2023    Procedure: ACHILLES  TENDON LENGTHENING;  Surgeon: RENETTA Smith DPM;  Location: [facility] MAIN OR;  Service: Podiatry;  Laterality: Left;    EYE SURGERY  2021    FOOT SURGERY  2014    2016  foot surgery X5 2017    LUNG BIOPSY  1996    METATARSAL OSTEOTOMY Left 07/31/2023    Procedure: METATARSECTOMY;  Surgeon: RENETTA Smith DPM;  Location: [facility] MAIN OR;  Service: Podiatry;  Laterality: Left;    TENOTOMY ACHILLES TENDON  2023    Lengthening    TOENAIL EXCISION  2014    TRANS METATARSAL AMPUTATION Left 12/28/2018     Current Outpatient Medications on File Prior to Visit   Medication Sig    amLODIPine (NORVASC) 10 MG tablet Take 1 tablet by mouth Daily.    atorvastatin (LIPITOR) 40 MG tablet Take 1 tablet by mouth Daily.    dapagliflozin Propanediol (Farxiga) 10 MG tablet Take 10 mg by mouth Daily.    Janumet  MG per tablet Take 1 tablet by mouth 2 (Two) Times a Day.    Lantus SoloStar 100 UNIT/ML injection pen Inject 70 Units under the skin into the appropriate area as directed Every Evening.    lisinopril (PRINIVIL,ZESTRIL) 30 MG tablet Take 1 tablet by mouth Daily.    nateglinide (STARLIX) 120 MG tablet Take 1 tablet by mouth 3 (Three) Times a Day Before Meals.    ReliOn Pen Needles 32G X 4 MM misc Use to inject insulin once per day    Tirzepatide 5 MG/0.5ML solution auto-injector Inject 5 mg under the skin into the appropriate area as directed Every 7 (Seven) Days.     No current facility-administered medications on file prior to visit.     Allergies   Allergen Reactions    Trulicity [Dulaglutide] GI Intolerance    Gluconic Acid Rash     Social History     Socioeconomic History    Marital status:      Spouse name: Kenny    Number of children: 0   Tobacco Use    Smoking status: Never     Passive exposure: Never    Smokeless tobacco: Never   Vaping Use    Vaping status: Never Used   Substance and Sexual Activity    Alcohol use: Yes     Alcohol/week: 2.0 standard drinks of alcohol     Comment: Maybe 2 every two  "months    Drug use: No    Sexual activity: Yes     Partners: Female     Birth control/protection: None     Family History   Problem Relation Age of Onset    Kidney nephrosis Father     Cancer Mother     Cancer Maternal Grandfather     Diabetes Maternal Aunt     Diabetes Maternal Aunt        Review of Systems    Objective   /72 (BP Location: Right arm, Patient Position: Sitting, Cuff Size: Large Adult)   Pulse 92   Temp 97.3 °F (36.3 °C) (Temporal)   Resp 18   Ht 182.9 cm (72\")   Wt 134 kg (294 lb 6.4 oz)   SpO2 95%   BMI 39.93 kg/m²   Physical Exam  Constitutional:       Appearance: He is well-developed and overweight. He is not toxic-appearing.   HENT:      Head: Normocephalic and atraumatic.   Eyes:      Conjunctiva/sclera: Conjunctivae normal.   Cardiovascular:      Rate and Rhythm: Normal rate.   Pulmonary:      Effort: Pulmonary effort is normal. No respiratory distress.   Musculoskeletal:         General: Normal range of motion.      Cervical back: Normal range of motion.      Right lower leg: No edema.      Left lower leg: No edema.      Comments: He has protective diabetic foot wear on.   Skin:     General: Skin is warm and dry.      Findings: No rash.   Neurological:      Mental Status: He is alert and oriented to person, place, and time.      Gait: Gait normal.   Psychiatric:         Mood and Affect: Mood normal.         Behavior: Behavior normal.       Physical Exam  Vital Signs  Blood pressure is 108/72. BMI is 39.9.      No visits with results within 4 Month(s) from this visit.   Latest known visit with results is:   Office Visit on 09/11/2024   Component Date Value Ref Range Status    Hemoglobin A1C 09/11/2024 9.1 (H)  4.8 - 5.6 % Final    Comment:          Prediabetes: 5.7 - 6.4           Diabetes: >6.4           Glycemic control for adults with diabetes: <7.0      WBC 09/11/2024 9.3  3.4 - 10.8 x10E3/uL Final    RBC 09/11/2024 4.88  4.14 - 5.80 x10E6/uL Final    Hemoglobin 09/11/2024 " 14.6  13.0 - 17.7 g/dL Final    Hematocrit 09/11/2024 45.7  37.5 - 51.0 % Final    MCV 09/11/2024 94  79 - 97 fL Final    MCH 09/11/2024 29.9  26.6 - 33.0 pg Final    MCHC 09/11/2024 31.9  31.5 - 35.7 g/dL Final    RDW 09/11/2024 13.0  11.6 - 15.4 % Final    Platelets 09/11/2024 254  150 - 450 x10E3/uL Final    Neutrophil Rel % 09/11/2024 62  Not Estab. % Final    Lymphocyte Rel % 09/11/2024 25  Not Estab. % Final    Monocyte Rel % 09/11/2024 6  Not Estab. % Final    Eosinophil Rel % 09/11/2024 5  Not Estab. % Final    Basophil Rel % 09/11/2024 1  Not Estab. % Final    Neutrophils Absolute 09/11/2024 5.8  1.4 - 7.0 x10E3/uL Final    Lymphocytes Absolute 09/11/2024 2.3  0.7 - 3.1 x10E3/uL Final    Monocytes Absolute 09/11/2024 0.6  0.1 - 0.9 x10E3/uL Final    Eosinophils Absolute 09/11/2024 0.5 (H)  0.0 - 0.4 x10E3/uL Final    Basophils Absolute 09/11/2024 0.1  0.0 - 0.2 x10E3/uL Final    Immature Granulocyte Rel % 09/11/2024 1  Not Estab. % Final    Immature Grans Absolute 09/11/2024 0.1  0.0 - 0.1 x10E3/uL Final    Glucose 09/11/2024 161 (H)  70 - 99 mg/dL Final    BUN 09/11/2024 16  6 - 24 mg/dL Final    Creatinine 09/11/2024 1.04  0.76 - 1.27 mg/dL Final    EGFR Result 09/11/2024 87  >59 mL/min/1.73 Final    BUN/Creatinine Ratio 09/11/2024 15  9 - 20 Final    Sodium 09/11/2024 138  134 - 144 mmol/L Final    Potassium 09/11/2024 5.3 (H)  3.5 - 5.2 mmol/L Final    Chloride 09/11/2024 99  96 - 106 mmol/L Final    Total CO2 09/11/2024 24  20 - 29 mmol/L Final    Calcium 09/11/2024 9.8  8.7 - 10.2 mg/dL Final    Total Protein 09/11/2024 7.1  6.0 - 8.5 g/dL Final    Albumin 09/11/2024 4.3  3.8 - 4.9 g/dL Final    Globulin 09/11/2024 2.8  1.5 - 4.5 g/dL Final    Total Bilirubin 09/11/2024 0.3  0.0 - 1.2 mg/dL Final    Alkaline Phosphatase 09/11/2024 88  44 - 121 IU/L Final    AST (SGOT) 09/11/2024 15  0 - 40 IU/L Final    ALT (SGPT) 09/11/2024 17  0 - 44 IU/L Final    Magnesium 09/11/2024 1.9  1.6 - 2.3 mg/dL Final      Results  Laboratory Studies  Fasting blood sugar highest was 122, most between 90 and 100. A1c was 9.1 about 6 months ago.         Assessment & Plan   Diagnoses and all orders for this visit:    1. Type 2 diabetes mellitus with diabetic polyneuropathy, with long-term current use of insulin (Primary)  -     Microalbumin / Creatinine Urine Ratio - Urine, Clean Catch  -     Hemoglobin A1c  -     Comprehensive Metabolic Panel    2. Microalbuminuria due to type 2 diabetes mellitus    3. Benign hypertension  -     CBC & Differential    4. White coat syndrome with diagnosis of hypertension    5. Mixed hyperlipidemia    Other orders  -     ondansetron ODT (ZOFRAN-ODT) 4 MG disintegrating tablet; Place 1 tablet on the tongue Every 8 (Eight) Hours As Needed for Nausea or Vomiting.  Dispense: 24 tablet; Refill: 2      Assessment & Plan  1. Diabetes Mellitus.  His blood glucose levels have been well-controlled, with fasting levels between 90 and 100, and a maximum of 122. His A1c was last checked 6 months ago and was 9.1. He is currently on Mounjaro 5 mg weekly. He experienced nausea when previously increasing the dose to 7.5 mg but plans to try the 7.5 mg dose again next week during his vacation. He is advised to take Zofran if he experiences nausea. If he tolerates the 7.5 mg dose well, he should continue with it and inform the office. If he experiences any issues, he should notify the office immediately. Blood work will be conducted today to monitor his A1c levels. A prescription for Zofran, an antiemetic, will be provided to manage potential nausea associated with the increased dosage of Mounjaro.    2. Weight management.  He has lost 17 pounds over the past 2 months, averaging a weight loss of approximately 8 pounds per month. His current BMI is 39.9, which is below the threshold of 40. To achieve a normal BMI, his weight would need to be reduced to around 180 pounds. The potential benefits of Mounjaro in weight loss  were discussed, including its ability to help lose up to 22% of body weight when used at a dose of 15 mg per week for a year. He is advised to continue with the Mounjaro 7.5 mg dose if tolerated, as it may aid in further weight loss.        Call with any problems or concerns before next visit       Return in about 3 months (around 6/26/2025).  Patient or patient representative verbalized consent for the use of Ambient Listening during the visit with  Pricilla Larose MD for chart documentation. 3/26/2025  11:14 EDT    Part of this note may be an electronic transcription/translation of spoken language to printed text using the Dragon Dictation System    Pricilla Larose [id number] EDT  This note has been electronically signed

## 2025-03-27 LAB
ALBUMIN SERPL-MCNC: 4.4 G/DL (ref 3.8–4.9)
ALBUMIN/CREAT UR: 12 MG/G CREAT (ref 0–29)
ALP SERPL-CCNC: 89 IU/L (ref 44–121)
ALT SERPL-CCNC: 14 IU/L (ref 0–44)
AST SERPL-CCNC: 17 IU/L (ref 0–40)
BASOPHILS # BLD AUTO: 0.1 X10E3/UL (ref 0–0.2)
BASOPHILS NFR BLD AUTO: 1 %
BILIRUB SERPL-MCNC: 0.4 MG/DL (ref 0–1.2)
BUN SERPL-MCNC: 24 MG/DL (ref 6–24)
BUN/CREAT SERPL: 24 (ref 9–20)
CALCIUM SERPL-MCNC: 9.8 MG/DL (ref 8.7–10.2)
CHLORIDE SERPL-SCNC: 99 MMOL/L (ref 96–106)
CO2 SERPL-SCNC: 22 MMOL/L (ref 20–29)
CREAT SERPL-MCNC: 1.02 MG/DL (ref 0.76–1.27)
CREAT UR-MCNC: 95.8 MG/DL
EGFRCR SERPLBLD CKD-EPI 2021: 89 ML/MIN/1.73
EOSINOPHIL # BLD AUTO: 0.4 X10E3/UL (ref 0–0.4)
EOSINOPHIL NFR BLD AUTO: 3 %
ERYTHROCYTE [DISTWIDTH] IN BLOOD BY AUTOMATED COUNT: 12.6 % (ref 11.6–15.4)
GLOBULIN SER CALC-MCNC: 2.8 G/DL (ref 1.5–4.5)
GLUCOSE SERPL-MCNC: 85 MG/DL (ref 70–99)
HBA1C MFR BLD: 6.8 % (ref 4.8–5.6)
HCT VFR BLD AUTO: 42.6 % (ref 37.5–51)
HGB BLD-MCNC: 14 G/DL (ref 13–17.7)
IMM GRANULOCYTES # BLD AUTO: 0.1 X10E3/UL (ref 0–0.1)
IMM GRANULOCYTES NFR BLD AUTO: 1 %
LYMPHOCYTES # BLD AUTO: 2.6 X10E3/UL (ref 0.7–3.1)
LYMPHOCYTES NFR BLD AUTO: 24 %
MCH RBC QN AUTO: 30.5 PG (ref 26.6–33)
MCHC RBC AUTO-ENTMCNC: 32.9 G/DL (ref 31.5–35.7)
MCV RBC AUTO: 93 FL (ref 79–97)
MICROALBUMIN UR-MCNC: 11.8 UG/ML
MONOCYTES # BLD AUTO: 0.7 X10E3/UL (ref 0.1–0.9)
MONOCYTES NFR BLD AUTO: 6 %
NEUTROPHILS # BLD AUTO: 7.1 X10E3/UL (ref 1.4–7)
NEUTROPHILS NFR BLD AUTO: 65 %
PLATELET # BLD AUTO: 237 X10E3/UL (ref 150–450)
POTASSIUM SERPL-SCNC: 4.7 MMOL/L (ref 3.5–5.2)
PROT SERPL-MCNC: 7.2 G/DL (ref 6–8.5)
RBC # BLD AUTO: 4.59 X10E6/UL (ref 4.14–5.8)
SODIUM SERPL-SCNC: 134 MMOL/L (ref 134–144)
WBC # BLD AUTO: 10.8 X10E3/UL (ref 3.4–10.8)

## 2025-05-05 DIAGNOSIS — L98.9 SCALP LESION: Primary | ICD-10-CM

## 2025-05-07 DIAGNOSIS — Z79.4 TYPE 2 DIABETES MELLITUS WITH DIABETIC POLYNEUROPATHY, WITH LONG-TERM CURRENT USE OF INSULIN: ICD-10-CM

## 2025-05-07 DIAGNOSIS — E11.42 TYPE 2 DIABETES MELLITUS WITH DIABETIC POLYNEUROPATHY, WITH LONG-TERM CURRENT USE OF INSULIN: ICD-10-CM

## 2025-05-07 RX ORDER — INSULIN GLARGINE 100 [IU]/ML
70 INJECTION, SOLUTION SUBCUTANEOUS EVERY EVENING
Qty: 30 ML | Refills: 5 | Status: SHIPPED | OUTPATIENT
Start: 2025-05-07

## 2025-05-15 DIAGNOSIS — Z79.4 TYPE 2 DIABETES MELLITUS WITH DIABETIC POLYNEUROPATHY, WITH LONG-TERM CURRENT USE OF INSULIN: Primary | ICD-10-CM

## 2025-05-15 DIAGNOSIS — E11.42 TYPE 2 DIABETES MELLITUS WITH DIABETIC POLYNEUROPATHY, WITH LONG-TERM CURRENT USE OF INSULIN: Primary | ICD-10-CM

## 2025-06-06 DIAGNOSIS — Z79.4 TYPE 2 DIABETES MELLITUS WITH DIABETIC POLYNEUROPATHY, WITH LONG-TERM CURRENT USE OF INSULIN: ICD-10-CM

## 2025-06-06 DIAGNOSIS — E11.42 TYPE 2 DIABETES MELLITUS WITH DIABETIC POLYNEUROPATHY, WITH LONG-TERM CURRENT USE OF INSULIN: ICD-10-CM

## 2025-06-06 RX ORDER — SITAGLIPTIN AND METFORMIN HYDROCHLORIDE 1000; 50 MG/1; MG/1
1 TABLET, FILM COATED ORAL 2 TIMES DAILY
Qty: 180 TABLET | Refills: 3 | Status: SHIPPED | OUTPATIENT
Start: 2025-06-06

## 2025-06-20 DIAGNOSIS — Z79.4 TYPE 2 DIABETES MELLITUS WITH DIABETIC POLYNEUROPATHY, WITH LONG-TERM CURRENT USE OF INSULIN: Primary | ICD-10-CM

## 2025-06-20 DIAGNOSIS — E11.42 TYPE 2 DIABETES MELLITUS WITH DIABETIC POLYNEUROPATHY, WITH LONG-TERM CURRENT USE OF INSULIN: Primary | ICD-10-CM

## 2025-07-02 ENCOUNTER — OFFICE VISIT (OUTPATIENT)
Dept: FAMILY MEDICINE CLINIC | Facility: CLINIC | Age: 50
End: 2025-07-02
Payer: COMMERCIAL

## 2025-07-02 VITALS
HEART RATE: 100 BPM | HEIGHT: 72 IN | SYSTOLIC BLOOD PRESSURE: 124 MMHG | RESPIRATION RATE: 18 BRPM | OXYGEN SATURATION: 94 % | DIASTOLIC BLOOD PRESSURE: 82 MMHG | TEMPERATURE: 98 F | BODY MASS INDEX: 38.68 KG/M2 | WEIGHT: 285.6 LBS

## 2025-07-02 DIAGNOSIS — Z12.5 ENCOUNTER FOR PROSTATE CANCER SCREENING: ICD-10-CM

## 2025-07-02 DIAGNOSIS — E11.42 TYPE 2 DIABETES MELLITUS WITH DIABETIC POLYNEUROPATHY, WITH LONG-TERM CURRENT USE OF INSULIN: Primary | ICD-10-CM

## 2025-07-02 DIAGNOSIS — E78.2 MIXED HYPERLIPIDEMIA: ICD-10-CM

## 2025-07-02 DIAGNOSIS — Z79.4 TYPE 2 DIABETES MELLITUS WITH DIABETIC POLYNEUROPATHY, WITH LONG-TERM CURRENT USE OF INSULIN: Primary | ICD-10-CM

## 2025-07-02 DIAGNOSIS — I10 BENIGN HYPERTENSION: ICD-10-CM

## 2025-07-02 DIAGNOSIS — E11.3493 SEVERE NONPROLIFERATIVE DIABETIC RETINOPATHY OF BOTH EYES ASSOCIATED WITH TYPE 2 DIABETES MELLITUS, MACULAR EDEMA PRESENCE UNSPECIFIED: ICD-10-CM

## 2025-07-02 PROCEDURE — 99214 OFFICE O/P EST MOD 30 MIN: CPT | Performed by: FAMILY MEDICINE

## 2025-07-03 LAB
ALBUMIN SERPL-MCNC: 4.6 G/DL (ref 3.8–4.9)
ALP SERPL-CCNC: 82 IU/L (ref 44–121)
ALT SERPL-CCNC: 14 IU/L (ref 0–44)
AST SERPL-CCNC: 17 IU/L (ref 0–40)
BASOPHILS # BLD AUTO: 0.1 X10E3/UL (ref 0–0.2)
BASOPHILS NFR BLD AUTO: 1 %
BILIRUB SERPL-MCNC: 0.5 MG/DL (ref 0–1.2)
BUN SERPL-MCNC: 17 MG/DL (ref 6–24)
BUN/CREAT SERPL: 18 (ref 9–20)
CALCIUM SERPL-MCNC: 9.8 MG/DL (ref 8.7–10.2)
CHLORIDE SERPL-SCNC: 98 MMOL/L (ref 96–106)
CHOLEST SERPL-MCNC: 175 MG/DL (ref 100–199)
CO2 SERPL-SCNC: 22 MMOL/L (ref 20–29)
CREAT SERPL-MCNC: 0.96 MG/DL (ref 0.76–1.27)
EGFRCR SERPLBLD CKD-EPI 2021: 96 ML/MIN/1.73
EOSINOPHIL # BLD AUTO: 0.4 X10E3/UL (ref 0–0.4)
EOSINOPHIL NFR BLD AUTO: 4 %
ERYTHROCYTE [DISTWIDTH] IN BLOOD BY AUTOMATED COUNT: 13.1 % (ref 11.6–15.4)
GLOBULIN SER CALC-MCNC: 2.7 G/DL (ref 1.5–4.5)
GLUCOSE SERPL-MCNC: 121 MG/DL (ref 70–99)
HBA1C MFR BLD: 6.4 % (ref 4.8–5.6)
HCT VFR BLD AUTO: 44.3 % (ref 37.5–51)
HDLC SERPL-MCNC: 36 MG/DL
HGB BLD-MCNC: 14.4 G/DL (ref 13–17.7)
IMM GRANULOCYTES # BLD AUTO: 0.1 X10E3/UL (ref 0–0.1)
IMM GRANULOCYTES NFR BLD AUTO: 1 %
LDLC SERPL CALC-MCNC: 116 MG/DL (ref 0–99)
LYMPHOCYTES # BLD AUTO: 2.3 X10E3/UL (ref 0.7–3.1)
LYMPHOCYTES NFR BLD AUTO: 24 %
MCH RBC QN AUTO: 30.9 PG (ref 26.6–33)
MCHC RBC AUTO-ENTMCNC: 32.5 G/DL (ref 31.5–35.7)
MCV RBC AUTO: 95 FL (ref 79–97)
MONOCYTES # BLD AUTO: 0.6 X10E3/UL (ref 0.1–0.9)
MONOCYTES NFR BLD AUTO: 6 %
NEUTROPHILS # BLD AUTO: 6 X10E3/UL (ref 1.4–7)
NEUTROPHILS NFR BLD AUTO: 64 %
PLATELET # BLD AUTO: 268 X10E3/UL (ref 150–450)
POTASSIUM SERPL-SCNC: 4.8 MMOL/L (ref 3.5–5.2)
PROT SERPL-MCNC: 7.3 G/DL (ref 6–8.5)
PSA SERPL-MCNC: 0.4 NG/ML (ref 0–4)
RBC # BLD AUTO: 4.66 X10E6/UL (ref 4.14–5.8)
SODIUM SERPL-SCNC: 134 MMOL/L (ref 134–144)
TRIGL SERPL-MCNC: 125 MG/DL (ref 0–149)
VLDLC SERPL CALC-MCNC: 23 MG/DL (ref 5–40)
WBC # BLD AUTO: 9.4 X10E3/UL (ref 3.4–10.8)

## 2025-07-10 DIAGNOSIS — I10 BENIGN HYPERTENSION: ICD-10-CM

## 2025-07-10 RX ORDER — AMLODIPINE BESYLATE 10 MG/1
10 TABLET ORAL DAILY
Qty: 90 TABLET | Refills: 3 | Status: SHIPPED | OUTPATIENT
Start: 2025-07-10

## 2025-07-29 ENCOUNTER — OFFICE VISIT (OUTPATIENT)
Age: 50
End: 2025-07-29
Payer: COMMERCIAL

## 2025-07-29 VITALS — HEIGHT: 72 IN | WEIGHT: 285 LBS | OXYGEN SATURATION: 96 % | HEART RATE: 99 BPM | BODY MASS INDEX: 38.6 KG/M2

## 2025-07-29 DIAGNOSIS — M21.962 FOOT DEFORMITY, BILATERAL: Primary | ICD-10-CM

## 2025-07-29 DIAGNOSIS — M14.671 CHARCOT JOINT OF RIGHT FOOT: ICD-10-CM

## 2025-07-29 DIAGNOSIS — E11.42 DM TYPE 2 WITH DIABETIC PERIPHERAL NEUROPATHY: ICD-10-CM

## 2025-07-29 DIAGNOSIS — Z89.432 HISTORY OF TRANSMETATARSAL AMPUTATION OF LEFT FOOT: ICD-10-CM

## 2025-07-29 DIAGNOSIS — Z79.4 TYPE 2 DIABETES MELLITUS WITH HYPERGLYCEMIA, WITH LONG-TERM CURRENT USE OF INSULIN: ICD-10-CM

## 2025-07-29 DIAGNOSIS — E11.65 TYPE 2 DIABETES MELLITUS WITH HYPERGLYCEMIA, WITH LONG-TERM CURRENT USE OF INSULIN: ICD-10-CM

## 2025-07-29 DIAGNOSIS — M21.961 FOOT DEFORMITY, BILATERAL: Primary | ICD-10-CM

## 2025-07-29 PROCEDURE — 99213 OFFICE O/P EST LOW 20 MIN: CPT | Performed by: PODIATRIST

## 2025-07-29 NOTE — PROGRESS NOTES
07/29/2025  Foot and Ankle Surgery - Established Patient/Follow-up  Provider: Dr. Roc Smith DPM  Location: [facility]    Subjective:  Sánchez Spaulding is a 49 y.o. male.     Chief Complaint   Patient presents with    Left Foot - Follow-up, Pain    Follow-up     PCP: Pricilla Larose MD  Last PCP Visit:   7/2/25       History of Present Illness  The patient presents for callus.    He has been using the same pair of shoes for approximately a year and is currently on his last set of inserts. He obtained his previous set from [facility] with minimal out-of-pocket expenses. He has not undergone any repeat x-rays during this visit.    His blood sugar levels have been well-managed, with an A1c reading of 6.4.      Allergies   Allergen Reactions    Trulicity [Dulaglutide] GI Intolerance    Gluconic Acid Rash       Current Outpatient Medications on File Prior to Visit   Medication Sig Dispense Refill    amLODIPine (NORVASC) 10 MG tablet Take 1 tablet by mouth Daily. 90 tablet 3    atorvastatin (LIPITOR) 40 MG tablet Take 1 tablet by mouth Daily. 90 tablet 3    dapagliflozin Propanediol (Farxiga) 10 MG tablet Take 10 mg by mouth Daily. 90 tablet 3    Janumet  MG per tablet Take 1 tablet by mouth twice daily 180 tablet 3    Lantus SoloStar 100 UNIT/ML injection pen Inject 70 Units under the skin into the appropriate area as directed Every Evening. 30 mL 5    lisinopril (PRINIVIL,ZESTRIL) 30 MG tablet Take 1 tablet by mouth Daily. 90 tablet 3    nateglinide (STARLIX) 120 MG tablet Take 1 tablet by mouth 3 (Three) Times a Day Before Meals. 270 tablet 3    ondansetron ODT (ZOFRAN-ODT) 4 MG disintegrating tablet Place 1 tablet on the tongue Every 8 (Eight) Hours As Needed for Nausea or Vomiting. 24 tablet 2    ReliOn Pen Needles 32G X 4 MM misc Use to inject insulin once per day 100 each 3    Tirzepatide 5 MG/0.5ML solution auto-injector Inject 5 mg under the skin into the appropriate area as directed 1  "(One) Time Per Week. 2 mL 1     No current facility-administered medications on file prior to visit.       Objective   Pulse 99   Ht 182.9 cm (72\")   Wt 129 kg (285 lb)   SpO2 96%   BMI 38.65 kg/m²     Foot/Ankle Exam  Physical Exam  GENERAL  Orientation:  AAOx3  Affect:  appropriate     VASCULAR      Right Foot Vascularity   Normal vascular exam    Dorsalis pedis:  2+  Posterior tibial:  2+  Skin temperature:  warm  Edema grading:  None  CFT:  < 3 seconds  Pedal hair growth:  Present  Varicosities:  none      Left Foot Vascularity   Normal vascular exam    Dorsalis pedis:  2+  Posterior tibial:  2+  Skin temperature:  warm  Edema grading:  None  CFT:  < 3 seconds  Pedal hair growth:  Present  Varicosities:  none     NEUROLOGIC      Right Foot Neurologic   Light touch sensation: normal  Hot/Cold sensation: normal  Achilles reflex:  2+      Left Foot Neurologic   Light touch sensation: normal  Hot/Cold sensation:  normal  Achilles reflex:  2+     MUSCULOSKELETAL      Right Foot Musculoskeletal   Arch:  Normal      Left Foot Musculoskeletal   Arch:  Normal     MUSCLE STRENGTH      Right Foot Muscle Strength   Normal strength    Foot dorsiflexion:  5  Foot plantar flexion:  5  Foot inversion:  5  Foot eversion:  5      Left Foot Muscle Strength   Normal strength    Foot dorsiflexion:  5  Foot plantar flexion:  5  Foot inversion:  5  Foot eversion:  5     DERMATOLOGIC       Right Foot Dermatologic   Skin  Right foot skin is intact.   Nails comment:  Nails 1-5      Left Foot Dermatologic   Skin  Left foot skin is intact.   Nails comment:  Nails 1-5     TESTS      Right Foot Tests   Anterior drawer: negative  Varus tilt: negative      Left Foot Tests   Anterior drawer: negative  Varus tilt: negative     Right foot additional comments: 01/25/2024  No open wounds or signs of infection to either foot. No gross deformity and no progressive deformity or instability.     Left foot additional comments:     Left Foot " Additional Comments:  Diabetic foot ulcer to left plantar foot measures approximately 0.9 cm x 0.8 cm x 0.2 cm. Wound bed is red, moist, clean. Small serosanguineous drainage periwound with callus to the left plantar foot but otherwise clear, dry and intact. No signs of infection.     01/09/2023  The wound to the plantar aspect of the left mid-foot has a circular wound that measures approximately 1 cm in diameter with granular wound base. No signs of infection or further concerns.     01/25/2023  Wound is stable and granular in appearance. Wound measures less than 1 cm in diameter. No signs of infection.     02/08/2023: Wound is relatively unchanged and measures approximately 1 cm in diameter with granular wound base. Wound extends to subcutaneous tissue with no deeper tunneling or tracking.     03/02/2022: Wound remains present and measures approximately 1 cm in diameter with granular wound base. Mild malodor.     04/13/2023:Wound to the plantar aspect of the foot is relatively unchanged and appears granular. New blister and ulceration involving the plantar lateral aspect of the foot measuring approximately 2.0 x 1.5 cm. Mild fibrinous debris and purulent appearing tissue that extends to subcutaneous tissue. No jerzy malodor. No significant fluctuance, erythema, or prominent edema involving the foot. No progressive deformity.     04/26/2023: Wound appears improved to the plantar and lateral aspects of the foot with improving skin island between the two. Granular wound base with mild serous drainage. Mild swelling, but no erythema or calor noted to the foot. No tunneling or tracking concerning purulence.     05/10/2023: Wounds are relatively stable and granular in appearance. Mild serous drainage with swelling involving the left lower extremity. No erythema, purulence, malodor, or surrounding fluctuance. No other open wounds.     05/31/2023  Wound has increased in size mildly and now measures 2.5 cm in diameter.  Wound does have mildly fibrotic wound base. Mild periwound erythema. No fluctuance or jerzy purulence. No malodor.     06/20/2023  Wound is granular today with mild serous drainage. No periwound erythema or edema. No gross signs of local infection. No progressive deformity or instability.     07/05/2023  Continued open wound involving the plantar lateral aspect of the left foot with draining wound base and mild serous drainage. No jerzy purulence, periwound erythema or edema. Wound measures approximately 2.5 cm in diameter. Moderate equinus contracture.     09/12/2023: Wound is significantly improved in size. Wound now measures approximately 1.5 cm in diameter with hypergranular tissue. No deep tunneling or tracking. No rajat wound erythema, edema, or signs of infection     09/28/2023: Wound is now healed to the plantar lateral aspect of the left foot. No other complicating features on exam today.     10/26/2023  No open wounds or signs of infection involving the left foot. Minimal callus formation. No other complicating features at this time.     01/25/2024  No open wounds or signs of infection to either foot. No gross deformity and no progressive deformity or instability.     7/25/24: No open wounds or signs of infection.  No preulcerative callus formation involving the plantar lateral aspect of the left foot.  Those are well trimmed.  No progressive deformity or instability involving the feet.  Vascular status remains stable and intact.     1/29/25: No open wounds or signs of infection.  Foot structure is relatively unchanged.  Sensation remains diminished.  Vascular status is stable.    7/29/25: Physical exam is relatively unchanged.  No open wounds or signs of infection.  Subtle callus formation involving the plantar residual forefoot region on the left lower extremity.  No excessive xerosis.  Neurovascular status is unchanged.      Results  Laboratory Studies  A1c was at 6.4.    Assessment & Plan   Diagnoses  and all orders for this visit:    1. Foot deformity, bilateral (Primary)  -     Miscellaneous DME    2. Charcot joint of right foot  -     Miscellaneous DME    3. DM type 2 with diabetic peripheral neuropathy  -     Miscellaneous DME    4. Type 2 diabetes mellitus with hyperglycemia, with long-term current use of insulin    5. History of transmetatarsal amputation of left foot  -     Miscellaneous DME      Assessment & Plan    He was advised to use a pumice stone for callus management, ensuring not to be overly aggressive. The importance of maintaining skin moisture was emphasized. A prescription for shoes and inserts was provided. His A1c is at 6.4, indicating good blood sugar control. He was encouraged to maintain his current regimen and lifestyle to keep his blood sugar levels stable.  I continue to feel that patient is at relatively mild diabetic foot risk at this time.Explained importance of diabetic foot care, daily foot checks, and glycemic control. Patient should check both feet on a daily basis, monitor and control blood sugars, make sure that both feet and in between toes are towel dried after baths or showers. Avoid barefoot walking at all times.  I discussed wearing white socks and checking shoes before wearing them. Patient was given information on proper foot care. Call the office at the first signs of a wound or with signs of infection.Reviewed proper basic stretching and manual therapy exercises along with appropriate shoes and activity.  Discussed proper use and/or avoidance of OTC anti-inflammatories.  Patient is to call with any additional issues or concerns.  Greater than 20 minutes was spent before, during, and after evaluation for patient care.    The encounter note is created with the use of AI technology.  I do apologize if there are typos and/or confusion within the note.  Please feel free to contact me or my office with any questions or concerns.    Follow-up  The patient will follow up in  6 months.             Patient or patient representative verbalized consent for the use of Ambient Listening during the visit with  RENETTA Smith DPM for chart documentation. 7/29/2025  12:01 EDT    RENETTA Smith DPM

## 2025-08-14 DIAGNOSIS — E11.42 TYPE 2 DIABETES MELLITUS WITH DIABETIC POLYNEUROPATHY, WITH LONG-TERM CURRENT USE OF INSULIN: ICD-10-CM

## 2025-08-14 DIAGNOSIS — Z79.4 TYPE 2 DIABETES MELLITUS WITH DIABETIC POLYNEUROPATHY, WITH LONG-TERM CURRENT USE OF INSULIN: ICD-10-CM

## 2025-08-14 RX ORDER — TIRZEPATIDE 5 MG/.5ML
INJECTION, SOLUTION SUBCUTANEOUS
Qty: 2 ML | Refills: 2 | Status: SHIPPED | OUTPATIENT
Start: 2025-08-14

## (undated) DEVICE — SPLNT SCOTCHCAST QUICKSTEP DBL/SD/FELT FIBRGLS 4X30IN WHT

## (undated) DEVICE — ANTIBACTERIAL UNDYED BRAIDED (POLYGLACTIN 910), SYNTHETIC ABSORBABLE SUTURE: Brand: COATED VICRYL

## (undated) DEVICE — SOLUTION,WATER,IRRIGATION,1000ML,STERILE: Brand: MEDLINE

## (undated) DEVICE — SOL IRRIG NACL 1000ML

## (undated) DEVICE — CVR HNDL LT SURG ACCSSRY BLU STRL

## (undated) DEVICE — PK EXTREM 50

## (undated) DEVICE — INTENDED FOR TISSUE SEPARATION, AND OTHER PROCEDURES THAT REQUIRE A SHARP SURGICAL BLADE TO PUNCTURE OR CUT.: Brand: BARD-PARKER ® CARBON RIB-BACK BLADES

## (undated) DEVICE — SLV SCD CALF HEMOFORCE DVT THERP REPROC MD

## (undated) DEVICE — SUT ETHLN 2/0 PS 18IN 585H

## (undated) DEVICE — SUT ETHLN 3/0 FS1 30IN 669H

## (undated) DEVICE — MICRO SAGITTAL BLADE (9.5 X 0.4 X 25.5MM)

## (undated) DEVICE — BNDG ESMARK 4IN 12FT LF STRL BLU

## (undated) DEVICE — CONTAINER,SPECIMEN,OR STERILE,4OZ: Brand: MEDLINE

## (undated) DEVICE — GOWN,REINFORCE,POLY,SIRUS,BREATH SLV,XLG: Brand: MEDLINE

## (undated) DEVICE — NDL HYPO PRECISIONGLIDE/REG 18G 1IN PNK

## (undated) DEVICE — GLV SURG BIOGEL M LTX PF 7 1/2

## (undated) DEVICE — STAPLER, SKIN, 35W, A: Brand: MEDLINE INDUSTRIES, INC.

## (undated) DEVICE — SHT AIR TRANSFR COMFRT GLIDE LAT 40X80IN

## (undated) DEVICE — UNDERGLV SURG BIOGEL INDICAT PI SZ8 BLU

## (undated) DEVICE — PENCL HND ROCKRSWTCH HOLSTR EZ CLEAN TP CRD 10FT

## (undated) DEVICE — KT SURG TURNOVER 050